# Patient Record
Sex: FEMALE | Race: WHITE | NOT HISPANIC OR LATINO | Employment: OTHER | URBAN - METROPOLITAN AREA
[De-identification: names, ages, dates, MRNs, and addresses within clinical notes are randomized per-mention and may not be internally consistent; named-entity substitution may affect disease eponyms.]

---

## 2018-10-18 ENCOUNTER — HOSPITAL ENCOUNTER (EMERGENCY)
Facility: HOSPITAL | Age: 23
Discharge: HOME | End: 2018-10-18
Attending: SURGERY
Payer: COMMERCIAL

## 2018-10-18 VITALS
RESPIRATION RATE: 18 BRPM | HEART RATE: 78 BPM | TEMPERATURE: 98 F | OXYGEN SATURATION: 98 % | DIASTOLIC BLOOD PRESSURE: 78 MMHG | WEIGHT: 250 LBS | SYSTOLIC BLOOD PRESSURE: 134 MMHG

## 2018-10-18 DIAGNOSIS — F41.9 ANXIETY: Primary | ICD-10-CM

## 2018-10-18 DIAGNOSIS — R05.9 COUGH: ICD-10-CM

## 2018-10-18 DIAGNOSIS — J40 BRONCHITIS: ICD-10-CM

## 2018-10-18 LAB
ALBUMIN SERPL BCP-MCNC: 3.8 G/DL
ALP SERPL-CCNC: 88 U/L
ALT SERPL W/O P-5'-P-CCNC: 14 U/L
AMPHET+METHAMPHET UR QL: NEGATIVE
ANION GAP SERPL CALC-SCNC: 10 MMOL/L
AST SERPL-CCNC: 18 U/L
B-HCG UR QL: NEGATIVE
BARBITURATES UR QL SCN>200 NG/ML: NEGATIVE
BASOPHILS # BLD AUTO: 0.05 K/UL
BASOPHILS NFR BLD: 0.5 %
BENZODIAZ UR QL SCN>200 NG/ML: NEGATIVE
BILIRUB SERPL-MCNC: 0.2 MG/DL
BILIRUB UR QL STRIP: NEGATIVE
BUN SERPL-MCNC: 11 MG/DL
BZE UR QL SCN: NEGATIVE
CALCIUM SERPL-MCNC: 9.2 MG/DL
CANNABINOIDS UR QL SCN: NEGATIVE
CHLORIDE SERPL-SCNC: 107 MMOL/L
CLARITY UR: CLEAR
CO2 SERPL-SCNC: 22 MMOL/L
COLOR UR: YELLOW
CREAT SERPL-MCNC: 0.8 MG/DL
CREAT UR-MCNC: 79.8 MG/DL
DEPRECATED S PYO AG THROAT QL EIA: NEGATIVE
DIFFERENTIAL METHOD: NORMAL
EOSINOPHIL # BLD AUTO: 0.3 K/UL
EOSINOPHIL NFR BLD: 2.5 %
ERYTHROCYTE [DISTWIDTH] IN BLOOD BY AUTOMATED COUNT: 14.1 %
EST. GFR  (AFRICAN AMERICAN): >60 ML/MIN/1.73 M^2
EST. GFR  (NON AFRICAN AMERICAN): >60 ML/MIN/1.73 M^2
GLUCOSE SERPL-MCNC: 100 MG/DL
GLUCOSE UR QL STRIP: NEGATIVE
HCT VFR BLD AUTO: 41.3 %
HETEROPH AB SERPL QL IA: NEGATIVE
HGB BLD-MCNC: 13.8 G/DL
HGB UR QL STRIP: ABNORMAL
INFLUENZA A, MOLECULAR: NEGATIVE
INFLUENZA B, MOLECULAR: NEGATIVE
KETONES UR QL STRIP: NEGATIVE
LEUKOCYTE ESTERASE UR QL STRIP: NEGATIVE
LYMPHOCYTES # BLD AUTO: 2.2 K/UL
LYMPHOCYTES NFR BLD: 21.1 %
MCH RBC QN AUTO: 28.2 PG
MCHC RBC AUTO-ENTMCNC: 33.4 G/DL
MCV RBC AUTO: 84 FL
METHADONE UR QL SCN>300 NG/ML: NEGATIVE
MONOCYTES # BLD AUTO: 0.9 K/UL
MONOCYTES NFR BLD: 8.5 %
NEUTROPHILS # BLD AUTO: 7.2 K/UL
NEUTROPHILS NFR BLD: 67.4 %
NITRITE UR QL STRIP: NEGATIVE
OPIATES UR QL SCN: NEGATIVE
PCP UR QL SCN>25 NG/ML: NEGATIVE
PH UR STRIP: 6 [PH] (ref 5–8)
PLATELET # BLD AUTO: 265 K/UL
PMV BLD AUTO: 11.6 FL
POTASSIUM SERPL-SCNC: 4.3 MMOL/L
PROT SERPL-MCNC: 7.4 G/DL
PROT UR QL STRIP: NEGATIVE
RBC # BLD AUTO: 4.9 M/UL
SODIUM SERPL-SCNC: 139 MMOL/L
SP GR UR STRIP: 1.02 (ref 1–1.03)
SPECIMEN SOURCE: NORMAL
TOXICOLOGY INFORMATION: NORMAL
URN SPEC COLLECT METH UR: ABNORMAL
UROBILINOGEN UR STRIP-ACNC: NEGATIVE EU/DL
WBC # BLD AUTO: 10.62 K/UL

## 2018-10-18 PROCEDURE — 87502 INFLUENZA DNA AMP PROBE: CPT

## 2018-10-18 PROCEDURE — 85025 COMPLETE CBC W/AUTO DIFF WBC: CPT

## 2018-10-18 PROCEDURE — 99900031 HC PATIENT EDUCATION (STAT)

## 2018-10-18 PROCEDURE — 80053 COMPREHEN METABOLIC PANEL: CPT

## 2018-10-18 PROCEDURE — 87880 STREP A ASSAY W/OPTIC: CPT

## 2018-10-18 PROCEDURE — 80307 DRUG TEST PRSMV CHEM ANLYZR: CPT

## 2018-10-18 PROCEDURE — 94640 AIRWAY INHALATION TREATMENT: CPT

## 2018-10-18 PROCEDURE — 87081 CULTURE SCREEN ONLY: CPT

## 2018-10-18 PROCEDURE — 36415 COLL VENOUS BLD VENIPUNCTURE: CPT

## 2018-10-18 PROCEDURE — 93005 ELECTROCARDIOGRAM TRACING: CPT

## 2018-10-18 PROCEDURE — 25000242 PHARM REV CODE 250 ALT 637 W/ HCPCS: Performed by: SURGERY

## 2018-10-18 PROCEDURE — 81003 URINALYSIS AUTO W/O SCOPE: CPT | Mod: 59

## 2018-10-18 PROCEDURE — 99284 EMERGENCY DEPT VISIT MOD MDM: CPT | Mod: 25

## 2018-10-18 PROCEDURE — 96372 THER/PROPH/DIAG INJ SC/IM: CPT

## 2018-10-18 PROCEDURE — 86308 HETEROPHILE ANTIBODY SCREEN: CPT

## 2018-10-18 PROCEDURE — 93010 ELECTROCARDIOGRAM REPORT: CPT | Mod: ,,, | Performed by: INTERNAL MEDICINE

## 2018-10-18 PROCEDURE — 63600175 PHARM REV CODE 636 W HCPCS: Performed by: SURGERY

## 2018-10-18 PROCEDURE — 81025 URINE PREGNANCY TEST: CPT

## 2018-10-18 RX ORDER — IPRATROPIUM BROMIDE AND ALBUTEROL SULFATE 2.5; .5 MG/3ML; MG/3ML
3 SOLUTION RESPIRATORY (INHALATION)
Status: COMPLETED | OUTPATIENT
Start: 2018-10-18 | End: 2018-10-18

## 2018-10-18 RX ORDER — PROMETHAZINE HYDROCHLORIDE AND DEXTROMETHORPHAN HYDROBROMIDE 6.25; 15 MG/5ML; MG/5ML
5 SYRUP ORAL EVERY 6 HOURS PRN
Qty: 118 ML | Refills: 0 | Status: SHIPPED | OUTPATIENT
Start: 2018-10-18 | End: 2018-10-28

## 2018-10-18 RX ORDER — LEVOFLOXACIN 500 MG/1
500 TABLET, FILM COATED ORAL DAILY
Qty: 7 TABLET | Refills: 0 | Status: SHIPPED | OUTPATIENT
Start: 2018-10-18 | End: 2018-10-25

## 2018-10-18 RX ORDER — METHYLPREDNISOLONE SOD SUCC 125 MG
125 VIAL (EA) INJECTION
Status: COMPLETED | OUTPATIENT
Start: 2018-10-18 | End: 2018-10-18

## 2018-10-18 RX ORDER — METHYLPREDNISOLONE 4 MG/1
TABLET ORAL
Qty: 1 PACKAGE | Refills: 0 | Status: SHIPPED | OUTPATIENT
Start: 2018-10-18 | End: 2019-07-03

## 2018-10-18 RX ADMIN — METHYLPREDNISOLONE SODIUM SUCCINATE 125 MG: 125 INJECTION, POWDER, FOR SOLUTION INTRAMUSCULAR; INTRAVENOUS at 05:10

## 2018-10-18 RX ADMIN — IPRATROPIUM BROMIDE AND ALBUTEROL SULFATE 3 ML: .5; 3 SOLUTION RESPIRATORY (INHALATION) at 06:10

## 2018-10-18 NOTE — ED TRIAGE NOTES
Stated she has had chest discomfort for about 4 months. Also states she has had a cough for about 1 week.  No n/v or diarrhea.  No weakness or sob.

## 2018-10-18 NOTE — ED PROVIDER NOTES
[facility]                                                 Chief Complaint  23 y.o. female with Cough (cough x 1 week)    History of Present Illness  Julieth Randhawa presents to the emergency room with cough and congestion  Patient has had a cough for a week, thinks that she has recurrent bronchitis  Patient on exam has clear lung sounds with minor nasal congestion noted  Pt has severe anxiety and states she has not been feeling well for 4 months  Patient has a normal sinus rhythm on EKG without ST changes noted tonight  Patient is a clear chest x-ray, afebrile with a 99% oxygen on ER triage today    The history is provided by the patient   device was not used during this ER visit  History reviewed. No pertinent past medical history.  History reviewed. No pertinent surgical history.   No Known Allergies   History reviewed. No pertinent family history.    Review of Systems and Physical Exam      Review of Systems  -- Constitution - no fever, denies fatigue, no weakness, no chills  -- Eyes - no tearing or redness, no visual disturbance  -- Ear, Nose - no tinnitus or earache, no nasal congestion or discharge  -- Mouth,Throat - no sore throat, no toothache, normal voice, normal swallowing  -- Respiratory - cough and congestion, no shortness of breath, no DENG  -- Cardiovascular - denies chest pain, no palpitations, denies claudication  -- Gastrointestinal - denies abdominal pain, nausea, vomiting, or diarrhea  -- Genitourinary - no dysuria, no hematuria, no flank pain, no bladder pain  -- Musculoskeletal - denies back pain, negative for myalgias and arthralgias   -- Neurological - no headache, denies weakness or seizure; no LOC  -- Skin - denies pallor, rash, or changes in skin. no hives or welts noted    Vital Signs  Her oral temperature is 97.8 °F (36.6 °C).   Her blood pressure is 124/70 and her pulse is 85.   Her respiration is 16 and oxygen saturation is 98%.     Physical  Exam  -- Nursing note and vitals reviewed  -- Constitutional: Appears well-developed and well-nourished  -- Head: Atraumatic. Normocephalic. No obvious abnormality  -- Eyes: Pupils are equal and reactive to light. Normal conjunctiva and lids  -- Nose: nasal mucosa erythema and edema; clear nasal discharge noted   -- Throat: Mucous membranes moist, pharynx normal, normal tonsils. No lesions   -- Ears: External ears and TM normal bilaterally. Normal hearing and no drainage  -- Neck: Normal range of motion. Neck supple. No masses, trachea midline  -- Cardiac: Normal rate, regular rhythm and normal heart sounds  -- Pulmonary: Normal respiratory effort, breath sounds clear to auscultation  -- Abdominal: Soft, no tenderness. Normal bowel sounds. Normal liver edge  -- Musculoskeletal: Normal range of motion, no effusions. Joints stable   -- Neurological: No focal deficits. Showed good interaction with staff  -- Skin: Warm and dry. No evidence of rash or cellulitis    Emergency Room Course      Lab Results     K 4.3      CO2 22 (L)   BUN 11   CREATININE 0.8      ALKPHOS 88   AST 18   ALT 14   BILITOT 0.2   ALBUMIN 3.8   PROT 7.4   WBC 10.62   HGB 13.8   HCT 41.3        Urinalysis  -- Urinalysis performed during this ER visit showed no signs of infection    -- The urine pregnancy test today was negative; patient informed of the results    EKG  -- The EKG findings today were without concerning findings from baseline    Radiology  -- Chest x-ray showed no infiltrate and showed no acute pathology    Medications Given  methylPREDNISolone sodium succinate injection 125 mg (125 mg Intramuscular Given 10/18/18 7787)   albuterol-ipratropium 2.5 mg-0.5 mg/3 mL nebulizer solution 3 mL (3 mLs Nebulization Given 10/18/18 4078)     Additional workup  -- The influenza screen was negative  -- The strep screen was negative  -- The mono screen was negative    Diagnosis  -- Anxiety  -- Bronchitis    Disposition and  Plan  -- Disposition: home  -- Condition: stable  -- Follow-up: Patient to follow up with MDin 1-2 days.  -- I advised the patient that we have found no life threatening condition today  -- At this time, I believe the patient is clinically stable for discharge.   -- The patient acknowledges that close follow up with a MD is required   -- Patient agrees to comply with all instruction and direction given in the ER    This note is dictated on Dragon Natural Speaking word recognition program.  There are word recognition mistakes that are occasionally missed on review.            Shawn Lerner MD  10/18/18 3743

## 2018-10-18 NOTE — ED NOTES
Dr. campos with pt and family. Pt continues to have some abd discomfort.  Dr. campos consulting family member in his office.

## 2018-10-21 LAB — BACTERIA THROAT CULT: NORMAL

## 2019-02-08 ENCOUNTER — TELEPHONE (OUTPATIENT)
Dept: OBSTETRICS AND GYNECOLOGY | Facility: CLINIC | Age: 24
End: 2019-02-08

## 2019-02-08 NOTE — TELEPHONE ENCOUNTER
----- Message from Melissa Rubio sent at 2/8/2019 10:55 AM CST -----  Contact: self  Julieth Randhawa  MRN: [id number]  Home Phone      [phone]  Work Phone      Not on file.  Mobile          Not on file.    Patient Care Team:  Primary Doctor No as PCP - General  OB? No  What phone number can you be reached at? [phone]  Message:  Pt states she would like to been seen by a female physician. Pt states endometriosis runs in her family and she's been having a lot of abdominal pain. Pt was offered soonest available with  but would like something sooner. Pt would be a new pt with [facility]. Please return call.

## 2019-04-01 ENCOUNTER — TELEPHONE (OUTPATIENT)
Dept: OBSTETRICS AND GYNECOLOGY | Facility: CLINIC | Age: 24
End: 2019-04-01

## 2019-04-01 NOTE — TELEPHONE ENCOUNTER
"----- Message from Denise Perez sent at 4/1/2019 10:57 AM CDT -----  Contact: Self  Julieth Randhawa  MRN: [id number]  Home Phone      [phone]  Work Phone      Not on file.  Mobile          Not on file.    Patient Care Team:  Primary Doctor No as PCP - General  OB? No  What phone number can you be reached at? [phone]  Message:   Pt has appt for tomorrow for "painful cycles" and would like to know what will take place at the appt. Please advise.   "

## 2019-04-01 NOTE — TELEPHONE ENCOUNTER
Spoke to pt. Inquiring what procedure will be done at upcoming appt. [facility] will speak to MD regarding issue and may have pelvic exam if deemed necessary. Verbalized understanding.

## 2019-07-03 ENCOUNTER — OFFICE VISIT (OUTPATIENT)
Dept: INTERNAL MEDICINE | Facility: CLINIC | Age: 24
End: 2019-07-03
Payer: COMMERCIAL

## 2019-07-03 VITALS
BODY MASS INDEX: 44.47 KG/M2 | HEIGHT: 63 IN | OXYGEN SATURATION: 98 % | RESPIRATION RATE: 18 BRPM | HEART RATE: 81 BPM | DIASTOLIC BLOOD PRESSURE: 84 MMHG | WEIGHT: 251 LBS | SYSTOLIC BLOOD PRESSURE: 128 MMHG

## 2019-07-03 DIAGNOSIS — E66.01 MORBID OBESITY WITH BMI OF 40.0-44.9, ADULT: ICD-10-CM

## 2019-07-03 DIAGNOSIS — R55 PRE-SYNCOPE: Primary | ICD-10-CM

## 2019-07-03 DIAGNOSIS — R42 DIZZINESS: ICD-10-CM

## 2019-07-03 PROCEDURE — 3008F PR BODY MASS INDEX (BMI) DOCUMENTED: ICD-10-PCS | Mod: CPTII,S$GLB,, | Performed by: INTERNAL MEDICINE

## 2019-07-03 PROCEDURE — 99999 PR PBB SHADOW E&M-EST. PATIENT-LVL III: CPT | Mod: PBBFAC,,, | Performed by: INTERNAL MEDICINE

## 2019-07-03 PROCEDURE — 99203 PR OFFICE/OUTPT VISIT, NEW, LEVL III, 30-44 MIN: ICD-10-PCS | Mod: S$GLB,,, | Performed by: INTERNAL MEDICINE

## 2019-07-03 PROCEDURE — 99203 OFFICE O/P NEW LOW 30 MIN: CPT | Mod: S$GLB,,, | Performed by: INTERNAL MEDICINE

## 2019-07-03 PROCEDURE — 99999 PR PBB SHADOW E&M-EST. PATIENT-LVL III: ICD-10-PCS | Mod: PBBFAC,,, | Performed by: INTERNAL MEDICINE

## 2019-07-03 PROCEDURE — 3008F BODY MASS INDEX DOCD: CPT | Mod: CPTII,S$GLB,, | Performed by: INTERNAL MEDICINE

## 2019-07-03 RX ORDER — MECLIZINE HYDROCHLORIDE 25 MG/1
25 TABLET ORAL 3 TIMES DAILY PRN
Qty: 30 TABLET | Refills: 0 | Status: SHIPPED | OUTPATIENT
Start: 2019-07-03 | End: 2020-05-25

## 2019-07-03 NOTE — PROGRESS NOTES
"Subjective:       Patient ID: Julieth Randhawa is a 24 y.o. female.    Chief Complaint: Dizziness (with lightheaded- x 6/21 - comes and goes but getting worse)      HPI:  Patient is new to clinic and presents to [facility] care and with lightheadedness. Sx started about 2 weeks ago. She fells if she "moves to quickly" she feels her symptoms come one. She thinks it is progressing and now just "coming anytime" Associated with nausea. She describes a spinning sensation and tunnel vision. She reports her sx occurring "20 times a day". Sx last for abo ut 10 seconds and then resolves. Nothing makes it worse. Nothing makes it better. No head trauma. No h/o diabetes, HTN. She does have PCOS and endometriosis. She is not on any prescription meds, herbals or supplements. She reports she is  to a female partner so she is not pregnant.    Tobacco use: denies  EtOH use: denies  Illicit drugs: denies    History reviewed. No pertinent past medical history.    Family History   Problem Relation Age of Onset    Hypertension Mother        Social History     Socioeconomic History    Marital status: Single     Spouse name: Not on file    Number of children: Not on file    Years of education: Not on file    Highest education level: Not on file   Occupational History    Not on file   Social Needs    Financial resource strain: Not on file    Food insecurity:     Worry: Not on file     Inability: Not on file    Transportation needs:     Medical: Not on file     Non-medical: Not on file   Tobacco Use    Smoking status: Never Smoker    Smokeless tobacco: Never Used   Substance and Sexual Activity    Alcohol use: Never     Frequency: Never    Drug use: Never    Sexual activity: Not on file   Lifestyle    Physical activity:     Days per week: Not on file     Minutes per session: Not on file    Stress: Not on file   Relationships    Social connections:     Talks on phone: Not on file     Gets together: Not on file     Attends " [employer] service: Not on file     Active member of club or organization: Not on file     Attends meetings of clubs or organizations: Not on file     Relationship status: Not on file   Other Topics Concern    Not on file   Social History Narrative    Not on file       Review of Systems   Constitutional: Negative for activity change, fatigue, fever and unexpected weight change.   HENT: Negative for congestion, ear pain, hearing loss, rhinorrhea and sore throat.    Eyes: Negative for redness and visual disturbance.   Respiratory: Negative for cough, shortness of breath and wheezing.    Cardiovascular: Negative for chest pain, palpitations and leg swelling.   Gastrointestinal: Negative for abdominal pain, constipation, diarrhea, nausea and vomiting.   Genitourinary: Negative for dysuria, frequency and urgency.   Musculoskeletal: Negative for back pain, joint swelling and neck pain.   Skin: Negative for color change, rash and wound.   Neurological: Positive for dizziness and light-headedness. Negative for tremors and headaches.         Objective:      Physical Exam   Constitutional: She is oriented to person, place, and time. She appears well-developed and well-nourished. No distress.   HENT:   Head: Normocephalic and atraumatic.   Right Ear: External ear normal.   Left Ear: External ear normal.   Eyes: Pupils are equal, round, and reactive to light. Conjunctivae and EOM are normal. Right eye exhibits no discharge. Left eye exhibits no discharge.   Neck: Neck supple. No tracheal deviation present.   Cardiovascular: Normal rate and regular rhythm.   No murmur heard.  Pulmonary/Chest: Effort normal and breath sounds normal. No respiratory distress. She has no wheezes. She has no rales.   Abdominal: Soft. Bowel sounds are normal. She exhibits no distension. There is no tenderness.   Musculoskeletal: She exhibits no edema.   Neurological: She is alert and oriented to person, place, and time. No cranial nerve deficit.    Skin: Skin is warm and dry.   Psychiatric: She has a normal mood and affect. Her behavior is normal.   Vitals reviewed.      Assessment:       1. Pre-syncope    2. Dizziness    3. Morbid obesity with BMI of 40.0-44.9, adult        Plan:       Julieth was seen today for dizziness.    Diagnoses and all orders for this visit:    Pre-syncope  -     CBC auto differential; Future  -     Comprehensive metabolic panel; Future  -     TSH; Future  -     Lipid panel; Future  -     T4, free; Future  -     Urinalysis; Future  -     Drug screen panel, emergency; Future  -     EKG 12-lead; Future    Dizziness  -     CBC auto differential; Future  -     Comprehensive metabolic panel; Future  -     TSH; Future  -     Lipid panel; Future  -     T4, free; Future  -     Urinalysis; Future  -     Drug screen panel, emergency; Future  -     EKG 12-lead; Future  -     meclizine (ANTIVERT) 25 mg tablet; Take 1 tablet (25 mg total) by mouth 3 (three) times daily as needed.    Morbid obesity with BMI of 40.0-44.9, adult  -     CBC auto differential; Future  -     Comprehensive metabolic panel; Future  -     TSH; Future  -     Lipid panel; Future  -     T4, free; Future      Sx sound more like pre-syncope with tunnel vision and lightheadedness  Ddx broad  Check routine labs and EKG first  Can trial meclizine for dizziness, may be a vertigo component to this as well  Stay hydrated  Eat 3 good meals or 6 small meals a day. Keep healthy snacks available.  Will call with results of above work up and go from there

## 2019-07-05 ENCOUNTER — LAB VISIT (OUTPATIENT)
Dept: LAB | Facility: HOSPITAL | Age: 24
End: 2019-07-05
Attending: INTERNAL MEDICINE
Payer: COMMERCIAL

## 2019-07-05 DIAGNOSIS — E66.01 MORBID OBESITY WITH BMI OF 40.0-44.9, ADULT: ICD-10-CM

## 2019-07-05 DIAGNOSIS — R42 DIZZINESS: ICD-10-CM

## 2019-07-05 DIAGNOSIS — R55 PRE-SYNCOPE: ICD-10-CM

## 2019-07-05 LAB
ALBUMIN SERPL BCP-MCNC: 3.7 G/DL (ref 3.5–5.2)
ALP SERPL-CCNC: 89 U/L (ref 55–135)
ALT SERPL W/O P-5'-P-CCNC: 24 U/L (ref 10–44)
AMPHET+METHAMPHET UR QL: NEGATIVE
ANION GAP SERPL CALC-SCNC: 10 MMOL/L (ref 8–16)
AST SERPL-CCNC: 21 U/L (ref 10–40)
BARBITURATES UR QL SCN>200 NG/ML: NEGATIVE
BASOPHILS # BLD AUTO: 0.06 K/UL (ref 0–0.2)
BASOPHILS NFR BLD: 0.8 % (ref 0–1.9)
BENZODIAZ UR QL SCN>200 NG/ML: NEGATIVE
BILIRUB SERPL-MCNC: 0.5 MG/DL (ref 0.1–1)
BILIRUB UR QL STRIP: NEGATIVE
BUN SERPL-MCNC: 10 MG/DL (ref 6–20)
BZE UR QL SCN: NEGATIVE
CALCIUM SERPL-MCNC: 9.4 MG/DL (ref 8.7–10.5)
CANNABINOIDS UR QL SCN: NEGATIVE
CHLORIDE SERPL-SCNC: 107 MMOL/L (ref 95–110)
CHOLEST SERPL-MCNC: 155 MG/DL (ref 120–199)
CHOLEST/HDLC SERPL: 4.4 {RATIO} (ref 2–5)
CLARITY UR: CLEAR
CO2 SERPL-SCNC: 23 MMOL/L (ref 23–29)
COLOR UR: YELLOW
CREAT SERPL-MCNC: 0.8 MG/DL (ref 0.5–1.4)
CREAT UR-MCNC: 94.4 MG/DL (ref 15–325)
DIFFERENTIAL METHOD: NORMAL
EOSINOPHIL # BLD AUTO: 0.2 K/UL (ref 0–0.5)
EOSINOPHIL NFR BLD: 2.5 % (ref 0–8)
ERYTHROCYTE [DISTWIDTH] IN BLOOD BY AUTOMATED COUNT: 13.2 % (ref 11.5–14.5)
EST. GFR  (AFRICAN AMERICAN): >60 ML/MIN/1.73 M^2
EST. GFR  (NON AFRICAN AMERICAN): >60 ML/MIN/1.73 M^2
GLUCOSE SERPL-MCNC: 92 MG/DL (ref 70–110)
GLUCOSE UR QL STRIP: NEGATIVE
HCT VFR BLD AUTO: 41.1 % (ref 37–48.5)
HDLC SERPL-MCNC: 35 MG/DL (ref 40–75)
HDLC SERPL: 22.6 % (ref 20–50)
HGB BLD-MCNC: 13.3 G/DL (ref 12–16)
HGB UR QL STRIP: ABNORMAL
IMM GRANULOCYTES # BLD AUTO: 0.02 K/UL (ref 0–0.04)
IMM GRANULOCYTES NFR BLD AUTO: 0.3 % (ref 0–0.5)
KETONES UR QL STRIP: NEGATIVE
LDLC SERPL CALC-MCNC: 90.6 MG/DL (ref 63–159)
LEUKOCYTE ESTERASE UR QL STRIP: NEGATIVE
LYMPHOCYTES # BLD AUTO: 2.4 K/UL (ref 1–4.8)
LYMPHOCYTES NFR BLD: 31.6 % (ref 18–48)
MCH RBC QN AUTO: 27.6 PG (ref 27–31)
MCHC RBC AUTO-ENTMCNC: 32.4 G/DL (ref 32–36)
MCV RBC AUTO: 85 FL (ref 82–98)
METHADONE UR QL SCN>300 NG/ML: NEGATIVE
MONOCYTES # BLD AUTO: 0.6 K/UL (ref 0.3–1)
MONOCYTES NFR BLD: 8.2 % (ref 4–15)
NEUTROPHILS # BLD AUTO: 4.2 K/UL (ref 1.8–7.7)
NEUTROPHILS NFR BLD: 56.6 % (ref 38–73)
NITRITE UR QL STRIP: NEGATIVE
NONHDLC SERPL-MCNC: 120 MG/DL
NRBC BLD-RTO: 0 /100 WBC
OPIATES UR QL SCN: NEGATIVE
PCP UR QL SCN>25 NG/ML: NEGATIVE
PH UR STRIP: 5 [PH] (ref 5–8)
PLATELET # BLD AUTO: 309 K/UL (ref 150–350)
PMV BLD AUTO: 11.1 FL (ref 9.2–12.9)
POTASSIUM SERPL-SCNC: 4 MMOL/L (ref 3.5–5.1)
PROT SERPL-MCNC: 6.9 G/DL (ref 6–8.4)
PROT UR QL STRIP: NEGATIVE
RBC # BLD AUTO: 4.82 M/UL (ref 4–5.4)
SODIUM SERPL-SCNC: 140 MMOL/L (ref 136–145)
SP GR UR STRIP: 1.02 (ref 1–1.03)
T4 FREE SERPL-MCNC: 0.8 NG/DL (ref 0.71–1.51)
TOXICOLOGY INFORMATION: NORMAL
TRIGL SERPL-MCNC: 147 MG/DL (ref 30–150)
TSH SERPL DL<=0.005 MIU/L-ACNC: 1.77 UIU/ML (ref 0.4–4)
URN SPEC COLLECT METH UR: ABNORMAL
UROBILINOGEN UR STRIP-ACNC: NEGATIVE EU/DL
WBC # BLD AUTO: 7.48 K/UL (ref 3.9–12.7)

## 2019-07-05 PROCEDURE — 81003 URINALYSIS AUTO W/O SCOPE: CPT | Mod: 59

## 2019-07-05 PROCEDURE — 80307 DRUG TEST PRSMV CHEM ANLYZR: CPT

## 2019-07-05 PROCEDURE — 84443 ASSAY THYROID STIM HORMONE: CPT

## 2019-07-05 PROCEDURE — 80061 LIPID PANEL: CPT

## 2019-07-05 PROCEDURE — 80053 COMPREHEN METABOLIC PANEL: CPT

## 2019-07-05 PROCEDURE — 85025 COMPLETE CBC W/AUTO DIFF WBC: CPT

## 2019-07-05 PROCEDURE — 84439 ASSAY OF FREE THYROXINE: CPT

## 2019-07-05 PROCEDURE — 36415 COLL VENOUS BLD VENIPUNCTURE: CPT

## 2019-07-10 ENCOUNTER — OFFICE VISIT (OUTPATIENT)
Dept: INTERNAL MEDICINE | Facility: CLINIC | Age: 24
End: 2019-07-10
Payer: COMMERCIAL

## 2019-07-10 VITALS
HEIGHT: 63 IN | BODY MASS INDEX: 44.47 KG/M2 | WEIGHT: 251 LBS | HEART RATE: 77 BPM | SYSTOLIC BLOOD PRESSURE: 124 MMHG | RESPIRATION RATE: 18 BRPM | DIASTOLIC BLOOD PRESSURE: 86 MMHG | OXYGEN SATURATION: 98 %

## 2019-07-10 DIAGNOSIS — R55 SYNCOPE, UNSPECIFIED SYNCOPE TYPE: Primary | ICD-10-CM

## 2019-07-10 PROCEDURE — 99213 OFFICE O/P EST LOW 20 MIN: CPT | Mod: S$GLB,,, | Performed by: INTERNAL MEDICINE

## 2019-07-10 PROCEDURE — 3008F BODY MASS INDEX DOCD: CPT | Mod: CPTII,S$GLB,, | Performed by: INTERNAL MEDICINE

## 2019-07-10 PROCEDURE — 99999 PR PBB SHADOW E&M-EST. PATIENT-LVL III: CPT | Mod: PBBFAC,,, | Performed by: INTERNAL MEDICINE

## 2019-07-10 PROCEDURE — 3008F PR BODY MASS INDEX (BMI) DOCUMENTED: ICD-10-PCS | Mod: CPTII,S$GLB,, | Performed by: INTERNAL MEDICINE

## 2019-07-10 PROCEDURE — 99213 PR OFFICE/OUTPT VISIT, EST, LEVL III, 20-29 MIN: ICD-10-PCS | Mod: S$GLB,,, | Performed by: INTERNAL MEDICINE

## 2019-07-10 PROCEDURE — 99999 PR PBB SHADOW E&M-EST. PATIENT-LVL III: ICD-10-PCS | Mod: PBBFAC,,, | Performed by: INTERNAL MEDICINE

## 2019-07-10 RX ORDER — FLUOXETINE HYDROCHLORIDE 40 MG/1
40 CAPSULE ORAL DAILY
Refills: 2 | COMMUNITY
Start: 2019-07-05 | End: 2020-05-25

## 2019-07-10 RX ORDER — TRAZODONE HYDROCHLORIDE 50 MG/1
TABLET ORAL
Refills: 2 | COMMUNITY
Start: 2019-07-05 | End: 2020-05-25

## 2019-07-10 NOTE — PROGRESS NOTES
"Subjective:       Patient ID: Julieth Randhawa is a 24 y.o. female.    Chief Complaint: Medication Management (meds not helping with dizziness-constant)      HPI:  Patient is known to me and presents for follow up dizziness. We tried meclizine and OTc dramamine without relief of sx. She had one syncopal episode since I saw her last. When she came to she vomited. Her sx are worse in the afternoon. No abnormal shaking. Labs from 7/5/19 personally reviewed and discussed with the patient today. No abnormalities.    From last visit: "Sx started about 2 weeks ago. She fells if she "moves to quickly" she feels her symptoms come one. She thinks it is progressing and now just "coming anytime" Associated with nausea. She describes a spinning sensation and tunnel vision. She reports her sx occurring "20 times a day". Sx last for abo ut 10 seconds and then resolves. Nothing makes it worse. Nothing makes it better. No head trauma. No h/o diabetes, HTN. She does have PCOS and endometriosis. She is not on any prescription meds, herbals or supplements. She reports she is  to a female partner so she is not pregnant.     Tobacco use: denies  EtOH use: denies  Illicit drugs: denies"      History reviewed. No pertinent past medical history.    Family History   Problem Relation Age of Onset    Hypertension Mother        Social History     Socioeconomic History    Marital status: Single     Spouse name: Not on file    Number of children: Not on file    Years of education: Not on file    Highest education level: Not on file   Occupational History    Not on file   Social Needs    Financial resource strain: Not on file    Food insecurity:     Worry: Not on file     Inability: Not on file    Transportation needs:     Medical: Not on file     Non-medical: Not on file   Tobacco Use    Smoking status: Never Smoker    Smokeless tobacco: Never Used   Substance and Sexual Activity    Alcohol use: Never     Frequency: Never    Drug " use: Never    Sexual activity: Not on file   Lifestyle    Physical activity:     Days per week: Not on file     Minutes per session: Not on file    Stress: Not on file   Relationships    Social connections:     Talks on phone: Not on file     Gets together: Not on file     Attends Mandaen service: Not on file     Active member of club or organization: Not on file     Attends meetings of clubs or organizations: Not on file     Relationship status: Not on file   Other Topics Concern    Not on file   Social History Narrative    Not on file       Review of Systems   Constitutional: Negative for activity change, fatigue, fever and unexpected weight change.   HENT: Negative for congestion, ear pain, hearing loss, rhinorrhea and sore throat.    Eyes: Negative for pain, redness and visual disturbance.   Respiratory: Negative for cough, shortness of breath and wheezing.    Cardiovascular: Negative for chest pain, palpitations and leg swelling.   Gastrointestinal: Positive for nausea. Negative for abdominal pain, blood in stool, constipation, diarrhea and vomiting.   Genitourinary: Negative for dysuria, frequency, pelvic pain and urgency.   Musculoskeletal: Negative for back pain, joint swelling and neck pain.   Skin: Negative for color change, rash and wound.   Neurological: Positive for dizziness, syncope and light-headedness. Negative for tremors, weakness and headaches.         Objective:      Physical Exam   Constitutional: She is oriented to person, place, and time. She appears well-developed and well-nourished. No distress.   Obese     HENT:   Head: Normocephalic and atraumatic.   Right Ear: External ear normal.   Left Ear: External ear normal.   Eyes: Pupils are equal, round, and reactive to light. Conjunctivae and EOM are normal. Right eye exhibits no discharge. Left eye exhibits no discharge.   Neck: Neck supple. No tracheal deviation present.   Cardiovascular: Normal rate and regular rhythm.   No murmur  heard.  Pulmonary/Chest: Effort normal and breath sounds normal. No respiratory distress. She has no wheezes. She has no rales.   Abdominal: Soft. Bowel sounds are normal. She exhibits no distension. There is no tenderness.   Neurological: She is alert and oriented to person, place, and time. No cranial nerve deficit.   Skin: Skin is warm and dry.   Psychiatric: She has a normal mood and affect. Her behavior is normal.   Vitals reviewed.      Assessment:       1. Syncope, unspecified syncope type        Plan:       Julieth was seen today for medication management.    Diagnoses and all orders for this visit:    Syncope, unspecified syncope type  -     Ambulatory referral to Cardiology    metabolic work up negative thus far  Physical exam is benign again today  Needs cardiac work up: I anticipate repeat EKG, holter, TTE, possible stress testing though low risk for CAD  Stay hydrated  Slow movements when sitting to standing, etc  No driving  6 small meals a day and keep snacks    RTC as scheduled and PRN. Follow up cardiac work up, will see Dr. beck

## 2019-10-11 ENCOUNTER — HOSPITAL ENCOUNTER (EMERGENCY)
Facility: HOSPITAL | Age: 24
Discharge: HOME | End: 2019-10-11
Attending: INTERNAL MEDICINE
Payer: COMMERCIAL

## 2019-10-11 VITALS
BODY MASS INDEX: 43.35 KG/M2 | RESPIRATION RATE: 18 BRPM | HEART RATE: 77 BPM | TEMPERATURE: 99 F | OXYGEN SATURATION: 97 % | SYSTOLIC BLOOD PRESSURE: 112 MMHG | WEIGHT: 244.69 LBS | DIASTOLIC BLOOD PRESSURE: 61 MMHG

## 2019-10-11 DIAGNOSIS — K52.9 GASTROENTERITIS: Primary | ICD-10-CM

## 2019-10-11 LAB
ALBUMIN SERPL BCP-MCNC: 3.6 G/DL (ref 3.5–5.2)
ALP SERPL-CCNC: 94 U/L (ref 55–135)
ALT SERPL W/O P-5'-P-CCNC: 23 U/L (ref 10–44)
AMPHET+METHAMPHET UR QL: NEGATIVE
ANION GAP SERPL CALC-SCNC: 12 MMOL/L (ref 8–16)
AST SERPL-CCNC: 23 U/L (ref 10–40)
B-HCG UR QL: NEGATIVE
BARBITURATES UR QL SCN>200 NG/ML: NEGATIVE
BASOPHILS # BLD AUTO: 0.03 K/UL (ref 0–0.2)
BASOPHILS NFR BLD: 0.3 % (ref 0–1.9)
BENZODIAZ UR QL SCN>200 NG/ML: NEGATIVE
BILIRUB SERPL-MCNC: 0.3 MG/DL (ref 0.1–1)
BILIRUB UR QL STRIP: NEGATIVE
BUN SERPL-MCNC: 11 MG/DL (ref 6–20)
BZE UR QL SCN: NEGATIVE
CALCIUM SERPL-MCNC: 8.9 MG/DL (ref 8.7–10.5)
CANNABINOIDS UR QL SCN: NEGATIVE
CHLORIDE SERPL-SCNC: 108 MMOL/L (ref 95–110)
CLARITY UR: CLEAR
CO2 SERPL-SCNC: 18 MMOL/L (ref 23–29)
COLOR UR: YELLOW
CREAT SERPL-MCNC: 0.9 MG/DL (ref 0.5–1.4)
CREAT UR-MCNC: 272 MG/DL (ref 15–325)
DIFFERENTIAL METHOD: ABNORMAL
EOSINOPHIL # BLD AUTO: 0.1 K/UL (ref 0–0.5)
EOSINOPHIL NFR BLD: 0.9 % (ref 0–8)
ERYTHROCYTE [DISTWIDTH] IN BLOOD BY AUTOMATED COUNT: 13.2 % (ref 11.5–14.5)
EST. GFR  (AFRICAN AMERICAN): >60 ML/MIN/1.73 M^2
EST. GFR  (NON AFRICAN AMERICAN): >60 ML/MIN/1.73 M^2
GLUCOSE SERPL-MCNC: 112 MG/DL (ref 70–110)
GLUCOSE UR QL STRIP: NEGATIVE
HCT VFR BLD AUTO: 42.4 % (ref 37–48.5)
HGB BLD-MCNC: 13.6 G/DL (ref 12–16)
HGB UR QL STRIP: ABNORMAL
IMM GRANULOCYTES # BLD AUTO: 0.02 K/UL (ref 0–0.04)
IMM GRANULOCYTES NFR BLD AUTO: 0.2 % (ref 0–0.5)
INFLUENZA A, MOLECULAR: NEGATIVE
INFLUENZA B, MOLECULAR: NEGATIVE
KETONES UR QL STRIP: NEGATIVE
LEUKOCYTE ESTERASE UR QL STRIP: NEGATIVE
LIPASE SERPL-CCNC: 13 U/L (ref 4–60)
LYMPHOCYTES # BLD AUTO: 1.2 K/UL (ref 1–4.8)
LYMPHOCYTES NFR BLD: 12.6 % (ref 18–48)
MCH RBC QN AUTO: 27.5 PG (ref 27–31)
MCHC RBC AUTO-ENTMCNC: 32.1 G/DL (ref 32–36)
MCV RBC AUTO: 86 FL (ref 82–98)
METHADONE UR QL SCN>300 NG/ML: NEGATIVE
MICROSCOPIC COMMENT: NORMAL
MONOCYTES # BLD AUTO: 0.7 K/UL (ref 0.3–1)
MONOCYTES NFR BLD: 7.7 % (ref 4–15)
NEUTROPHILS # BLD AUTO: 7.4 K/UL (ref 1.8–7.7)
NEUTROPHILS NFR BLD: 78.3 % (ref 38–73)
NITRITE UR QL STRIP: NEGATIVE
NRBC BLD-RTO: 0 /100 WBC
OPIATES UR QL SCN: NEGATIVE
PCP UR QL SCN>25 NG/ML: NEGATIVE
PH UR STRIP: 6 [PH] (ref 5–8)
PLATELET # BLD AUTO: 274 K/UL (ref 150–350)
PMV BLD AUTO: 11 FL (ref 9.2–12.9)
POTASSIUM SERPL-SCNC: 3.7 MMOL/L (ref 3.5–5.1)
PROT SERPL-MCNC: 7 G/DL (ref 6–8.4)
PROT UR QL STRIP: NEGATIVE
RBC # BLD AUTO: 4.94 M/UL (ref 4–5.4)
RBC #/AREA URNS HPF: 2 /HPF (ref 0–4)
SODIUM SERPL-SCNC: 138 MMOL/L (ref 136–145)
SP GR UR STRIP: 1.02 (ref 1–1.03)
SPECIMEN SOURCE: NORMAL
TOXICOLOGY INFORMATION: NORMAL
URN SPEC COLLECT METH UR: ABNORMAL
UROBILINOGEN UR STRIP-ACNC: NEGATIVE EU/DL
WBC # BLD AUTO: 9.4 K/UL (ref 3.9–12.7)
WBC #/AREA URNS HPF: 2 /HPF (ref 0–5)

## 2019-10-11 PROCEDURE — 63600175 PHARM REV CODE 636 W HCPCS: Performed by: INTERNAL MEDICINE

## 2019-10-11 PROCEDURE — 87502 INFLUENZA DNA AMP PROBE: CPT

## 2019-10-11 PROCEDURE — 63600175 PHARM REV CODE 636 W HCPCS: Performed by: SURGERY

## 2019-10-11 PROCEDURE — 99284 EMERGENCY DEPT VISIT MOD MDM: CPT | Mod: 25

## 2019-10-11 PROCEDURE — 96360 HYDRATION IV INFUSION INIT: CPT

## 2019-10-11 PROCEDURE — 81000 URINALYSIS NONAUTO W/SCOPE: CPT | Mod: 59

## 2019-10-11 PROCEDURE — 25000003 PHARM REV CODE 250: Performed by: SURGERY

## 2019-10-11 PROCEDURE — 81025 URINE PREGNANCY TEST: CPT

## 2019-10-11 PROCEDURE — 80307 DRUG TEST PRSMV CHEM ANLYZR: CPT

## 2019-10-11 PROCEDURE — 83690 ASSAY OF LIPASE: CPT

## 2019-10-11 PROCEDURE — 36415 COLL VENOUS BLD VENIPUNCTURE: CPT

## 2019-10-11 PROCEDURE — 96361 HYDRATE IV INFUSION ADD-ON: CPT

## 2019-10-11 PROCEDURE — 80053 COMPREHEN METABOLIC PANEL: CPT

## 2019-10-11 PROCEDURE — 85025 COMPLETE CBC W/AUTO DIFF WBC: CPT

## 2019-10-11 RX ORDER — PROMETHAZINE HYDROCHLORIDE 25 MG/1
25 TABLET ORAL EVERY 6 HOURS PRN
Qty: 15 TABLET | Refills: 0 | Status: SHIPPED | OUTPATIENT
Start: 2019-10-11 | End: 2020-05-25

## 2019-10-11 RX ORDER — METRONIDAZOLE 500 MG/1
500 TABLET ORAL
Status: COMPLETED | OUTPATIENT
Start: 2019-10-11 | End: 2019-10-11

## 2019-10-11 RX ORDER — DIPHENOXYLATE HYDROCHLORIDE AND ATROPINE SULFATE 2.5; .025 MG/1; MG/1
2 TABLET ORAL
Status: COMPLETED | OUTPATIENT
Start: 2019-10-11 | End: 2019-10-11

## 2019-10-11 RX ORDER — DICYCLOMINE HYDROCHLORIDE 20 MG/1
20 TABLET ORAL 4 TIMES DAILY PRN
Qty: 15 TABLET | Refills: 0 | Status: SHIPPED | OUTPATIENT
Start: 2019-10-11 | End: 2019-11-10

## 2019-10-11 RX ORDER — CIPROFLOXACIN 500 MG/1
500 TABLET ORAL
Status: COMPLETED | OUTPATIENT
Start: 2019-10-11 | End: 2019-10-11

## 2019-10-11 RX ORDER — SODIUM CHLORIDE 9 MG/ML
1000 INJECTION, SOLUTION INTRAVENOUS
Status: COMPLETED | OUTPATIENT
Start: 2019-10-11 | End: 2019-10-11

## 2019-10-11 RX ORDER — CIPROFLOXACIN 500 MG/1
500 TABLET ORAL 2 TIMES DAILY
Qty: 14 TABLET | Refills: 0 | Status: SHIPPED | OUTPATIENT
Start: 2019-10-11 | End: 2019-10-18

## 2019-10-11 RX ORDER — METRONIDAZOLE 500 MG/1
500 TABLET ORAL 4 TIMES DAILY
Qty: 28 TABLET | Refills: 0 | Status: SHIPPED | OUTPATIENT
Start: 2019-10-11 | End: 2019-10-18

## 2019-10-11 RX ORDER — LOPERAMIDE HYDROCHLORIDE 2 MG/1
2 CAPSULE ORAL 4 TIMES DAILY PRN
Qty: 12 CAPSULE | Refills: 0 | Status: SHIPPED | OUTPATIENT
Start: 2019-10-11 | End: 2019-10-21

## 2019-10-11 RX ADMIN — CIPROFLOXACIN 500 MG: 500 TABLET, FILM COATED ORAL at 09:10

## 2019-10-11 RX ADMIN — SODIUM CHLORIDE 1000 ML: 0.9 INJECTION, SOLUTION INTRAVENOUS at 07:10

## 2019-10-11 RX ADMIN — DIPHENOXYLATE HYDROCHLORIDE AND ATROPINE SULFATE 2 TABLET: 2.5; .025 TABLET ORAL at 09:10

## 2019-10-11 RX ADMIN — METRONIDAZOLE 500 MG: 500 TABLET, FILM COATED ORAL at 09:10

## 2019-10-11 RX ADMIN — SODIUM CHLORIDE 1000 ML: 0.9 INJECTION, SOLUTION INTRAVENOUS at 08:10

## 2019-10-11 NOTE — ED PROVIDER NOTES
[facility]                                                 Chief Complaint  24 y.o. female with Diarrhea; Vomiting; and Fever    History of Present Illness  Julieth Randhawa presents to the emergency room with diarrhea this week  Patient with nausea vomiting and diarrhea abdominal cramping today  Patient states she has had several watery diarrheal bowel movements  Patient states her bottom area is raw from the amount of diarrhea PTA  Patient has a soft abdomen with no signs of peritonitis or rebound noted  Patient has no recent travel outside the country, no risk for C diff noted    The history is provided by the patient   device was not used during this ER visit  History reviewed. No pertinent past medical history.   Surgeries: Tonsillectomy   No Known Allergies     I have reviewed all of this patient's past medical, surgical, family, and social   histories as well as active allergies and medications documented in the  electronic medical record    Review of Systems and Physical Exam      Review of Systems  -- Constitution - fever, denies fatigue, no weakness, no chills  -- Eyes - no tearing or redness, no visual disturbance  -- Ear, Nose - no tinnitus or earache, no nasal congestion or discharge  -- Mouth,Throat - no sore throat, no toothache, normal voice, normal swallowing  -- Respiratory - denies cough and congestion, no shortness of breath, no DENG  -- Cardiovascular - denies chest pain, no palpitations, denies claudication  -- Gastrointestinal - abdominal cramps with nausea, vomiting, & diarrhea   -- Genitourinary - no dysuria, denies flank pain, no hematuria, no STD risk  -- Musculoskeletal - denies back pain, negative for trauma or injury  -- Neurological - no headache, denies weakness or seizure; no LOC  -- Skin - denies pallor, rash, or changes in skin. no hives or welts noted  -- Psychiatric - Denies SI or HI, no psychosis or fractured thought noted     Vital Signs  Her  oral temperature is 99.2 °F (37.3 °C).   Her blood pressure is 126/78 and her pulse is 103.   Her respiration is 20 and oxygen saturation is 97%.     Physical Exam  -- Nursing note and vitals reviewed  -- Constitutional: Appears well-developed and well-nourished  -- Head: Atraumatic. Normocephalic. No obvious abnormality  -- Eyes: Pupils are equal and reactive to light. Normal conjunctiva and lids  -- Nose: Nose normal in appearance, nares grossly normal. No discharge  -- Throat: Mucous membranes moist, pharynx normal, normal tonsils. No lesions   -- Ears: External ears and TM normal bilaterally. Normal hearing and no drainage  -- Neck: Normal range of motion. Neck supple. No masses, trachea midline  -- Cardiac: Normal rate, regular rhythm and normal heart sounds  -- Pulmonary: Normal respiratory effort, breath sounds clear to auscultation  -- Abdominal: Soft, no tenderness. Normal bowel sounds. Normal liver edge  -- Musculoskeletal: Normal range of motion, no effusions. Joints stable   -- Neurological: No focal deficits. Showed good interaction with staff  -- Vascular: Posterior tibial, dorsalis pedis and radial pulses 2+ bilaterally    -- Lymphatics: No cervical or peripheral lymphadenopathy. No edema noted  -- Skin: Warm and dry. No evidence of rash or cellulitis  -- Psychiatric: Normal mood and affect. Bedside behavior is appropriate    Emergency Room Course      Lab Results     K 3.7      CO2 18 (L)   BUN 11   CREATININE 0.9    (H)   ALKPHOS 94   AST 23   ALT 23   BILITOT 0.3   ALBUMIN 3.6   PROT 7.0   WBC 9.40   HGB 13.6   HCT 42.4        Urinalysis  -- Urinalysis performed during this ER visit showed no signs of infection  -- The urine pregnancy test today was negative; patient informed of the results     Radiology  -- Flat and erect abdominal x-ray performed in the ER today was within normal limits     Medications Given  diphenoxylate-atropine 2.5-0.025 mg per tablet 2 tablet (has  no administration in time range)   ciprofloxacin HCl tablet 500 mg (has no administration in time range)   metroNIDAZOLE tablet 500 mg (has no administration in time range)   sodium chloride 0.9% bolus 1,000 mL (0 mLs Intravenous Stopped 10/11/19 0829)   0.9%  NaCl infusion (1,000 mLs Intravenous New Bag 10/11/19 0846)     Diagnosis  -- The encounter diagnosis was Gastroenteritis.    Disposition and Plan  -- Disposition: home  -- Condition: stable  -- Follow-up: Patient to follow up with Hazel Rome MD in 1-2 days.  -- I advised the patient that we have found no life threatening condition today  -- At this time, I believe the patient is clinically stable for discharge.   -- The patient acknowledges that close follow up with a MD is required   -- Patient agrees to comply with all instruction and direction given in the ER    This note is dictated on M*Modal word recognition program.  There are word recognition mistakes that are occasionally missed on review.         Shawn Lerner MD  10/11/19 8615

## 2019-10-11 NOTE — ED TRIAGE NOTES
Arrives per self transport from home. Presents with complaints of abdominal pain with vomiting and diarrhea x 2 days.

## 2020-05-25 ENCOUNTER — OFFICE VISIT (OUTPATIENT)
Dept: INTERNAL MEDICINE | Facility: CLINIC | Age: 25
End: 2020-05-25
Payer: COMMERCIAL

## 2020-05-25 VITALS
DIASTOLIC BLOOD PRESSURE: 78 MMHG | HEART RATE: 82 BPM | BODY MASS INDEX: 46.57 KG/M2 | SYSTOLIC BLOOD PRESSURE: 114 MMHG | WEIGHT: 262.81 LBS | HEIGHT: 63 IN | RESPIRATION RATE: 17 BRPM | OXYGEN SATURATION: 98 % | TEMPERATURE: 98 F

## 2020-05-25 DIAGNOSIS — E66.01 MORBID OBESITY WITH BMI OF 45.0-49.9, ADULT: ICD-10-CM

## 2020-05-25 DIAGNOSIS — F41.1 GAD (GENERALIZED ANXIETY DISORDER): Primary | ICD-10-CM

## 2020-05-25 DIAGNOSIS — F41.0 PANIC ATTACK: ICD-10-CM

## 2020-05-25 PROCEDURE — 99214 OFFICE O/P EST MOD 30 MIN: CPT | Mod: S$GLB,,, | Performed by: INTERNAL MEDICINE

## 2020-05-25 PROCEDURE — 3008F PR BODY MASS INDEX (BMI) DOCUMENTED: ICD-10-PCS | Mod: CPTII,S$GLB,, | Performed by: INTERNAL MEDICINE

## 2020-05-25 PROCEDURE — 99999 PR PBB SHADOW E&M-EST. PATIENT-LVL III: ICD-10-PCS | Mod: PBBFAC,,, | Performed by: INTERNAL MEDICINE

## 2020-05-25 PROCEDURE — 99999 PR PBB SHADOW E&M-EST. PATIENT-LVL III: CPT | Mod: PBBFAC,,, | Performed by: INTERNAL MEDICINE

## 2020-05-25 PROCEDURE — 99214 PR OFFICE/OUTPT VISIT, EST, LEVL IV, 30-39 MIN: ICD-10-PCS | Mod: S$GLB,,, | Performed by: INTERNAL MEDICINE

## 2020-05-25 PROCEDURE — 3008F BODY MASS INDEX DOCD: CPT | Mod: CPTII,S$GLB,, | Performed by: INTERNAL MEDICINE

## 2020-05-25 RX ORDER — ESCITALOPRAM OXALATE 5 MG/1
5 TABLET ORAL DAILY
Qty: 30 TABLET | Refills: 11 | Status: SHIPPED | OUTPATIENT
Start: 2020-05-25 | End: 2020-07-31

## 2020-05-25 RX ORDER — ALPRAZOLAM 0.25 MG/1
0.25 TABLET ORAL 2 TIMES DAILY PRN
Qty: 15 TABLET | Refills: 0 | Status: SHIPPED | OUTPATIENT
Start: 2020-05-25 | End: 2022-06-03

## 2020-05-25 NOTE — LETTER
May 25, 2020      [facility]  [address] [place] [id number]  Phone: [phone]  Fax: [phone]       Patient: Julieth Randhawa   YOB: 1995  Date of Visit: 05/25/2020    To Whom It May Concern:    Melissa Randhawa  was at [facility] on 05/25/2020. Please excuse Ms. Oconnor as she provided transportation for the patient. She may return to work on 5/26/2020 with no restrictions. If you have any questions or concerns, or if I can be of further assistance, please do not hesitate to contact me.    Sincerely,    Hazel Rome MD

## 2020-05-25 NOTE — PROGRESS NOTES
"Subjective:       Patient ID: Julieth Randhawa is a 25 y.o. female.    Chief Complaint: Follow-up and Anxiety      HPI:  Patient is known to me but has not seen me in nearly 1 year and presents to discuss anxiety. She reports she feels constant worry and stress. She is having panic attacks where she gets chest pains and feels SOB. Worse in public or social situations. Sx started several years ago. She was on prozac and trazodone in the past. She stopped taking trazodone due to side effects ("dizziness") and stopped prozac due to cost but also did not it helpful. Tried zoloft ("zombie out"), buspar ("fatigued"), unsure if tried welbutrin. She is not sleeping well. Appetite is ok but does admit to emotional eating.     History reviewed. No pertinent past medical history.    Family History   Problem Relation Age of Onset    Hypertension Mother        Social History     Socioeconomic History    Marital status: Single     Spouse name: Not on file    Number of children: Not on file    Years of education: Not on file    Highest education level: Not on file   Occupational History    Not on file   Social Needs    Financial resource strain: Not on file    Food insecurity:     Worry: Not on file     Inability: Not on file    Transportation needs:     Medical: Not on file     Non-medical: Not on file   Tobacco Use    Smoking status: Never Smoker    Smokeless tobacco: Never Used   Substance and Sexual Activity    Alcohol use: Never     Frequency: Never    Drug use: Never    Sexual activity: Not on file   Lifestyle    Physical activity:     Days per week: Not on file     Minutes per session: Not on file    Stress: Not on file   Relationships    Social connections:     Talks on phone: Not on file     Gets together: Not on file     Attends Congregation service: Not on file     Active member of club or organization: Not on file     Attends meetings of clubs or organizations: Not on file     Relationship status: Not on file "   Other Topics Concern    Not on file   Social History Narrative    Not on file       Review of Systems   Constitutional: Negative for activity change, fatigue, fever and unexpected weight change.   HENT: Negative for congestion, ear pain, hearing loss, rhinorrhea and sore throat.    Eyes: Negative for pain, redness and visual disturbance.   Respiratory: Negative for cough, shortness of breath and wheezing.    Cardiovascular: Negative for chest pain, palpitations and leg swelling.   Gastrointestinal: Negative for abdominal pain, constipation, diarrhea, nausea and vomiting.   Genitourinary: Negative for dysuria, frequency and urgency.   Musculoskeletal: Negative for back pain, joint swelling and neck pain.   Skin: Negative for color change, rash and wound.   Neurological: Negative for dizziness, tremors, weakness, light-headedness and headaches.   Psychiatric/Behavioral: The patient is nervous/anxious.          Objective:      Physical Exam   Constitutional: She is oriented to person, place, and time. She appears well-developed and well-nourished. No distress.   HENT:   Head: Normocephalic and atraumatic.   Right Ear: External ear normal.   Left Ear: External ear normal.   Eyes: Pupils are equal, round, and reactive to light. Conjunctivae and EOM are normal. Right eye exhibits no discharge. Left eye exhibits no discharge.   Neck: Neck supple. No tracheal deviation present.   Cardiovascular: Normal rate and regular rhythm.   No murmur heard.  Pulmonary/Chest: Effort normal and breath sounds normal. No respiratory distress. She has no wheezes. She has no rales.   Abdominal: Soft. Bowel sounds are normal. She exhibits no distension. There is no tenderness.   Musculoskeletal: She exhibits no edema.   Neurological: She is alert and oriented to person, place, and time. No cranial nerve deficit.   Skin: Skin is warm and dry.   Psychiatric: She has a normal mood and affect. Her behavior is normal.   Vitals reviewed.       Assessment:       1. RENE (generalized anxiety disorder)    2. Panic attack    3. Morbid obesity with BMI of 45.0-49.9, adult        Plan:       Julieth was seen today for follow-up and anxiety.    Diagnoses and all orders for this visit:    RENE (generalized anxiety disorder)  -     escitalopram oxalate (LEXAPRO) 5 MG Tab; Take 1 tablet (5 mg total) by mouth once daily.  -     ALPRAZolam (XANAX) 0.25 MG tablet; Take 1 tablet (0.25 mg total) by mouth 2 (two) times daily as needed for Anxiety.    Panic attack  -     escitalopram oxalate (LEXAPRO) 5 MG Tab; Take 1 tablet (5 mg total) by mouth once daily.  -     ALPRAZolam (XANAX) 0.25 MG tablet; Take 1 tablet (0.25 mg total) by mouth 2 (two) times daily as needed for Anxiety.    Chronic uncontrolled  Trial of lexapro  Side effects of SSRI discussed today in detail including risk of SI. Voiced understanding  Understands may take 4-8 weeks for full effect    Morbid obesity with BMI of 45.0-49.9, adult  Diet and exercise discussed  Watch for weight gain with Lexapro use    RTC 4 weeks and PRN

## 2020-06-15 ENCOUNTER — OFFICE VISIT (OUTPATIENT)
Dept: INTERNAL MEDICINE | Facility: CLINIC | Age: 25
End: 2020-06-15
Payer: COMMERCIAL

## 2020-06-15 VITALS
WEIGHT: 261.69 LBS | HEART RATE: 77 BPM | OXYGEN SATURATION: 98 % | DIASTOLIC BLOOD PRESSURE: 73 MMHG | RESPIRATION RATE: 16 BRPM | HEIGHT: 63 IN | BODY MASS INDEX: 46.37 KG/M2 | TEMPERATURE: 98 F | SYSTOLIC BLOOD PRESSURE: 126 MMHG

## 2020-06-15 DIAGNOSIS — G47.00 INSOMNIA, UNSPECIFIED TYPE: ICD-10-CM

## 2020-06-15 DIAGNOSIS — F41.1 GAD (GENERALIZED ANXIETY DISORDER): Primary | ICD-10-CM

## 2020-06-15 DIAGNOSIS — E66.01 MORBID OBESITY WITH BMI OF 45.0-49.9, ADULT: ICD-10-CM

## 2020-06-15 PROCEDURE — 99999 PR PBB SHADOW E&M-EST. PATIENT-LVL IV: CPT | Mod: PBBFAC,,, | Performed by: INTERNAL MEDICINE

## 2020-06-15 PROCEDURE — 99999 PR PBB SHADOW E&M-EST. PATIENT-LVL IV: ICD-10-PCS | Mod: PBBFAC,,, | Performed by: INTERNAL MEDICINE

## 2020-06-15 PROCEDURE — 99214 PR OFFICE/OUTPT VISIT, EST, LEVL IV, 30-39 MIN: ICD-10-PCS | Mod: S$GLB,,, | Performed by: INTERNAL MEDICINE

## 2020-06-15 PROCEDURE — 3008F PR BODY MASS INDEX (BMI) DOCUMENTED: ICD-10-PCS | Mod: CPTII,S$GLB,, | Performed by: INTERNAL MEDICINE

## 2020-06-15 PROCEDURE — 99214 OFFICE O/P EST MOD 30 MIN: CPT | Mod: S$GLB,,, | Performed by: INTERNAL MEDICINE

## 2020-06-15 PROCEDURE — 3008F BODY MASS INDEX DOCD: CPT | Mod: CPTII,S$GLB,, | Performed by: INTERNAL MEDICINE

## 2020-06-15 RX ORDER — TRAZODONE HYDROCHLORIDE 50 MG/1
50 TABLET ORAL NIGHTLY
Qty: 30 TABLET | Refills: 11 | Status: SHIPPED | OUTPATIENT
Start: 2020-06-15 | End: 2020-07-31

## 2020-06-15 NOTE — PROGRESS NOTES
"Subjective:       Patient ID: Julieth Randhawa is a 25 y.o. female.    Chief Complaint: Follow-up (4 wk)      HPI:  Patient is known to me and presents for follow up anxiety. At last visit "She reports she feels constant worry and stress. She is having panic attacks where she gets chest pains and feels SOB. Worse in public or social situations. Sx started several years ago. She was on prozac and trazodone in the past. She stopped taking trazodone due to side effects ("dizziness") and stopped prozac due to cost but also did not it helpful. Tried zoloft ("zombie out"), buspar ("fatigued"), unsure if tried welbutrin. She is not sleeping well. Appetite is ok but does admit to emotional eating." We started lexparo 5mg last visit.       Since then she reports improvement in her sx. Mood is better. Doesn't feel as "negative". Finds it help her depression sx more than her anxiety sx. She still feels nervous in social situations. She reports she is still not sleeping well; maybe even worse since starting the medications. She is taking at night, if she takes in AM feels woozy during the days. However, she feels this is the best medication she has tried so far for her mood.       History reviewed. No pertinent past medical history.    Family History   Problem Relation Age of Onset    Hypertension Mother        Social History     Socioeconomic History    Marital status: Single     Spouse name: Not on file    Number of children: Not on file    Years of education: Not on file    Highest education level: Not on file   Occupational History    Not on file   Social Needs    Financial resource strain: Not on file    Food insecurity     Worry: Not on file     Inability: Not on file    Transportation needs     Medical: Not on file     Non-medical: Not on file   Tobacco Use    Smoking status: Never Smoker    Smokeless tobacco: Never Used   Substance and Sexual Activity    Alcohol use: Never     Frequency: Never    Drug use: " Never    Sexual activity: Not on file   Lifestyle    Physical activity     Days per week: Not on file     Minutes per session: Not on file    Stress: Not on file   Relationships    Social connections     Talks on phone: Not on file     Gets together: Not on file     Attends Oriental orthodox service: Not on file     Active member of club or organization: Not on file     Attends meetings of clubs or organizations: Not on file     Relationship status: Not on file   Other Topics Concern    Not on file   Social History Narrative    Not on file       Review of Systems   Constitutional: Negative for activity change, fatigue, fever and unexpected weight change.   HENT: Negative for congestion, ear pain, hearing loss, rhinorrhea and sore throat.    Eyes: Negative for pain, redness and visual disturbance.   Respiratory: Negative for cough, shortness of breath and wheezing.    Cardiovascular: Negative for chest pain, palpitations and leg swelling.   Gastrointestinal: Negative for abdominal pain, blood in stool, constipation, diarrhea, nausea and vomiting.   Genitourinary: Negative for dysuria, frequency, pelvic pain and urgency.   Musculoskeletal: Negative for back pain, joint swelling and neck pain.   Skin: Negative for color change, rash and wound.   Neurological: Negative for dizziness, tremors, weakness, light-headedness and headaches.   Psychiatric/Behavioral: Positive for sleep disturbance. The patient is nervous/anxious.          Objective:      Physical Exam  Vitals signs reviewed.   Constitutional:       General: She is not in acute distress.     Appearance: She is well-developed.   HENT:      Head: Normocephalic and atraumatic.      Right Ear: External ear normal.      Left Ear: External ear normal.   Eyes:      General:         Right eye: No discharge.         Left eye: No discharge.      Conjunctiva/sclera: Conjunctivae normal.      Pupils: Pupils are equal, round, and reactive to light.   Neck:       Musculoskeletal: Neck supple.      Thyroid: No thyromegaly.   Cardiovascular:      Rate and Rhythm: Normal rate and regular rhythm.      Heart sounds: No murmur.   Pulmonary:      Effort: Pulmonary effort is normal. No respiratory distress.      Breath sounds: Normal breath sounds. No wheezing or rales.   Abdominal:      General: Bowel sounds are normal. There is no distension.      Palpations: Abdomen is soft.      Tenderness: There is no abdominal tenderness.   Lymphadenopathy:      Cervical: No cervical adenopathy.   Skin:     General: Skin is warm and dry.   Neurological:      Mental Status: She is alert and oriented to person, place, and time.      Cranial Nerves: No cranial nerve deficit.   Psychiatric:         Behavior: Behavior normal.         Assessment:       1. RENE (generalized anxiety disorder)    2. Morbid obesity with BMI of 45.0-49.9, adult    3. Insomnia, unspecified type        Plan:       Julieth was seen today for follow-up.    Diagnoses and all orders for this visit:    RENE (generalized anxiety disorder)  Chronic stable  Much better on lexapro, continue same dose    Morbid obesity with BMI of 45.0-49.9, adult  Diet and exercise discussed    Insomnia, unspecified type  -     traZODone (DESYREL) 50 MG tablet; Take 1 tablet (50 mg total) by mouth every evening.  Uncontrolled  Add trazodone  Side effects discussed  Follow up 4 weeks. If sleep improves but she still feels anxiety needs better control adjust lexapro at next visit    RTC 4 weeks

## 2020-07-31 ENCOUNTER — OFFICE VISIT (OUTPATIENT)
Dept: INTERNAL MEDICINE | Facility: CLINIC | Age: 25
End: 2020-07-31
Payer: COMMERCIAL

## 2020-07-31 VITALS
OXYGEN SATURATION: 98 % | SYSTOLIC BLOOD PRESSURE: 116 MMHG | DIASTOLIC BLOOD PRESSURE: 70 MMHG | HEART RATE: 79 BPM | BODY MASS INDEX: 46.6 KG/M2 | TEMPERATURE: 99 F | WEIGHT: 263 LBS | RESPIRATION RATE: 17 BRPM | HEIGHT: 63 IN

## 2020-07-31 DIAGNOSIS — E66.01 MORBID OBESITY WITH BMI OF 45.0-49.9, ADULT: ICD-10-CM

## 2020-07-31 DIAGNOSIS — G47.00 INSOMNIA, UNSPECIFIED TYPE: ICD-10-CM

## 2020-07-31 DIAGNOSIS — F41.1 GAD (GENERALIZED ANXIETY DISORDER): Primary | ICD-10-CM

## 2020-07-31 PROCEDURE — 99999 PR PBB SHADOW E&M-EST. PATIENT-LVL IV: CPT | Mod: PBBFAC,,, | Performed by: INTERNAL MEDICINE

## 2020-07-31 PROCEDURE — 3008F PR BODY MASS INDEX (BMI) DOCUMENTED: ICD-10-PCS | Mod: CPTII,S$GLB,, | Performed by: INTERNAL MEDICINE

## 2020-07-31 PROCEDURE — 99214 OFFICE O/P EST MOD 30 MIN: CPT | Mod: S$GLB,,, | Performed by: INTERNAL MEDICINE

## 2020-07-31 PROCEDURE — 99214 PR OFFICE/OUTPT VISIT, EST, LEVL IV, 30-39 MIN: ICD-10-PCS | Mod: S$GLB,,, | Performed by: INTERNAL MEDICINE

## 2020-07-31 PROCEDURE — 99999 PR PBB SHADOW E&M-EST. PATIENT-LVL IV: ICD-10-PCS | Mod: PBBFAC,,, | Performed by: INTERNAL MEDICINE

## 2020-07-31 PROCEDURE — 3008F BODY MASS INDEX DOCD: CPT | Mod: CPTII,S$GLB,, | Performed by: INTERNAL MEDICINE

## 2020-07-31 RX ORDER — ESCITALOPRAM OXALATE 10 MG/1
10 TABLET ORAL DAILY
Qty: 30 TABLET | Refills: 11 | Status: SHIPPED | OUTPATIENT
Start: 2020-07-31 | End: 2021-10-04

## 2020-07-31 NOTE — PROGRESS NOTES
Subjective:       Patient ID: Julieth Randhawa is a 25 y.o. female.    Chief Complaint: Follow-up      HPI:  Patient is known to me and presents for follow up depression, anxiety and insomnia. At last visit we continued lexapro and added trazodone 50mg QHS. Today she reports she stopped taking the trazodone as was causing nightmares. She still feels she needs to increase her lexapro dose. She is using her Xanax more frequently. She is taking in the morning. No significant side effects with lexapro. She is having more panic attacks. Still feel overall her anxiety is better on the medication but having depressed mood and loss of interest still. Denies SI.     History reviewed. No pertinent past medical history.    Family History   Problem Relation Age of Onset    Hypertension Mother        Social History     Socioeconomic History    Marital status: Single     Spouse name: Not on file    Number of children: Not on file    Years of education: Not on file    Highest education level: Not on file   Occupational History    Not on file   Social Needs    Financial resource strain: Not on file    Food insecurity     Worry: Not on file     Inability: Not on file    Transportation needs     Medical: Not on file     Non-medical: Not on file   Tobacco Use    Smoking status: Never Smoker    Smokeless tobacco: Never Used   Substance and Sexual Activity    Alcohol use: Never     Frequency: Never    Drug use: Never    Sexual activity: Not on file   Lifestyle    Physical activity     Days per week: Not on file     Minutes per session: Not on file    Stress: Not on file   Relationships    Social connections     Talks on phone: Not on file     Gets together: Not on file     Attends Episcopal service: Not on file     Active member of club or organization: Not on file     Attends meetings of clubs or organizations: Not on file     Relationship status: Not on file   Other Topics Concern    Not on file   Social History  Narrative    Not on file       Review of Systems   Constitutional: Negative for activity change, fatigue, fever and unexpected weight change.   HENT: Negative for congestion, ear pain, hearing loss, rhinorrhea and sore throat.    Eyes: Negative for pain, redness and visual disturbance.   Respiratory: Negative for cough, shortness of breath and wheezing.    Cardiovascular: Negative for chest pain, palpitations and leg swelling.   Gastrointestinal: Negative for abdominal pain, constipation, diarrhea, nausea and vomiting.   Genitourinary: Negative for dysuria, frequency and urgency.   Musculoskeletal: Negative for back pain, joint swelling and neck pain.   Skin: Negative for color change, rash and wound.   Neurological: Negative for dizziness, tremors, weakness, light-headedness and headaches.   Psychiatric/Behavioral: Positive for dysphoric mood and sleep disturbance. The patient is nervous/anxious.          Objective:      Physical Exam  Vitals signs reviewed.   Constitutional:       General: She is not in acute distress.     Appearance: She is well-developed.   HENT:      Head: Normocephalic and atraumatic.      Right Ear: Tympanic membrane and external ear normal.      Left Ear: Tympanic membrane and external ear normal.      Nose: Nose normal.   Eyes:      Extraocular Movements: Extraocular movements intact.      Conjunctiva/sclera: Conjunctivae normal.      Pupils: Pupils are equal, round, and reactive to light.   Neck:      Musculoskeletal: Neck supple.      Thyroid: No thyromegaly.   Cardiovascular:      Rate and Rhythm: Normal rate and regular rhythm.      Heart sounds: No murmur. No friction rub. No gallop.    Pulmonary:      Effort: Pulmonary effort is normal. No respiratory distress.      Breath sounds: Normal breath sounds. No wheezing or rales.   Abdominal:      General: Bowel sounds are normal. There is no distension.      Palpations: Abdomen is soft.      Tenderness: There is no abdominal tenderness.    Lymphadenopathy:      Cervical: No cervical adenopathy.   Skin:     General: Skin is warm and dry.   Neurological:      Mental Status: She is alert and oriented to person, place, and time.      Cranial Nerves: No cranial nerve deficit.   Psychiatric:         Behavior: Behavior normal.         Assessment:       1. RENE (generalized anxiety disorder)    2. Morbid obesity with BMI of 45.0-49.9, adult    3. Insomnia, unspecified type        Plan:       Julieth was seen today for follow-up.    Diagnoses and all orders for this visit:    RENE (generalized anxiety disorder)  -     escitalopram oxalate (LEXAPRO) 10 MG tablet; Take 1 tablet (10 mg total) by mouth once daily.  Chronic uncontrolled  Increase lexapro 5 to 10mg dialy  Cont PRN Xanax for now  Discussed counseling--had bad experience in childhood so feels she can't open  Denies SI  followup 4 weeks    Morbid obesity with BMI of 45.0-49.9, adult  Diet and exercise  Monitor weigh ton SSRI    Insomnia, unspecified type  Chronic stable  Increase lexapro  Follow symptoms  Failed trazodone--nightmares    RTC 4 weeks and PRN

## 2021-05-06 ENCOUNTER — PATIENT MESSAGE (OUTPATIENT)
Dept: RESEARCH | Facility: HOSPITAL | Age: 26
End: 2021-05-06

## 2021-08-18 ENCOUNTER — TELEPHONE (OUTPATIENT)
Dept: INTERNAL MEDICINE | Facility: CLINIC | Age: 26
End: 2021-08-18

## 2021-10-27 DIAGNOSIS — F41.0 PANIC ATTACK: ICD-10-CM

## 2021-10-27 DIAGNOSIS — F41.1 GAD (GENERALIZED ANXIETY DISORDER): ICD-10-CM

## 2021-10-27 RX ORDER — ALPRAZOLAM 0.25 MG/1
0.25 TABLET ORAL 2 TIMES DAILY PRN
Qty: 15 TABLET | Refills: 0 | Status: CANCELLED | OUTPATIENT
Start: 2021-10-27 | End: 2021-11-26

## 2021-10-27 RX ORDER — ESCITALOPRAM OXALATE 10 MG/1
10 TABLET ORAL DAILY
Qty: 30 TABLET | Refills: 0 | Status: SHIPPED | OUTPATIENT
Start: 2021-10-27 | End: 2021-11-17

## 2021-11-17 ENCOUNTER — OFFICE VISIT (OUTPATIENT)
Dept: INTERNAL MEDICINE | Facility: CLINIC | Age: 26
End: 2021-11-17

## 2021-11-17 VITALS
HEART RATE: 82 BPM | HEIGHT: 63 IN | RESPIRATION RATE: 16 BRPM | DIASTOLIC BLOOD PRESSURE: 74 MMHG | OXYGEN SATURATION: 98 % | WEIGHT: 259.69 LBS | BODY MASS INDEX: 46.01 KG/M2 | SYSTOLIC BLOOD PRESSURE: 126 MMHG

## 2021-11-17 DIAGNOSIS — G47.00 INSOMNIA, UNSPECIFIED TYPE: ICD-10-CM

## 2021-11-17 DIAGNOSIS — F41.1 GAD (GENERALIZED ANXIETY DISORDER): Primary | ICD-10-CM

## 2021-11-17 DIAGNOSIS — E66.01 MORBID OBESITY WITH BMI OF 45.0-49.9, ADULT: ICD-10-CM

## 2021-11-17 PROCEDURE — 99999 PR PBB SHADOW E&M-EST. PATIENT-LVL III: ICD-10-PCS | Mod: PBBFAC,,, | Performed by: INTERNAL MEDICINE

## 2021-11-17 PROCEDURE — 99214 PR OFFICE/OUTPT VISIT, EST, LEVL IV, 30-39 MIN: ICD-10-PCS | Mod: S$PBB,,, | Performed by: INTERNAL MEDICINE

## 2021-11-17 PROCEDURE — 99213 OFFICE O/P EST LOW 20 MIN: CPT | Mod: PBBFAC | Performed by: INTERNAL MEDICINE

## 2021-11-17 PROCEDURE — 99999 PR PBB SHADOW E&M-EST. PATIENT-LVL III: CPT | Mod: PBBFAC,,, | Performed by: INTERNAL MEDICINE

## 2021-11-17 PROCEDURE — 99214 OFFICE O/P EST MOD 30 MIN: CPT | Mod: S$PBB,,, | Performed by: INTERNAL MEDICINE

## 2021-11-17 RX ORDER — ESCITALOPRAM OXALATE 20 MG/1
20 TABLET ORAL DAILY
Qty: 30 TABLET | Refills: 11 | Status: SHIPPED | OUTPATIENT
Start: 2021-11-17 | End: 2022-04-22

## 2021-11-17 RX ORDER — HYDROXYZINE HYDROCHLORIDE 25 MG/1
25 TABLET, FILM COATED ORAL 3 TIMES DAILY PRN
Qty: 30 TABLET | Refills: 1 | Status: SHIPPED | OUTPATIENT
Start: 2021-11-17 | End: 2022-02-28

## 2022-02-21 ENCOUNTER — HOSPITAL ENCOUNTER (EMERGENCY)
Facility: HOSPITAL | Age: 27
Discharge: HOME | End: 2022-02-21
Attending: SURGERY
Payer: MEDICAID

## 2022-02-21 VITALS
SYSTOLIC BLOOD PRESSURE: 148 MMHG | OXYGEN SATURATION: 98 % | WEIGHT: 250 LBS | RESPIRATION RATE: 16 BRPM | BODY MASS INDEX: 44.29 KG/M2 | TEMPERATURE: 99 F | HEART RATE: 80 BPM | DIASTOLIC BLOOD PRESSURE: 80 MMHG

## 2022-02-21 DIAGNOSIS — M54.50 ACUTE BILATERAL LOW BACK PAIN WITHOUT SCIATICA: Primary | ICD-10-CM

## 2022-02-21 LAB
ALBUMIN SERPL BCP-MCNC: 3.8 G/DL (ref 3.5–5.2)
ALP SERPL-CCNC: 78 U/L (ref 55–135)
ALT SERPL W/O P-5'-P-CCNC: 24 U/L (ref 10–44)
AMPHET+METHAMPHET UR QL: NEGATIVE
ANION GAP SERPL CALC-SCNC: 11 MMOL/L (ref 8–16)
AST SERPL-CCNC: 19 U/L (ref 10–40)
B-HCG UR QL: NEGATIVE
BARBITURATES UR QL SCN>200 NG/ML: NEGATIVE
BASOPHILS # BLD AUTO: 0.08 K/UL (ref 0–0.2)
BASOPHILS NFR BLD: 0.8 % (ref 0–1.9)
BENZODIAZ UR QL SCN>200 NG/ML: NEGATIVE
BILIRUB SERPL-MCNC: 0.3 MG/DL (ref 0.1–1)
BILIRUB UR QL STRIP: NEGATIVE
BUN SERPL-MCNC: 12 MG/DL (ref 6–20)
BZE UR QL SCN: NEGATIVE
CALCIUM SERPL-MCNC: 9.5 MG/DL (ref 8.7–10.5)
CANNABINOIDS UR QL SCN: NEGATIVE
CHLORIDE SERPL-SCNC: 107 MMOL/L (ref 95–110)
CLARITY UR: CLEAR
CO2 SERPL-SCNC: 21 MMOL/L (ref 23–29)
COLOR UR: YELLOW
CREAT SERPL-MCNC: 0.7 MG/DL (ref 0.5–1.4)
CREAT UR-MCNC: 151.1 MG/DL (ref 15–325)
DIFFERENTIAL METHOD: NORMAL
EOSINOPHIL # BLD AUTO: 0.3 K/UL (ref 0–0.5)
EOSINOPHIL NFR BLD: 3.6 % (ref 0–8)
ERYTHROCYTE [DISTWIDTH] IN BLOOD BY AUTOMATED COUNT: 13.5 % (ref 11.5–14.5)
EST. GFR  (AFRICAN AMERICAN): >60 ML/MIN/1.73 M^2
EST. GFR  (NON AFRICAN AMERICAN): >60 ML/MIN/1.73 M^2
GLUCOSE SERPL-MCNC: 90 MG/DL (ref 70–110)
GLUCOSE UR QL STRIP: NEGATIVE
HCT VFR BLD AUTO: 41 % (ref 37–48.5)
HGB BLD-MCNC: 13.3 G/DL (ref 12–16)
HGB UR QL STRIP: ABNORMAL
IMM GRANULOCYTES # BLD AUTO: 0.02 K/UL (ref 0–0.04)
IMM GRANULOCYTES NFR BLD AUTO: 0.2 % (ref 0–0.5)
KETONES UR QL STRIP: NEGATIVE
LEUKOCYTE ESTERASE UR QL STRIP: NEGATIVE
LIPASE SERPL-CCNC: 22 U/L (ref 4–60)
LYMPHOCYTES # BLD AUTO: 3.4 K/UL (ref 1–4.8)
LYMPHOCYTES NFR BLD: 35.1 % (ref 18–48)
MCH RBC QN AUTO: 28.4 PG (ref 27–31)
MCHC RBC AUTO-ENTMCNC: 32.4 G/DL (ref 32–36)
MCV RBC AUTO: 88 FL (ref 82–98)
METHADONE UR QL SCN>300 NG/ML: NEGATIVE
MONOCYTES # BLD AUTO: 0.8 K/UL (ref 0.3–1)
MONOCYTES NFR BLD: 8.7 % (ref 4–15)
NEUTROPHILS # BLD AUTO: 4.9 K/UL (ref 1.8–7.7)
NEUTROPHILS NFR BLD: 51.6 % (ref 38–73)
NITRITE UR QL STRIP: NEGATIVE
NRBC BLD-RTO: 0 /100 WBC
OPIATES UR QL SCN: NEGATIVE
PCP UR QL SCN>25 NG/ML: NEGATIVE
PH UR STRIP: 7 [PH] (ref 5–8)
PLATELET # BLD AUTO: 326 K/UL (ref 150–450)
PMV BLD AUTO: 10.9 FL (ref 9.2–12.9)
POTASSIUM SERPL-SCNC: 4.2 MMOL/L (ref 3.5–5.1)
PROT SERPL-MCNC: 7.2 G/DL (ref 6–8.4)
PROT UR QL STRIP: NEGATIVE
RBC # BLD AUTO: 4.68 M/UL (ref 4–5.4)
SODIUM SERPL-SCNC: 139 MMOL/L (ref 136–145)
SP GR UR STRIP: 1.02 (ref 1–1.03)
TOXICOLOGY INFORMATION: NORMAL
URN SPEC COLLECT METH UR: ABNORMAL
UROBILINOGEN UR STRIP-ACNC: NEGATIVE EU/DL
WBC # BLD AUTO: 9.57 K/UL (ref 3.9–12.7)

## 2022-02-21 PROCEDURE — 99284 EMERGENCY DEPT VISIT MOD MDM: CPT | Mod: 25

## 2022-02-21 PROCEDURE — 80307 DRUG TEST PRSMV CHEM ANLYZR: CPT | Performed by: SURGERY

## 2022-02-21 PROCEDURE — 63600175 PHARM REV CODE 636 W HCPCS: Performed by: SURGERY

## 2022-02-21 PROCEDURE — 36415 COLL VENOUS BLD VENIPUNCTURE: CPT | Performed by: SURGERY

## 2022-02-21 PROCEDURE — 85025 COMPLETE CBC W/AUTO DIFF WBC: CPT | Performed by: SURGERY

## 2022-02-21 PROCEDURE — 96372 THER/PROPH/DIAG INJ SC/IM: CPT | Mod: 59

## 2022-02-21 PROCEDURE — 81003 URINALYSIS AUTO W/O SCOPE: CPT | Mod: 59 | Performed by: SURGERY

## 2022-02-21 PROCEDURE — 83690 ASSAY OF LIPASE: CPT | Performed by: SURGERY

## 2022-02-21 PROCEDURE — 80053 COMPREHEN METABOLIC PANEL: CPT | Performed by: SURGERY

## 2022-02-21 PROCEDURE — 81025 URINE PREGNANCY TEST: CPT | Performed by: SURGERY

## 2022-02-21 RX ORDER — MORPHINE SULFATE 2 MG/ML
2 INJECTION, SOLUTION INTRAMUSCULAR; INTRAVENOUS
Status: COMPLETED | OUTPATIENT
Start: 2022-02-21 | End: 2022-02-21

## 2022-02-21 RX ORDER — IBUPROFEN 800 MG/1
800 TABLET ORAL EVERY 6 HOURS PRN
Qty: 20 TABLET | Refills: 0 | Status: SHIPPED | OUTPATIENT
Start: 2022-02-21 | End: 2022-02-28

## 2022-02-21 RX ORDER — ONDANSETRON 2 MG/ML
4 INJECTION INTRAMUSCULAR; INTRAVENOUS
Status: COMPLETED | OUTPATIENT
Start: 2022-02-21 | End: 2022-02-21

## 2022-02-21 RX ADMIN — MORPHINE SULFATE 2 MG: 2 INJECTION, SOLUTION INTRAMUSCULAR; INTRAVENOUS at 09:02

## 2022-02-21 RX ADMIN — ONDANSETRON HYDROCHLORIDE 4 MG: 2 SOLUTION INTRAMUSCULAR; INTRAVENOUS at 09:02

## 2022-02-22 ENCOUNTER — TELEPHONE (OUTPATIENT)
Dept: OBSTETRICS AND GYNECOLOGY | Facility: CLINIC | Age: 27
End: 2022-02-22
Payer: MEDICAID

## 2022-02-22 NOTE — ED PROVIDER NOTES
[facility]                                                 I reviewed the ER triage nurse's note before evaluating the patient    Chief Complaint  26 y.o. female with Back Pain   -- Pt c/o low back pain to right side, radiating down right leg starting 1 week ago.    -- Pt states she had endometriosis flare up which worsened low back pain.    -- Pt states pain radiating down leg makes it difficult to walk.     History of Present Illness  Julieth Randhawa presents to the emergency room with low back pain today  Patient with chronic low back pain issues due to endometriosis for years  Patient was seeing in OBGYN at [place] was lost to follow-up per HX  Patient on ER evaluation has no signs of peritonitis, no signs of guarding  Denies any dysuria hematuria, denies any vaginal discharge this evening  Denies any risk for STD or PID; has chronic back pain with endometriosis    The history is provided by the patient  [facility] medical [facility]  Previous records are summarized from prior ER visits and hospitalizations  History reviewed. No pertinent past medical history.   Surgeries: Tonsillectomy   No Known Allergies   No significant family history  No significant social history, nonsmoker    I have reviewed all of this patient's past medical, surgical, family, and social   histories as well as active allergies and medications documented in the  electronic medical record    Review of Systems and Physical Exam      Review of Systems (all other ROS are otherwise negative)  -- Constitution - no fever, denies fatigue, no weakness, no chills, night sweats  -- Eyes - no tearing or redness, no visual disturbance  -- Ear, Nose - no tinnitus or earache, no nasal congestion or discharge  -- Mouth,Throat - no sore throat, no toothache, normal voice, normal swallowing  -- Respiratory - denies cough and congestion, no shortness of breath, no DENG  -- Cardiovascular - denies chest pain, no  palpitations, denies claudication  -- Gastrointestinal - denies abdominal pain, nausea, vomiting, or diarrhea  -- Genitourinary - no dysuria, no hematuria, no flank pain, no bladder pain  -- Musculoskeletal - back pain, negative for trauma or injury  -- Neurological - no headache, no numbness, tingling, seizure, balance issues  -- Hematologic- no bruising, no bleeding, nose bleed, bleeding disorders    Vital Signs (reviewed by the physician)  Her oral temperature is 99.1 °F (37.3 °C).   Her blood pressure is 151/76 and her pulse is 86.   Her respiration is 16 and oxygen saturation is 98%.     Physical Exam  -- Nursing note and vitals reviewed  -- Constitutional: Appears well-developed and well-nourished  -- Head: Atraumatic. Normocephalic. No obvious abnormality  -- Eyes: Pupils are equal and reactive to light. Normal conjunctiva and lids  -- Cardiac: Normal rate, regular rhythm and normal heart sounds  -- Respiratory: Normal respiratory effort, breath sounds clear to auscultation  -- Gastrointestinal: Soft, no tenderness. Normal bowel sounds. Normal liver edge  -- Genitourinary: no flank pain on exam, no suprapubic pain by palpation   -- Musculoskeletal: Normal range of motion, no effusions. Joints stable   -- Neurological: No focal deficits. Showed good interaction with staff  -- Vascular: Posterior tibial, dorsalis pedis and radial pulses 2+ bilaterally      Emergency Room Course      Lab Results (reviewed by the physician)     K 4.2      CO2 21 (L)   BUN 12   CREATININE 0.7   GLU 90   ALKPHOS 78   AST 19   ALT 24   BILITOT 0.3   ALBUMIN 3.8   PROT 7.2   WBC 9.57   HGB 13.3   HCT 41.0        Urinalysis (reviewed by the physician)  -- Urinalysis performed during this ER visit showed no signs of infection   -- The urine pregnancy test today was negative; patient informed of the results      Medications Given  morphine injection 2 mg (has no administration in time range)   ondansetron injection 4  mg (has no administration in time range)     Medical Decision Making     Initial Assessment  -- patient with low back pain associated with chronic endometriosis  -- patient denies any dysuria hematuria, denies any vaginal discharge  -- patient was lost to follow-up by her OBGYN at [place] 2 years ago  -- states she just got back Medicaid and is currently seeking endometriosis help  -- patient states that she has endometriosis pain on a daily basis, like tonight  -- no signs of acute abdomen, normal lumbar spine, normal neuro exam in the    Differential Diagnosis  -- back pain, strain, back injury, kidney stone, UTI, pyelonephritis, endometriosis    Clinical Tests  -- Lab tests were ordered, interpreted, and reviewed in the ER today y    ED Course/ED Management  -- patient with low back pain with chronic endometriosis issues  -- negative lab work, negative urinalysis in the emergency room today  -- negative pregnancy test in the emergency room today, chronic pain issues  -- patient given IM morphine in the ER with good relief of pain on ER discharge  -- Motrin for pain, patient will follow-up with primary care physician and OBGYN  -- return to the ER with any concerning symptoms after discharge today    Assessment, Disposition, & Plan      Diagnosis  [M54.50] Acute bilateral low back pain without sciatica (Primary)    Disposition and Plan  -- Disposition: home  -- Condition: stable  -- Follow-up: Patient to follow up with MD in 1-2 days.  -- I advised the patient that we have found no life threatening condition today  -- At this time, I believe the patient is clinically stable for discharge.   -- Pt understands that the visit today is to address immediate concerns   -- Further workup and evaluation as an outpatient may be required  -- The patient acknowledges that close follow up with a MD is required   -- Patient agrees to comply with all instruction and direction given in the ER    This note is dictated on  M*Modal word recognition program.  There are word recognition mistakes that are occasionally missed on review.         Shawn Lerner MD  02/21/22 [id number]

## 2022-02-22 NOTE — TELEPHONE ENCOUNTER
Patient states she has endometriosis and it is getting worse. Has never had it diagnosed with surgery. Appt scheduled for evaluation.

## 2022-02-22 NOTE — TELEPHONE ENCOUNTER
----- Message from Raven Zavaleta sent at 2/22/2022 12:26 PM CST -----  Type:  Needs Medical Advice    Who Called: self  Reason:schedule appointment for  endometriosis pain  Would the patient rather a call back or a response via [employer]? call  Best Call Back Number: [phone]  Additional Information: none

## 2022-02-22 NOTE — ED NOTES
MD reviewed results and discharge instructions with pt and pt verbalized understanding. Refer to MD notes for assessment. AVS printed and given to pt.

## 2022-02-23 ENCOUNTER — PATIENT OUTREACH (OUTPATIENT)
Dept: ADMINISTRATIVE | Facility: OTHER | Age: 27
End: 2022-02-23
Payer: MEDICAID

## 2022-02-23 NOTE — PROGRESS NOTES
Health Maintenance Due   Topic Date Due    Hepatitis C Screening  Never done    COVID-19 Vaccine (1) Never done    HPV Vaccines (1 - 2-dose series) Never done    HIV Screening  Never done    TETANUS VACCINE  Never done    Influenza Vaccine (1) Never done    Pap Smear  03/11/2022     Updates were requested from care everywhere.  Chart was reviewed for overdue [facility]active [facility] Encounters ([facility]) topics (CRS, Breast Cancer Screening, Eye exam)  Health Maintenance has been updated.  [facility] immunization registry triggered.  Immunizations were reconciled.

## 2022-02-28 ENCOUNTER — OFFICE VISIT (OUTPATIENT)
Dept: OBSTETRICS AND GYNECOLOGY | Facility: CLINIC | Age: 27
End: 2022-02-28
Payer: MEDICAID

## 2022-02-28 VITALS
DIASTOLIC BLOOD PRESSURE: 82 MMHG | HEIGHT: 63 IN | HEART RATE: 97 BPM | SYSTOLIC BLOOD PRESSURE: 136 MMHG | BODY MASS INDEX: 46.84 KG/M2 | WEIGHT: 264.38 LBS | RESPIRATION RATE: 15 BRPM

## 2022-02-28 DIAGNOSIS — N94.6 DYSMENORRHEA TREATED WITH ORAL CONTRACEPTIVE: ICD-10-CM

## 2022-02-28 DIAGNOSIS — N93.8 DUB (DYSFUNCTIONAL UTERINE BLEEDING): ICD-10-CM

## 2022-02-28 DIAGNOSIS — G89.29 CHRONIC FEMALE PELVIC PAIN: Primary | ICD-10-CM

## 2022-02-28 DIAGNOSIS — Z79.3 DYSMENORRHEA TREATED WITH ORAL CONTRACEPTIVE: ICD-10-CM

## 2022-02-28 DIAGNOSIS — R10.2 CHRONIC FEMALE PELVIC PAIN: Primary | ICD-10-CM

## 2022-02-28 PROCEDURE — 3079F PR MOST RECENT DIASTOLIC BLOOD PRESSURE 80-89 MM HG: ICD-10-PCS | Mod: CPTII,,, | Performed by: OBSTETRICS & GYNECOLOGY

## 2022-02-28 PROCEDURE — 3008F PR BODY MASS INDEX (BMI) DOCUMENTED: ICD-10-PCS | Mod: CPTII,,, | Performed by: OBSTETRICS & GYNECOLOGY

## 2022-02-28 PROCEDURE — 3008F BODY MASS INDEX DOCD: CPT | Mod: CPTII,,, | Performed by: OBSTETRICS & GYNECOLOGY

## 2022-02-28 PROCEDURE — 3079F DIAST BP 80-89 MM HG: CPT | Mod: CPTII,,, | Performed by: OBSTETRICS & GYNECOLOGY

## 2022-02-28 PROCEDURE — 1159F MED LIST DOCD IN RCRD: CPT | Mod: CPTII,,, | Performed by: OBSTETRICS & GYNECOLOGY

## 2022-02-28 PROCEDURE — 99204 PR OFFICE/OUTPT VISIT, NEW, LEVL IV, 45-59 MIN: ICD-10-PCS | Mod: S$PBB,,, | Performed by: OBSTETRICS & GYNECOLOGY

## 2022-02-28 PROCEDURE — 3075F PR MOST RECENT SYSTOLIC BLOOD PRESS GE 130-139MM HG: ICD-10-PCS | Mod: CPTII,,, | Performed by: OBSTETRICS & GYNECOLOGY

## 2022-02-28 PROCEDURE — 1159F PR MEDICATION LIST DOCUMENTED IN MEDICAL RECORD: ICD-10-PCS | Mod: CPTII,,, | Performed by: OBSTETRICS & GYNECOLOGY

## 2022-02-28 PROCEDURE — 99213 OFFICE O/P EST LOW 20 MIN: CPT | Mod: PBBFAC | Performed by: OBSTETRICS & GYNECOLOGY

## 2022-02-28 PROCEDURE — 1160F RVW MEDS BY RX/DR IN RCRD: CPT | Mod: CPTII,,, | Performed by: OBSTETRICS & GYNECOLOGY

## 2022-02-28 PROCEDURE — 99999 PR PBB SHADOW E&M-EST. PATIENT-LVL III: ICD-10-PCS | Mod: PBBFAC,,, | Performed by: OBSTETRICS & GYNECOLOGY

## 2022-02-28 PROCEDURE — 99999 PR PBB SHADOW E&M-EST. PATIENT-LVL III: CPT | Mod: PBBFAC,,, | Performed by: OBSTETRICS & GYNECOLOGY

## 2022-02-28 PROCEDURE — 1160F PR REVIEW ALL MEDS BY PRESCRIBER/CLIN PHARMACIST DOCUMENTED: ICD-10-PCS | Mod: CPTII,,, | Performed by: OBSTETRICS & GYNECOLOGY

## 2022-02-28 PROCEDURE — 3075F SYST BP GE 130 - 139MM HG: CPT | Mod: CPTII,,, | Performed by: OBSTETRICS & GYNECOLOGY

## 2022-02-28 PROCEDURE — 99204 OFFICE O/P NEW MOD 45 MIN: CPT | Mod: S$PBB,,, | Performed by: OBSTETRICS & GYNECOLOGY

## 2022-02-28 RX ORDER — NORGESTIMATE AND ETHINYL ESTRADIOL 0.25-0.035
1 KIT ORAL DAILY
Qty: 28 TABLET | Refills: 12 | Status: SHIPPED | OUTPATIENT
Start: 2022-02-28 | End: 2022-06-03

## 2022-02-28 RX ORDER — METHOCARBAMOL 750 MG/1
750 TABLET, FILM COATED ORAL EVERY 8 HOURS PRN
COMMUNITY
Start: 2022-02-25 | End: 2022-06-03

## 2022-02-28 NOTE — PROGRESS NOTES
Subjective:       Patient ID: Julieth Randhawa is a 26 y.o. female.    Chief Complaint:  Pain (Pt c/o pain from endometriosis, reports pain is in lower back,spine and hip. )      History of Present Illness  HPI  Pt here with complaints of abdominal pain.     Menarche: 13  Periods are irregular.  She will have one period per month that is very painful and lasts 7-8 days.  She will also occasionally have breakthrough bleed each month that last 2-5 days and varies.     Reports diagnosis of PCOS with her last OBGYN.  Reports ultrasound with cysts. She was also told that she has endometriosis based on symptoms. No diagnostic/confirmatory tests.      She has never been on birth control secondary to fear of weight gain.     She also has back pain and hip pain that radiates down the hips.  She was thinking this could be her endometriosis.     GYN & OB History  Patient's last menstrual period was 02/02/2022 (exact date).   Date of Last Pap: No result found    OB History   No obstetric history on file.       Review of Systems  Review of Systems   Constitutional: Negative for chills, diaphoresis, fatigue, fever and unexpected weight change.   HENT: Negative for congestion, hearing loss, rhinorrhea and sore throat.    Eyes: Negative for pain, discharge and visual disturbance.   Respiratory: Negative for apnea, cough, shortness of breath and wheezing.    Cardiovascular: Negative for chest pain, palpitations and leg swelling.   Gastrointestinal: Positive for abdominal pain. Negative for constipation, diarrhea, nausea and vomiting.   Endocrine: Negative for cold intolerance and heat intolerance.   Genitourinary: Positive for menstrual problem and pelvic pain. Negative for difficulty urinating, dyspareunia, dysuria, flank pain, frequency, genital sores, hematuria, vaginal bleeding, vaginal discharge and vaginal pain.   Musculoskeletal: Positive for myalgias. Negative for arthralgias, back pain and joint swelling.   Skin: Negative for  rash.   Neurological: Negative for dizziness, weakness, light-headedness, numbness and headaches.   Psychiatric/Behavioral: Negative for agitation and confusion. The patient is nervous/anxious.            Objective:    Physical Exam:   Constitutional: She is oriented to person, place, and time. She appears well-developed and well-nourished. No distress.    HENT:   Head: Normocephalic and atraumatic.    Eyes: Conjunctivae and EOM are normal.      Pulmonary/Chest: Effort normal.        Abdominal: Soft. There is no rebound and no guarding.     Genitourinary:    Vagina normal.   The external female genitalia was normal.   There is no tenderness or lesion on the right labia. There is no tenderness or lesion on the left labia. Cervix is normal. Right adnexum displays tenderness (mild). Right adnexum displays no fullness. Left adnexum displays tenderness (mild). Left adnexum displays no fullness. No  no vaginal discharge or tenderness in the vagina. Cervix exhibits no motion tenderness, no discharge and no friability. Uterus consistancy normal. and Uerus contour normal  Uterus is tender. Uterus is not deviated, not enlarged and not fixed. Normal urethral meatus.Urethra findings: no tendernessBladder findings: no bladder tenderness          Musculoskeletal: Normal range of motion.       Neurological: She is alert and oriented to person, place, and time.    Skin: Skin is warm and dry.    Psychiatric: She has a normal mood and affect. Her behavior is normal. Judgment and thought content normal.          Assessment:        1. Chronic female pelvic pain    2. DUB (dysfunctional uterine bleeding)    3. Dysmenorrhea treated with oral contraceptive               Plan:      Julieth was seen today for pain.    Diagnoses and all orders for this visit:    Chronic female pelvic pain  -     US Pelvis Comp with Transvag NON-OB (xpd; Future  -     norgestimate-ethinyl estradioL (SPRINTEC, 28,) 0.25-35 mg-mcg per tablet; Take 1 tablet by  mouth once daily.    DUB (dysfunctional uterine bleeding)  -     norgestimate-ethinyl estradioL (SPRINTEC, 28,) 0.25-35 mg-mcg per tablet; Take 1 tablet by mouth once daily.    Dysmenorrhea treated with oral contraceptive  -     norgestimate-ethinyl estradioL (SPRINTEC, 28,) 0.25-35 mg-mcg per tablet; Take 1 tablet by mouth once daily.

## 2022-04-22 ENCOUNTER — OFFICE VISIT (OUTPATIENT)
Dept: INTERNAL MEDICINE | Facility: CLINIC | Age: 27
End: 2022-04-22
Payer: MEDICAID

## 2022-04-22 VITALS
SYSTOLIC BLOOD PRESSURE: 124 MMHG | HEART RATE: 79 BPM | DIASTOLIC BLOOD PRESSURE: 80 MMHG | HEIGHT: 63 IN | OXYGEN SATURATION: 98 % | WEIGHT: 267 LBS | RESPIRATION RATE: 16 BRPM | BODY MASS INDEX: 47.31 KG/M2

## 2022-04-22 DIAGNOSIS — Z11.4 ENCOUNTER FOR SCREENING FOR HIV: ICD-10-CM

## 2022-04-22 DIAGNOSIS — J30.9 ALLERGIC RHINITIS, UNSPECIFIED SEASONALITY, UNSPECIFIED TRIGGER: ICD-10-CM

## 2022-04-22 DIAGNOSIS — E66.01 MORBID OBESITY WITH BMI OF 45.0-49.9, ADULT: ICD-10-CM

## 2022-04-22 DIAGNOSIS — L65.9 HAIR LOSS: ICD-10-CM

## 2022-04-22 DIAGNOSIS — F41.1 GAD (GENERALIZED ANXIETY DISORDER): Primary | ICD-10-CM

## 2022-04-22 DIAGNOSIS — Z11.59 NEED FOR HEPATITIS C SCREENING TEST: ICD-10-CM

## 2022-04-22 PROCEDURE — 3079F DIAST BP 80-89 MM HG: CPT | Mod: CPTII,,, | Performed by: INTERNAL MEDICINE

## 2022-04-22 PROCEDURE — 3008F BODY MASS INDEX DOCD: CPT | Mod: CPTII,,, | Performed by: INTERNAL MEDICINE

## 2022-04-22 PROCEDURE — 3008F PR BODY MASS INDEX (BMI) DOCUMENTED: ICD-10-PCS | Mod: CPTII,,, | Performed by: INTERNAL MEDICINE

## 2022-04-22 PROCEDURE — 1159F PR MEDICATION LIST DOCUMENTED IN MEDICAL RECORD: ICD-10-PCS | Mod: CPTII,,, | Performed by: INTERNAL MEDICINE

## 2022-04-22 PROCEDURE — 1160F PR REVIEW ALL MEDS BY PRESCRIBER/CLIN PHARMACIST DOCUMENTED: ICD-10-PCS | Mod: CPTII,,, | Performed by: INTERNAL MEDICINE

## 2022-04-22 PROCEDURE — 3074F SYST BP LT 130 MM HG: CPT | Mod: CPTII,,, | Performed by: INTERNAL MEDICINE

## 2022-04-22 PROCEDURE — 3079F PR MOST RECENT DIASTOLIC BLOOD PRESSURE 80-89 MM HG: ICD-10-PCS | Mod: CPTII,,, | Performed by: INTERNAL MEDICINE

## 2022-04-22 PROCEDURE — 99214 OFFICE O/P EST MOD 30 MIN: CPT | Mod: S$PBB,,, | Performed by: INTERNAL MEDICINE

## 2022-04-22 PROCEDURE — 99214 OFFICE O/P EST MOD 30 MIN: CPT | Mod: PBBFAC | Performed by: INTERNAL MEDICINE

## 2022-04-22 PROCEDURE — 99999 PR PBB SHADOW E&M-EST. PATIENT-LVL IV: ICD-10-PCS | Mod: PBBFAC,,, | Performed by: INTERNAL MEDICINE

## 2022-04-22 PROCEDURE — 3074F PR MOST RECENT SYSTOLIC BLOOD PRESSURE < 130 MM HG: ICD-10-PCS | Mod: CPTII,,, | Performed by: INTERNAL MEDICINE

## 2022-04-22 PROCEDURE — 1159F MED LIST DOCD IN RCRD: CPT | Mod: CPTII,,, | Performed by: INTERNAL MEDICINE

## 2022-04-22 PROCEDURE — 99999 PR PBB SHADOW E&M-EST. PATIENT-LVL IV: CPT | Mod: PBBFAC,,, | Performed by: INTERNAL MEDICINE

## 2022-04-22 PROCEDURE — 99214 PR OFFICE/OUTPT VISIT, EST, LEVL IV, 30-39 MIN: ICD-10-PCS | Mod: S$PBB,,, | Performed by: INTERNAL MEDICINE

## 2022-04-22 PROCEDURE — 1160F RVW MEDS BY RX/DR IN RCRD: CPT | Mod: CPTII,,, | Performed by: INTERNAL MEDICINE

## 2022-04-22 RX ORDER — BUPROPION HYDROCHLORIDE 150 MG/1
150 TABLET ORAL DAILY
Qty: 30 TABLET | Refills: 11 | Status: SHIPPED | OUTPATIENT
Start: 2022-04-22 | End: 2023-04-22

## 2022-04-22 NOTE — PROGRESS NOTES
Subjective:       Patient ID: Julieth Randhawa is a 26 y.o. female.    Chief Complaint: Follow-up      HPI:  Patient is known to me and presents for follow up depression, anxiety and insomnia.      At last visit we increased lexapro to 20mg. Failed trazodone in the past as causing nightmares. She does not feel the lexapro was helpful.  No significant side effects with lexapro. she feels her mood swings from very happy to very stressed/sad.  Her wife lost her job so she is working now and this is causing added stress.  Hydroxyzine for sleep/anxiety made her feel funny so she stopped taking.   Has not had Xanax filled since 5/2020. doesn't feel she needs anything for sleep right now. Wants to focus on anxiety sx mostly.    She also notes increasing fatigue and hair loss. Worsening over last few weeks. FH of thyroid disease so would like to be evaluated for this as well.     She is also reporting increasing allergy symptoms. Precipitated by environmental factors like pollen but also notices itchy throat with certain foods. No full blown anaphylaxis symptoms. She is requesting allergy testing.     History reviewed. No pertinent past medical history.    Family History   Problem Relation Age of Onset    Hypertension Mother        Social History     Socioeconomic History    Marital status: Single   Tobacco Use    Smoking status: Never Smoker    Smokeless tobacco: Never Used   Substance and Sexual Activity    Alcohol use: Never    Drug use: Never    Sexual activity: Yes     Partners: Female       Review of Systems   Constitutional: Positive for fatigue. Negative for activity change, fever and unexpected weight change.   HENT: Negative for congestion, ear pain, hearing loss, rhinorrhea and sore throat.    Eyes: Negative for pain, redness and visual disturbance.   Respiratory: Negative for cough, shortness of breath and wheezing.    Cardiovascular: Negative for chest pain, palpitations and leg swelling.   Gastrointestinal:  Negative for abdominal pain, constipation, diarrhea, nausea and vomiting.   Genitourinary: Negative for dysuria, frequency and urgency.   Musculoskeletal: Negative for back pain, joint swelling and neck pain.   Skin: Negative for color change, rash and wound.   Neurological: Negative for dizziness, tremors, weakness, light-headedness and headaches.   Psychiatric/Behavioral: The patient is nervous/anxious.          Objective:      Physical Exam  Vitals reviewed.   Constitutional:       General: She is not in acute distress.     Appearance: She is well-developed. She is obese.   HENT:      Head: Normocephalic and atraumatic.      Right Ear: External ear normal.      Left Ear: External ear normal.      Nose: Nose normal.   Eyes:      General:         Right eye: No discharge.         Left eye: No discharge.      Extraocular Movements: Extraocular movements intact.      Conjunctiva/sclera: Conjunctivae normal.      Pupils: Pupils are equal, round, and reactive to light.   Neck:      Thyroid: No thyromegaly.   Cardiovascular:      Rate and Rhythm: Normal rate and regular rhythm.   Pulmonary:      Effort: Pulmonary effort is normal. No respiratory distress.      Breath sounds: Normal breath sounds.   Abdominal:      General: Bowel sounds are normal. There is no distension.      Palpations: Abdomen is soft.      Tenderness: There is no abdominal tenderness.   Skin:     General: Skin is warm and dry.   Neurological:      Mental Status: She is alert and oriented to person, place, and time.      Cranial Nerves: No cranial nerve deficit.   Psychiatric:         Behavior: Behavior normal.         Thought Content: Thought content normal.         Assessment:       1. RENE (generalized anxiety disorder)    2. Morbid obesity with BMI of 45.0-49.9, adult    3. Hair loss    4. Encounter for screening for HIV    5. Need for hepatitis C screening test    6. Allergic rhinitis, unspecified seasonality, unspecified trigger        Plan:        Julieth was seen today for follow-up.    Diagnoses and all orders for this visit:    RENE (generalized anxiety disorder)  -     CBC Auto Differential; Future  -     Comprehensive Metabolic Panel; Future  -     TSH; Future  -     Lipid Panel; Future  -     buPROPion (WELLBUTRIN XL) 150 MG TB24 tablet; Take 1 tablet (150 mg total) by mouth once daily.  Chronic uncontrolled  Stop lexapro  Trial of wellbutrin which may also help with weight loss  Side effects discussed today in detail  Understands may take 4-8 weeks for full effect    Morbid obesity with BMI of 45.0-49.9, adult  -     CBC Auto Differential; Future  -     Comprehensive Metabolic Panel; Future  -     TSH; Future  -     Lipid Panel; Future  Chronic stable  Trial of wellbutrin  Diet, exercise, weight loss    Hair loss  -     CBC Auto Differential; Future  -     Comprehensive Metabolic Panel; Future  -     TSH; Future  -     Lipid Panel; Future  New problem  Check labs  FH of hypothyroidism reported    Encounter for screening for HIV  -     HIV 1/2 Ag/Ab (4th Gen); Future    Need for hepatitis C screening test  -     Hepatitis C Antibody; Future    Allergic rhinitis, unspecified seasonality, unspecified trigger  -     Ambulatory referral/consult to Allergy; Future  Worsening sx  She reports itching in throat with certain foods, wants allergy testing  referred    RTC 4-6 weeks and PRN

## 2022-05-03 ENCOUNTER — LAB VISIT (OUTPATIENT)
Dept: LAB | Facility: HOSPITAL | Age: 27
End: 2022-05-03
Attending: INTERNAL MEDICINE
Payer: MEDICAID

## 2022-05-03 DIAGNOSIS — F41.1 GAD (GENERALIZED ANXIETY DISORDER): ICD-10-CM

## 2022-05-03 DIAGNOSIS — Z11.4 ENCOUNTER FOR SCREENING FOR HIV: ICD-10-CM

## 2022-05-03 DIAGNOSIS — L65.9 HAIR LOSS: ICD-10-CM

## 2022-05-03 DIAGNOSIS — Z11.59 NEED FOR HEPATITIS C SCREENING TEST: ICD-10-CM

## 2022-05-03 DIAGNOSIS — E66.01 MORBID OBESITY WITH BMI OF 45.0-49.9, ADULT: ICD-10-CM

## 2022-05-03 LAB
ALBUMIN SERPL BCP-MCNC: 3.7 G/DL (ref 3.5–5.2)
ALP SERPL-CCNC: 94 U/L (ref 55–135)
ALT SERPL W/O P-5'-P-CCNC: 17 U/L (ref 10–44)
ANION GAP SERPL CALC-SCNC: 11 MMOL/L (ref 8–16)
AST SERPL-CCNC: 16 U/L (ref 10–40)
BASOPHILS # BLD AUTO: 0.08 K/UL (ref 0–0.2)
BASOPHILS NFR BLD: 1 % (ref 0–1.9)
BILIRUB SERPL-MCNC: 0.5 MG/DL (ref 0.1–1)
BUN SERPL-MCNC: 10 MG/DL (ref 6–20)
CALCIUM SERPL-MCNC: 9.1 MG/DL (ref 8.7–10.5)
CHLORIDE SERPL-SCNC: 106 MMOL/L (ref 95–110)
CHOLEST SERPL-MCNC: 181 MG/DL (ref 120–199)
CHOLEST/HDLC SERPL: 4.6 {RATIO} (ref 2–5)
CO2 SERPL-SCNC: 21 MMOL/L (ref 23–29)
CREAT SERPL-MCNC: 0.8 MG/DL (ref 0.5–1.4)
DIFFERENTIAL METHOD: NORMAL
EOSINOPHIL # BLD AUTO: 0.3 K/UL (ref 0–0.5)
EOSINOPHIL NFR BLD: 3.5 % (ref 0–8)
ERYTHROCYTE [DISTWIDTH] IN BLOOD BY AUTOMATED COUNT: 12.9 % (ref 11.5–14.5)
EST. GFR  (AFRICAN AMERICAN): >60 ML/MIN/1.73 M^2
EST. GFR  (NON AFRICAN AMERICAN): >60 ML/MIN/1.73 M^2
GLUCOSE SERPL-MCNC: 83 MG/DL (ref 70–110)
HCT VFR BLD AUTO: 41.3 % (ref 37–48.5)
HDLC SERPL-MCNC: 39 MG/DL (ref 40–75)
HDLC SERPL: 21.5 % (ref 20–50)
HGB BLD-MCNC: 13.6 G/DL (ref 12–16)
IMM GRANULOCYTES # BLD AUTO: 0.02 K/UL (ref 0–0.04)
IMM GRANULOCYTES NFR BLD AUTO: 0.2 % (ref 0–0.5)
LDLC SERPL CALC-MCNC: 115.2 MG/DL (ref 63–159)
LYMPHOCYTES # BLD AUTO: 1.9 K/UL (ref 1–4.8)
LYMPHOCYTES NFR BLD: 23.4 % (ref 18–48)
MCH RBC QN AUTO: 28 PG (ref 27–31)
MCHC RBC AUTO-ENTMCNC: 32.9 G/DL (ref 32–36)
MCV RBC AUTO: 85 FL (ref 82–98)
MONOCYTES # BLD AUTO: 0.5 K/UL (ref 0.3–1)
MONOCYTES NFR BLD: 6.6 % (ref 4–15)
NEUTROPHILS # BLD AUTO: 5.4 K/UL (ref 1.8–7.7)
NEUTROPHILS NFR BLD: 65.3 % (ref 38–73)
NONHDLC SERPL-MCNC: 142 MG/DL
NRBC BLD-RTO: 0 /100 WBC
PLATELET # BLD AUTO: 312 K/UL (ref 150–450)
PMV BLD AUTO: 10.8 FL (ref 9.2–12.9)
POTASSIUM SERPL-SCNC: 3.9 MMOL/L (ref 3.5–5.1)
PROT SERPL-MCNC: 7.2 G/DL (ref 6–8.4)
RBC # BLD AUTO: 4.85 M/UL (ref 4–5.4)
SODIUM SERPL-SCNC: 138 MMOL/L (ref 136–145)
TRIGL SERPL-MCNC: 134 MG/DL (ref 30–150)
TSH SERPL DL<=0.005 MIU/L-ACNC: 2.61 UIU/ML (ref 0.4–4)
WBC # BLD AUTO: 8.2 K/UL (ref 3.9–12.7)

## 2022-05-03 PROCEDURE — 80061 LIPID PANEL: CPT | Performed by: INTERNAL MEDICINE

## 2022-05-03 PROCEDURE — 80053 COMPREHEN METABOLIC PANEL: CPT | Performed by: INTERNAL MEDICINE

## 2022-05-03 PROCEDURE — 87389 HIV-1 AG W/HIV-1&-2 AB AG IA: CPT | Performed by: INTERNAL MEDICINE

## 2022-05-03 PROCEDURE — 36415 COLL VENOUS BLD VENIPUNCTURE: CPT | Performed by: INTERNAL MEDICINE

## 2022-05-03 PROCEDURE — 85025 COMPLETE CBC W/AUTO DIFF WBC: CPT | Performed by: INTERNAL MEDICINE

## 2022-05-03 PROCEDURE — 86803 HEPATITIS C AB TEST: CPT | Performed by: INTERNAL MEDICINE

## 2022-05-03 PROCEDURE — 84443 ASSAY THYROID STIM HORMONE: CPT | Performed by: INTERNAL MEDICINE

## 2022-05-06 LAB
HCV AB SERPL QL IA: NEGATIVE
HIV 1+2 AB+HIV1 P24 AG SERPL QL IA: NEGATIVE

## 2022-06-03 ENCOUNTER — TELEPHONE (OUTPATIENT)
Dept: CARDIOLOGY | Facility: CLINIC | Age: 27
End: 2022-06-03
Payer: MEDICAID

## 2022-06-03 ENCOUNTER — OFFICE VISIT (OUTPATIENT)
Dept: INTERNAL MEDICINE | Facility: CLINIC | Age: 27
End: 2022-06-03
Payer: MEDICAID

## 2022-06-03 VITALS
HEART RATE: 87 BPM | OXYGEN SATURATION: 98 % | HEIGHT: 63 IN | RESPIRATION RATE: 16 BRPM | SYSTOLIC BLOOD PRESSURE: 110 MMHG | DIASTOLIC BLOOD PRESSURE: 66 MMHG | BODY MASS INDEX: 47.8 KG/M2 | WEIGHT: 269.81 LBS

## 2022-06-03 DIAGNOSIS — E66.01 MORBID OBESITY WITH BMI OF 45.0-49.9, ADULT: ICD-10-CM

## 2022-06-03 DIAGNOSIS — G89.29 CHRONIC MIDLINE LOW BACK PAIN WITH RIGHT-SIDED SCIATICA: ICD-10-CM

## 2022-06-03 DIAGNOSIS — F41.1 GAD (GENERALIZED ANXIETY DISORDER): Primary | ICD-10-CM

## 2022-06-03 DIAGNOSIS — R55 PRE-SYNCOPE: ICD-10-CM

## 2022-06-03 DIAGNOSIS — M54.41 CHRONIC MIDLINE LOW BACK PAIN WITH RIGHT-SIDED SCIATICA: ICD-10-CM

## 2022-06-03 PROBLEM — G47.00 INSOMNIA: Status: RESOLVED | Noted: 2020-06-15 | Resolved: 2022-06-03

## 2022-06-03 PROCEDURE — 3074F SYST BP LT 130 MM HG: CPT | Mod: CPTII,,, | Performed by: INTERNAL MEDICINE

## 2022-06-03 PROCEDURE — 1160F RVW MEDS BY RX/DR IN RCRD: CPT | Mod: CPTII,,, | Performed by: INTERNAL MEDICINE

## 2022-06-03 PROCEDURE — 99999 PR PBB SHADOW E&M-EST. PATIENT-LVL IV: CPT | Mod: PBBFAC,,, | Performed by: INTERNAL MEDICINE

## 2022-06-03 PROCEDURE — 99214 OFFICE O/P EST MOD 30 MIN: CPT | Mod: S$PBB,,, | Performed by: INTERNAL MEDICINE

## 2022-06-03 PROCEDURE — 1159F MED LIST DOCD IN RCRD: CPT | Mod: CPTII,,, | Performed by: INTERNAL MEDICINE

## 2022-06-03 PROCEDURE — 3078F DIAST BP <80 MM HG: CPT | Mod: CPTII,,, | Performed by: INTERNAL MEDICINE

## 2022-06-03 PROCEDURE — 1160F PR REVIEW ALL MEDS BY PRESCRIBER/CLIN PHARMACIST DOCUMENTED: ICD-10-PCS | Mod: CPTII,,, | Performed by: INTERNAL MEDICINE

## 2022-06-03 PROCEDURE — 99214 PR OFFICE/OUTPT VISIT, EST, LEVL IV, 30-39 MIN: ICD-10-PCS | Mod: S$PBB,,, | Performed by: INTERNAL MEDICINE

## 2022-06-03 PROCEDURE — 3008F PR BODY MASS INDEX (BMI) DOCUMENTED: ICD-10-PCS | Mod: CPTII,,, | Performed by: INTERNAL MEDICINE

## 2022-06-03 PROCEDURE — 3008F BODY MASS INDEX DOCD: CPT | Mod: CPTII,,, | Performed by: INTERNAL MEDICINE

## 2022-06-03 PROCEDURE — 99214 OFFICE O/P EST MOD 30 MIN: CPT | Mod: PBBFAC | Performed by: INTERNAL MEDICINE

## 2022-06-03 PROCEDURE — 99999 PR PBB SHADOW E&M-EST. PATIENT-LVL IV: ICD-10-PCS | Mod: PBBFAC,,, | Performed by: INTERNAL MEDICINE

## 2022-06-03 PROCEDURE — 3074F PR MOST RECENT SYSTOLIC BLOOD PRESSURE < 130 MM HG: ICD-10-PCS | Mod: CPTII,,, | Performed by: INTERNAL MEDICINE

## 2022-06-03 PROCEDURE — 3078F PR MOST RECENT DIASTOLIC BLOOD PRESSURE < 80 MM HG: ICD-10-PCS | Mod: CPTII,,, | Performed by: INTERNAL MEDICINE

## 2022-06-03 PROCEDURE — 1159F PR MEDICATION LIST DOCUMENTED IN MEDICAL RECORD: ICD-10-PCS | Mod: CPTII,,, | Performed by: INTERNAL MEDICINE

## 2022-06-03 NOTE — TELEPHONE ENCOUNTER
----- Message from Alexsandra Sigala sent at 6/3/2022  1:20 PM CDT -----  Contact: Celine/IM  No patient name on file.  MRN: No patient ID available  : There is no date of birth on file.  PCP: No primary care provider on file.  No relevant phone numbers on file.      MESSAGE: Wanting to schedule appt next available

## 2022-06-03 NOTE — PROGRESS NOTES
"Subjective:       Patient ID: Julieth Randhawa is a 27 y.o. female.    Chief Complaint: Follow-up ([facility] physical)      HPI:  Patient is known to me and presents for follow up depression, anxiety and insomnia. labs from 5/3/22 personally reviewed and discussed with the patient today.      Anxiety:. Failed trazodone in the past as causing nightmares. She did not feel the lexapro was helpful. Hydroxyzine for sleep/anxiety made her feel funny so she stopped taking.   Has not had Xanax filled since 5/2020. doesn't feel she needs anything for sleep right now. Wants to focus on anxiety sx mostly.     At last visit we switched to wellbutrin. She reports she is sleeping well. No nightmares. Her anxiety sx are much better controlled. She does find it makes her much more fatigued and sleeps late into the day.     Today she is reporting spells of dizziness and feeling like she will pass out. She had two episodes where she did not loose consciousness but felt like she was going to black out "I was dazed". Lasts a few seconds/minutes. No precipitating factors identified. Denies chest pains, SOB, palpitations. Similar sx in 2019 but had resolved.     She is also having low back pain. Has had off an on for quite some time. Pain is b/l and radiating down the right leg. Intermittent. Worse with movement, better with rest. She was seeing chiropractor without much relief of sx.     History reviewed. No pertinent past medical history.    Family History   Problem Relation Age of Onset    Hypertension Mother        Social History     Socioeconomic History    Marital status: Single   Tobacco Use    Smoking status: Never Smoker    Smokeless tobacco: Never Used   Substance and Sexual Activity    Alcohol use: Never    Drug use: Never    Sexual activity: Yes     Partners: Female       Review of Systems   Constitutional: Negative for activity change, fatigue, fever and unexpected weight change.   HENT: Negative for congestion, ear " pain, hearing loss, rhinorrhea and sore throat.    Eyes: Negative for pain, redness and visual disturbance.   Respiratory: Negative for cough, shortness of breath and wheezing.    Cardiovascular: Negative for chest pain, palpitations and leg swelling.   Gastrointestinal: Negative for abdominal pain, constipation, diarrhea, nausea and vomiting.   Genitourinary: Negative for dysuria, frequency and urgency.   Musculoskeletal: Positive for back pain. Negative for joint swelling and neck pain.   Skin: Negative for color change, rash and wound.   Neurological: Positive for syncope (pre syncope but no true LOC). Negative for dizziness, tremors, weakness, light-headedness and headaches.         Objective:      Physical Exam  Vitals reviewed.   Constitutional:       General: She is not in acute distress.     Appearance: She is well-developed.   HENT:      Head: Normocephalic and atraumatic.      Right Ear: External ear normal.      Left Ear: External ear normal.      Nose: Nose normal.   Eyes:      General:         Right eye: No discharge.         Left eye: No discharge.      Extraocular Movements: Extraocular movements intact.      Conjunctiva/sclera: Conjunctivae normal.      Pupils: Pupils are equal, round, and reactive to light.   Neck:      Thyroid: No thyromegaly.   Cardiovascular:      Rate and Rhythm: Normal rate and regular rhythm.      Heart sounds: No murmur heard.  Pulmonary:      Effort: Pulmonary effort is normal. No respiratory distress.      Breath sounds: Normal breath sounds. No wheezing or rales.   Abdominal:      General: Bowel sounds are normal. There is no distension.      Palpations: Abdomen is soft.      Tenderness: There is no abdominal tenderness.   Skin:     General: Skin is warm and dry.   Neurological:      Mental Status: She is alert and oriented to person, place, and time.      Cranial Nerves: No cranial nerve deficit.   Psychiatric:         Behavior: Behavior normal.         Thought Content:  Thought content normal.         Assessment:       1. RENE (generalized anxiety disorder)    2. Morbid obesity with BMI of 45.0-49.9, adult    3. Pre-syncope    4. Chronic midline low back pain with right-sided sciatica        Plan:       Julieth was seen today for follow-up.    Diagnoses and all orders for this visit:    RENE (generalized anxiety disorder)  Chronic stable  Cont wellbutrin same dose    Morbid obesity with BMI of 45.0-49.9, adult  Diet, exercise, weight loss    Pre-syncope  -     Ambulatory referral/consult to Cardiology; Future  New problem  Had similar sx in 2019 that resolved  Labs reviewed, normal  Healthy diet: stay hydrated, avoid skipping meals, etc  Discussed and she would like to see cardiology to make sure this is not due to any sort of heart disease (?anxiety)    Chronic midline low back pain with right-sided sciatica  -     Ambulatory referral/consult to Physical/Occupational Therapy; Future  Chronic, worsening  PRN Tyelnol, NSAIDs  Weight loss  Start PT

## 2022-06-06 ENCOUNTER — TELEPHONE (OUTPATIENT)
Dept: CARDIOLOGY | Facility: CLINIC | Age: 27
End: 2022-06-06
Payer: MEDICAID

## 2022-06-06 NOTE — TELEPHONE ENCOUNTER
"Returned call    Reports history of "severe panic attacks" in which she reports she was followed by [facility] and request appt with Dr Tello to establish care     appt offered and accepted  6/17 at 2:00  "

## 2022-06-06 NOTE — TELEPHONE ENCOUNTER
----- Message from Katya Roberts sent at 6/3/2022  4:33 PM CDT -----  Type: Requesting to speak with nurse         Who Called: PT  Regarding: Returning call please advise   Would the patient rather a call back or a response via [facility]? Call back  Best Call Back Number: [phone]  Additional Information: n/a

## 2022-06-17 ENCOUNTER — OFFICE VISIT (OUTPATIENT)
Dept: CARDIOLOGY | Facility: CLINIC | Age: 27
End: 2022-06-17
Payer: MEDICAID

## 2022-06-17 VITALS
HEIGHT: 63 IN | OXYGEN SATURATION: 98 % | DIASTOLIC BLOOD PRESSURE: 60 MMHG | HEART RATE: 85 BPM | BODY MASS INDEX: 41.41 KG/M2 | SYSTOLIC BLOOD PRESSURE: 114 MMHG | WEIGHT: 233.69 LBS

## 2022-06-17 DIAGNOSIS — R55 NEAR SYNCOPE: ICD-10-CM

## 2022-06-17 DIAGNOSIS — R07.89 OTHER CHEST PAIN: ICD-10-CM

## 2022-06-17 DIAGNOSIS — R55 SYNCOPE AND COLLAPSE: Primary | ICD-10-CM

## 2022-06-17 DIAGNOSIS — E66.01 MORBID OBESITY WITH BMI OF 45.0-49.9, ADULT: ICD-10-CM

## 2022-06-17 PROCEDURE — 3008F BODY MASS INDEX DOCD: CPT | Mod: CPTII,,, | Performed by: INTERNAL MEDICINE

## 2022-06-17 PROCEDURE — 93010 EKG 12-LEAD: ICD-10-PCS | Mod: S$PBB,,, | Performed by: INTERNAL MEDICINE

## 2022-06-17 PROCEDURE — 1159F PR MEDICATION LIST DOCUMENTED IN MEDICAL RECORD: ICD-10-PCS | Mod: CPTII,,, | Performed by: INTERNAL MEDICINE

## 2022-06-17 PROCEDURE — 1160F RVW MEDS BY RX/DR IN RCRD: CPT | Mod: CPTII,,, | Performed by: INTERNAL MEDICINE

## 2022-06-17 PROCEDURE — 3074F SYST BP LT 130 MM HG: CPT | Mod: CPTII,,, | Performed by: INTERNAL MEDICINE

## 2022-06-17 PROCEDURE — 99204 OFFICE O/P NEW MOD 45 MIN: CPT | Mod: S$PBB,,, | Performed by: INTERNAL MEDICINE

## 2022-06-17 PROCEDURE — 99213 OFFICE O/P EST LOW 20 MIN: CPT | Mod: PBBFAC | Performed by: INTERNAL MEDICINE

## 2022-06-17 PROCEDURE — 99999 PR PBB SHADOW E&M-EST. PATIENT-LVL III: ICD-10-PCS | Mod: PBBFAC,,, | Performed by: INTERNAL MEDICINE

## 2022-06-17 PROCEDURE — 3008F PR BODY MASS INDEX (BMI) DOCUMENTED: ICD-10-PCS | Mod: CPTII,,, | Performed by: INTERNAL MEDICINE

## 2022-06-17 PROCEDURE — 1159F MED LIST DOCD IN RCRD: CPT | Mod: CPTII,,, | Performed by: INTERNAL MEDICINE

## 2022-06-17 PROCEDURE — 93010 ELECTROCARDIOGRAM REPORT: CPT | Mod: S$PBB,,, | Performed by: INTERNAL MEDICINE

## 2022-06-17 PROCEDURE — 3074F PR MOST RECENT SYSTOLIC BLOOD PRESSURE < 130 MM HG: ICD-10-PCS | Mod: CPTII,,, | Performed by: INTERNAL MEDICINE

## 2022-06-17 PROCEDURE — 3078F DIAST BP <80 MM HG: CPT | Mod: CPTII,,, | Performed by: INTERNAL MEDICINE

## 2022-06-17 PROCEDURE — 99204 PR OFFICE/OUTPT VISIT, NEW, LEVL IV, 45-59 MIN: ICD-10-PCS | Mod: S$PBB,,, | Performed by: INTERNAL MEDICINE

## 2022-06-17 PROCEDURE — 93005 ELECTROCARDIOGRAM TRACING: CPT | Mod: PBBFAC | Performed by: INTERNAL MEDICINE

## 2022-06-17 PROCEDURE — 3078F PR MOST RECENT DIASTOLIC BLOOD PRESSURE < 80 MM HG: ICD-10-PCS | Mod: CPTII,,, | Performed by: INTERNAL MEDICINE

## 2022-06-17 PROCEDURE — 1160F PR REVIEW ALL MEDS BY PRESCRIBER/CLIN PHARMACIST DOCUMENTED: ICD-10-PCS | Mod: CPTII,,, | Performed by: INTERNAL MEDICINE

## 2022-06-17 PROCEDURE — 99999 PR PBB SHADOW E&M-EST. PATIENT-LVL III: CPT | Mod: PBBFAC,,, | Performed by: INTERNAL MEDICINE

## 2022-06-17 NOTE — PROGRESS NOTES
[facility]     Subjective:    Patient ID:  Julieth Randhawa is a 27 y.o. female who presents for evaluation of Chest Pain and Pre Syncope    Review of patient's allergies indicates:  No Known Allergies   She is here for repeated episodes of near-syncope associated with tunnel vision.  She has not felt rapid heartbeats per se.  A couple of years ago she was referred to Cardiology.  She had a stress test.  She states she did not like the way she was treated and did not go back.  She has had symptoms with dating back years.  She admits to a primary anxiety disorder.    She has her spells on a weekly basis.  She is on Wellbutrin.  She is not a cigarette smoker.  She has had chest pain.  There is radiation to the right arm.      Her electrocardiogram reviewed and independently interpreted by myself confirms sinus rhythm heart rate 90 borderline voltage for LVH no ischemic ST changes no infarct pattern.    She reports chest discomforts retrosternal.  There is right arm discomfort during anterior chest discomfort complaints.  Her symptoms are not classically related to exertion.  She does not exercise on a regular basis.  Her most recent LDL is 115, HDL 39, triglycerides 134 mg%.  She is not a smoker.      Review of Systems   Constitutional: Negative for chills, decreased appetite, diaphoresis, fever, malaise/fatigue, night sweats, weight gain and weight loss.   HENT: Negative for congestion, ear discharge, ear pain, hearing loss, hoarse voice, nosebleeds, odynophagia, sore throat, stridor and tinnitus.    Eyes: Negative for blurred vision, discharge, double vision, pain, photophobia, redness, vision loss in left eye, vision loss in right eye, visual disturbance and visual halos.   Cardiovascular: Positive for chest pain and near-syncope. Negative for claudication, cyanosis, dyspnea on exertion, irregular heartbeat, leg swelling, orthopnea,  palpitations, paroxysmal nocturnal dyspnea and syncope.   Respiratory: Negative for cough, hemoptysis, shortness of breath, sleep disturbances due to breathing, snoring, sputum production and wheezing.    Endocrine: Negative for cold intolerance, heat intolerance, polydipsia, polyphagia and polyuria.   Hematologic/Lymphatic: Negative for adenopathy and bleeding problem. Does not bruise/bleed easily.   Skin: Negative for color change, dry skin, flushing, itching, nail changes, poor wound healing, rash, skin cancer, suspicious lesions and unusual hair distribution.   Musculoskeletal: Negative for arthritis, back pain, falls, gout, joint pain, joint swelling, muscle cramps, muscle weakness, myalgias, neck pain and stiffness.   Gastrointestinal: Negative for bloating, abdominal pain, anorexia, change in bowel habit, bowel incontinence, constipation, diarrhea, dysphagia, excessive appetite, flatus, heartburn, hematemesis, hematochezia, hemorrhoids, jaundice, melena, nausea and vomiting.   Genitourinary: Negative for bladder incontinence, decreased libido, dysuria, flank pain, frequency, genital sores, hematuria, hesitancy, incomplete emptying, nocturia and urgency.   Neurological: Negative for aphonia, brief paralysis, difficulty with concentration, disturbances in coordination, excessive daytime sleepiness, dizziness, focal weakness, headaches, light-headedness, loss of balance, numbness, paresthesias, seizures, sensory change, tremors, vertigo and weakness.   Psychiatric/Behavioral: Negative for altered mental status, depression, hallucinations, memory loss, substance abuse, suicidal ideas and thoughts of violence. The patient is nervous/anxious. The patient does not have insomnia.    Allergic/Immunologic: Negative for hives and persistent infections.        Objective:       Vitals:    06/17/22 1412   BP: 114/60   BP Location: Right arm   Patient Position: Sitting   BP Method: Large (Manual)   Pulse: 85   SpO2: 98%  "  Weight: 106 kg (233 lb 11 oz)   Height: 5' 3" (1.6 m)    Physical Exam  Constitutional:       General: She is not in acute distress.     Appearance: She is well-developed. She is not diaphoretic.   HENT:      Head: Normocephalic and atraumatic.      Nose: Nose normal.   Eyes:      General: No scleral icterus.        Right eye: No discharge.      Conjunctiva/sclera: Conjunctivae normal.      Pupils: Pupils are equal, round, and reactive to light.   Neck:      Thyroid: No thyromegaly.      Vascular: No JVD.      Trachea: No tracheal deviation.   Cardiovascular:      Rate and Rhythm: Normal rate and regular rhythm.      Pulses:           Carotid pulses are 2+ on the right side and 2+ on the left side.       Radial pulses are 2+ on the right side and 2+ on the left side.        Dorsalis pedis pulses are 2+ on the right side and 2+ on the left side.        Posterior tibial pulses are 2+ on the right side and 2+ on the left side.      Heart sounds: Normal heart sounds. No murmur heard.    No friction rub. No gallop.   Pulmonary:      Effort: Pulmonary effort is normal. No respiratory distress.      Breath sounds: Normal breath sounds. No stridor. No wheezing or rales.   Chest:      Chest wall: No tenderness.   Abdominal:      General: Bowel sounds are normal. There is no distension.      Palpations: Abdomen is soft. There is no mass.      Tenderness: There is no abdominal tenderness. There is no guarding or rebound.   Musculoskeletal:         General: No tenderness. Normal range of motion.      Cervical back: Normal range of motion and neck supple.   Lymphadenopathy:      Cervical: No cervical adenopathy.   Skin:     General: Skin is warm and dry.      Coloration: Skin is not pale.      Findings: No erythema or rash.   Neurological:      Mental Status: She is alert and oriented to person, place, and time.      Cranial Nerves: No cranial nerve deficit.      Coordination: Coordination normal.   Psychiatric:         " Behavior: Behavior normal.         Thought Content: Thought content normal.         Judgment: Judgment normal.           Assessment:       1. Syncope and collapse    2. Other chest pain    3. Near syncope    4. Morbid obesity with BMI of 45.0-49.9, adult      Results for orders placed or performed in visit on 05/03/22   CBC Auto Differential   Result Value Ref Range    WBC 8.20 3.90 - 12.70 K/uL    RBC 4.85 4.00 - 5.40 M/uL    Hemoglobin 13.6 12.0 - 16.0 g/dL    Hematocrit 41.3 37.0 - 48.5 %    MCV 85 82 - 98 fL    MCH 28.0 27.0 - 31.0 pg    MCHC 32.9 32.0 - 36.0 g/dL    RDW 12.9 11.5 - 14.5 %    Platelets 312 150 - 450 K/uL    MPV 10.8 9.2 - 12.9 fL    Immature Granulocytes 0.2 0.0 - 0.5 %    Gran # (ANC) 5.4 1.8 - 7.7 K/uL    Immature Grans (Abs) 0.02 0.00 - 0.04 K/uL    Lymph # 1.9 1.0 - 4.8 K/uL    Mono # 0.5 0.3 - 1.0 K/uL    Eos # 0.3 0.0 - 0.5 K/uL    Baso # 0.08 0.00 - 0.20 K/uL    nRBC 0 0 /100 WBC    Gran % 65.3 38.0 - 73.0 %    Lymph % 23.4 18.0 - 48.0 %    Mono % 6.6 4.0 - 15.0 %    Eosinophil % 3.5 0.0 - 8.0 %    Basophil % 1.0 0.0 - 1.9 %    Differential Method Automated    Comprehensive Metabolic Panel   Result Value Ref Range    Sodium 138 136 - 145 mmol/L    Potassium 3.9 3.5 - 5.1 mmol/L    Chloride 106 95 - 110 mmol/L    CO2 21 (L) 23 - 29 mmol/L    Glucose 83 70 - 110 mg/dL    BUN 10 6 - 20 mg/dL    Creatinine 0.8 0.5 - 1.4 mg/dL    Calcium 9.1 8.7 - 10.5 mg/dL    Total Protein 7.2 6.0 - 8.4 g/dL    Albumin 3.7 3.5 - 5.2 g/dL    Total Bilirubin 0.5 0.1 - 1.0 mg/dL    Alkaline Phosphatase 94 55 - 135 U/L    AST 16 10 - 40 U/L    ALT 17 10 - 44 U/L    Anion Gap 11 8 - 16 mmol/L    eGFR if African American >60 >60 mL/min/1.73 m^2    eGFR if non African American >60 >60 mL/min/1.73 m^2   TSH   Result Value Ref Range    TSH 2.606 0.400 - 4.000 uIU/mL   Lipid Panel   Result Value Ref Range    Cholesterol 181 120 - 199 mg/dL    Triglycerides 134 30 - 150 mg/dL    HDL 39 (L) 40 - 75 mg/dL    LDL  Cholesterol 115.2 63.0 - 159.0 mg/dL    HDL/Cholesterol Ratio 21.5 20.0 - 50.0 %    Total Cholesterol/HDL Ratio 4.6 2.0 - 5.0    Non-HDL Cholesterol 142 mg/dL   HIV 1/2 Ag/Ab (4th Gen)   Result Value Ref Range    HIV 1/2 Ag/Ab Negative Negative   Hepatitis C Antibody   Result Value Ref Range    Hepatitis C Ab Negative Negative         Current Outpatient Medications:     buPROPion (WELLBUTRIN XL) 150 MG TB24 tablet, Take 1 tablet (150 mg total) by mouth once daily., Disp: 30 tablet, Rfl: 11     Lab Results   Component Value Date    WBC 8.20 05/03/2022    RBC 4.85 05/03/2022    HGB 13.6 05/03/2022    HCT 41.3 05/03/2022    MCV 85 05/03/2022    MCH 28.0 05/03/2022    MCHC 32.9 05/03/2022    RDW 12.9 05/03/2022     05/03/2022    MPV 10.8 05/03/2022    GRAN 5.4 05/03/2022    GRAN 65.3 05/03/2022    LYMPH 1.9 05/03/2022    LYMPH 23.4 05/03/2022    MONO 0.5 05/03/2022    MONO 6.6 05/03/2022    EOS 0.3 05/03/2022    BASO 0.08 05/03/2022    EOSINOPHIL 3.5 05/03/2022    BASOPHIL 1.0 05/03/2022        CMP  Lab Results   Component Value Date     05/03/2022    K 3.9 05/03/2022     05/03/2022    CO2 21 (L) 05/03/2022    GLU 83 05/03/2022    BUN 10 05/03/2022    CREATININE 0.8 05/03/2022    CALCIUM 9.1 05/03/2022    PROT 7.2 05/03/2022    ALBUMIN 3.7 05/03/2022    BILITOT 0.5 05/03/2022    ALKPHOS 94 05/03/2022    AST 16 05/03/2022    ALT 17 05/03/2022    ANIONGAP 11 05/03/2022    ESTGFRAFRICA >60 05/03/2022    EGFRNONAA >60 05/03/2022        No results found for: LABBLOO, LABURIN, RESPIRATORYC, GSRESP         Results for orders placed or performed during the hospital encounter of 10/18/18   EKG 12-lead    Collection Time: 10/18/18  5:33 PM    Narrative    Test Reason : R05,  Blood Pressure : 130/70 mmHG  Vent. Rate : 092 BPM     Atrial Rate : 092 BPM     P-R Int : 146 ms          QRS Dur : 070 ms      QT Int : 352 ms       P-R-T Axes : 060 036 062 degrees     QTc Int : 435 ms    Normal sinus rhythm  Anterior  infarct ,age undetermined  Abnormal ECG  No previous ECGs available  Confirmed by Anny STEVENS, Citlalli ([id number]) on 10/19/2018 11:29:29 AM    Referred By: JAIME   SELF           Confirmed By:Citlalli Mccormick MD                  Plan:       Problem List Items Addressed This Visit        Cardiac/Vascular    Other chest pain     Her electrocardiogram was reviewed today.  No ischemic ST changes.  She is not tachycardic.  Reassurance provided.  It seems to trigger from anxiety.  Likely musculoskeletal etiology.              Endocrine    Morbid obesity with BMI of 45.0-49.9, adult     Weight loss encouraged.              Other    Near syncope     Her episodes are not on a daily basis.  A 30 day event monitor is ordered.  An echocardiogram is ordered.  Her symptoms of tunnel vision are recurrent.  It occurs every several days.             Other Visit Diagnoses     Syncope and collapse    -  Primary    Relevant Orders    EKG 12-lead    Cardiac event monitor    Echo               I recommended a 30 day event monitor and an echocardiogram.  The likelihood of tachycardia triggering her vertigo/tunnel vision events is quite low.  I do not advise stress testing at this time.  She has had a negative stress test in the past and has a low risk profile for coronary artery disease.    I asked her to return in 2 months at which time she should have completed her 30 day event monitor.    Thank you for allowing me to participate in your patient's care.            Vineet Tello MD  06/20/2022   2:55 PM

## 2022-06-20 PROBLEM — R07.89 OTHER CHEST PAIN: Status: ACTIVE | Noted: 2022-06-20

## 2022-06-20 PROBLEM — R55 NEAR SYNCOPE: Status: ACTIVE | Noted: 2022-06-20

## 2022-06-20 NOTE — ASSESSMENT & PLAN NOTE
Her electrocardiogram was reviewed today.  No ischemic ST changes.  She is not tachycardic.  Reassurance provided.  It seems to trigger from anxiety.  Likely musculoskeletal etiology.

## 2022-06-20 NOTE — ASSESSMENT & PLAN NOTE
Her episodes are not on a daily basis.  A 30 day event monitor is ordered.  An echocardiogram is ordered.  Her symptoms of tunnel vision are recurrent.  It occurs every several days.

## 2022-06-21 ENCOUNTER — CLINICAL SUPPORT (OUTPATIENT)
Dept: CARDIOLOGY | Facility: HOSPITAL | Age: 27
End: 2022-06-21
Attending: INTERNAL MEDICINE
Payer: MEDICAID

## 2022-06-21 ENCOUNTER — PATIENT OUTREACH (OUTPATIENT)
Dept: ADMINISTRATIVE | Facility: HOSPITAL | Age: 27
End: 2022-06-21
Payer: MEDICAID

## 2022-06-21 DIAGNOSIS — R55 SYNCOPE AND COLLAPSE: ICD-10-CM

## 2022-06-21 PROCEDURE — 93272 ECG/REVIEW INTERPRET ONLY: CPT | Mod: ,,, | Performed by: INTERNAL MEDICINE

## 2022-06-21 PROCEDURE — 93271 ECG/MONITORING AND ANALYSIS: CPT

## 2022-06-21 PROCEDURE — 93272 CARDIAC EVENT MONITOR (CUPID ONLY): ICD-10-PCS | Mod: ,,, | Performed by: INTERNAL MEDICINE

## 2022-06-21 NOTE — PROGRESS NOTES
Chart reviewed, immunization record updated.  No new results noted on [facility] or [facility] web site.  Care Everywhere updated.   Patient care coordination note updated.  LOV with PCP on 6/03/2022.  Spoke to patient, states she will call to schedule own appointment with GYN.

## 2022-06-22 ENCOUNTER — PATIENT MESSAGE (OUTPATIENT)
Dept: CARDIOLOGY | Facility: CLINIC | Age: 27
End: 2022-06-22
Payer: MEDICAID

## 2022-06-22 ENCOUNTER — TELEPHONE (OUTPATIENT)
Dept: CARDIOLOGY | Facility: CLINIC | Age: 27
End: 2022-06-22
Payer: MEDICAID

## 2022-06-22 ENCOUNTER — HOSPITAL ENCOUNTER (OUTPATIENT)
Dept: PULMONOLOGY | Facility: HOSPITAL | Age: 27
Discharge: HOME | End: 2022-06-22
Attending: INTERNAL MEDICINE
Payer: MEDICAID

## 2022-06-22 DIAGNOSIS — R55 SYNCOPE AND COLLAPSE: ICD-10-CM

## 2022-06-22 LAB
ASCENDING AORTA: 2.44 CM
AV INDEX (PROSTH): 0.93
AV MEAN GRADIENT: 4 MMHG
AV PEAK GRADIENT: 6 MMHG
AV VALVE AREA: 2.85 CM2
AV VELOCITY RATIO: 0.83
CV ECHO LV RWT: 0.43 CM
DOP CALC AO PEAK VEL: 1.2 M/S
DOP CALC AO VTI: 25.05 CM
DOP CALC LVOT AREA: 3 CM2
DOP CALC LVOT DIAMETER: 1.97 CM
DOP CALC LVOT PEAK VEL: 1 M/S
DOP CALC LVOT STROKE VOLUME: 71.29 CM3
DOP CALC RVOT PEAK VEL: 0.74 M/S
DOP CALC RVOT VTI: 17.41 CM
DOP CALCLVOT PEAK VEL VTI: 23.4 CM
E WAVE DECELERATION TIME: 256.61 MSEC
E/A RATIO: 1.37
E/E' RATIO: 5.19 M/S
ECHO LV POSTERIOR WALL: 0.95 CM (ref 0.6–1.1)
EJECTION FRACTION: 60 %
FRACTIONAL SHORTENING: 36 % (ref 28–44)
INTERVENTRICULAR SEPTUM: 1.09 CM (ref 0.6–1.1)
IVRT: 129.4 MSEC
LA MAJOR: 4.48 CM
LA MINOR: 4.29 CM
LA WIDTH: 2.85 CM
LEFT ATRIUM SIZE: 3.68 CM
LEFT ATRIUM VOLUME: 39.07 CM3
LEFT INTERNAL DIMENSION IN SYSTOLE: 2.83 CM (ref 2.1–4)
LEFT VENTRICLE DIASTOLIC VOLUME: 89.85 ML
LEFT VENTRICLE SYSTOLIC VOLUME: 30.42 ML
LEFT VENTRICULAR INTERNAL DIMENSION IN DIASTOLE: 4.45 CM (ref 3.5–6)
LEFT VENTRICULAR MASS: 154.72 G
LV LATERAL E/E' RATIO: 5.38 M/S
LV SEPTAL E/E' RATIO: 5 M/S
MV PEAK A VEL: 0.51 M/S
MV PEAK E VEL: 0.7 M/S
MV STENOSIS PRESSURE HALF TIME: 74.42 MS
MV VALVE AREA P 1/2 METHOD: 2.96 CM2
PISA TR MAX VEL: 1.9 M/S
PV MEAN GRADIENT: 1.31 MMHG
PV PEAK GRADIENT: 2.2 MMHG
PV PEAK VELOCITY: 0.39 CM/S
RA MAJOR: 3.69 CM
RA PRESSURE: 8 MMHG
RIGHT VENTRICULAR END-DIASTOLIC DIMENSION: 3.55 CM
STJ: 2.45 CM
TDI LATERAL: 0.13 M/S
TDI SEPTAL: 0.14 M/S
TDI: 0.14 M/S
TR MAX PG: 14 MMHG
TV REST PULMONARY ARTERY PRESSURE: 22 MMHG

## 2022-06-22 PROCEDURE — 93306 TTE W/DOPPLER COMPLETE: CPT

## 2022-06-22 PROCEDURE — 93306 ECHO (CUPID ONLY): ICD-10-PCS | Mod: 26,,, | Performed by: INTERNAL MEDICINE

## 2022-06-22 PROCEDURE — 93306 TTE W/DOPPLER COMPLETE: CPT | Mod: 26,,, | Performed by: INTERNAL MEDICINE

## 2022-06-24 ENCOUNTER — TELEPHONE (OUTPATIENT)
Dept: CARDIOLOGY | Facility: CLINIC | Age: 27
End: 2022-06-24
Payer: MEDICAID

## 2022-06-24 NOTE — TELEPHONE ENCOUNTER
----- Message from Alexsandra Sigala sent at 2022  3:46 PM CDT -----  Contact: self  Julieth Randhawa  MRN: [id number]  : 1995  PCP: Hazel Rome  Home Phone      [phone]  Work Phone      Not on file.  Mobile          [phone]      MESSAGE: wanting to speak to a nurse just  received the heart  monitor today and have questions and concerns.      Phone [phone]

## 2022-07-01 ENCOUNTER — TELEPHONE (OUTPATIENT)
Dept: ELECTROPHYSIOLOGY | Facility: CLINIC | Age: 27
End: 2022-07-01
Payer: MEDICAID

## 2022-07-01 NOTE — TELEPHONE ENCOUNTER
Patient wearing 30 day event monitor for diagnosis syncope     Received auto-triggered alert notification for SR/ST with syncope/fainted on 7/1/22 at 1507        Called patient to assess for symptoms.  No answer and voicemail was full.     Strips placed under this encounter for review. Message sent to ordering provider. Will continue to monitor until 7/23/22

## 2022-07-16 ENCOUNTER — HOSPITAL ENCOUNTER (EMERGENCY)
Facility: HOSPITAL | Age: 27
Discharge: LEFT BEFORE DISCHARGE | End: 2022-07-16
Attending: SURGERY
Payer: MEDICAID

## 2022-07-16 VITALS
RESPIRATION RATE: 20 BRPM | HEART RATE: 107 BPM | DIASTOLIC BLOOD PRESSURE: 83 MMHG | OXYGEN SATURATION: 99 % | SYSTOLIC BLOOD PRESSURE: 160 MMHG

## 2022-07-16 DIAGNOSIS — Z53.29 LEFT AGAINST MEDICAL ADVICE: Primary | ICD-10-CM

## 2022-07-16 DIAGNOSIS — M54.50 LOW BACK PAIN, UNSPECIFIED BACK PAIN LATERALITY, UNSPECIFIED CHRONICITY, UNSPECIFIED WHETHER SCIATICA PRESENT: ICD-10-CM

## 2022-07-16 PROCEDURE — 99284 EMERGENCY DEPT VISIT MOD MDM: CPT | Mod: 25

## 2022-07-16 RX ORDER — HYDROMORPHONE HYDROCHLORIDE 1 MG/ML
2 INJECTION, SOLUTION INTRAMUSCULAR; INTRAVENOUS; SUBCUTANEOUS
Status: DISCONTINUED | OUTPATIENT
Start: 2022-07-16 | End: 2022-07-17

## 2022-07-16 RX ORDER — ONDANSETRON 2 MG/ML
4 INJECTION INTRAMUSCULAR; INTRAVENOUS
Status: DISCONTINUED | OUTPATIENT
Start: 2022-07-16 | End: 2022-07-17

## 2022-07-17 NOTE — ED PROVIDER NOTES
Encounter Date: 7/16/2022       History     Chief Complaint   Patient presents with    Back Pain     Pt reports falling in house and injuring lower back.  Family reports pt had to be carried into vehicle to be transported to ED.  Pt unable to stand.  Unable to examine pt in triage due to pain.       27-year-old female presents with low back pain this evening after slip and fall  Patient fell the bathroom hitting her lower thoracic/upper lumbar area with the fall  Patient with no obvious signs of deficit, patient no obvious signs of weakness now  Patient denies any head trauma loss of consciousness with a slip and fall today  Patient has chronic low back pain issues, currently undergoing physical therapy        Review of patient's allergies indicates:  No Known Allergies  Past Medical History:   Diagnosis Date    Endometriosis     Heart attack 2019    Syncope and collapse      Past Surgical History:   Procedure Laterality Date    TONSILLECTOMY       Family History   Problem Relation Age of Onset    Hypertension Mother      Social History     Tobacco Use    Smoking status: Never Smoker    Smokeless tobacco: Never Used   Substance Use Topics    Alcohol use: Never    Drug use: Never     Review of Systems   Constitutional: Negative.    HENT: Negative.    Eyes: Negative.    Respiratory: Negative.    Cardiovascular: Negative.    Gastrointestinal: Negative.    Genitourinary: Negative for dysuria, urgency and vaginal discharge.   Musculoskeletal: Positive for back pain.   Skin: Negative.    Neurological: Negative.    Psychiatric/Behavioral: Negative.    All other systems reviewed and are negative.      Physical Exam     Initial Vitals [07/16/22 1931]   BP Pulse Resp Temp SpO2   (!) 160/83 107 20 -- 99 %      MAP       --         Physical Exam    Constitutional: She appears well-developed.   HENT:   Head: Normocephalic and atraumatic.   Right Ear: External ear normal.   Left Ear: External ear normal.   Nose: Nose  normal.   Mouth/Throat: Oropharynx is clear and moist.   Eyes: Conjunctivae and EOM are normal. Pupils are equal, round, and reactive to light.   Neck: Neck supple. No JVD present.   Normal range of motion.  Cardiovascular: Normal rate and regular rhythm.   Pulmonary/Chest: No respiratory distress. She has no wheezes. She has no rhonchi. She has no rales. She exhibits no tenderness.   Abdominal: Abdomen is soft. Bowel sounds are normal. She exhibits no distension. There is no abdominal tenderness. There is no rebound.   Musculoskeletal:         General: Normal range of motion.      Cervical back: Normal range of motion and neck supple.     Neurological: She is alert and oriented to person, place, and time. She has normal strength and normal reflexes.   Skin: Skin is warm and dry.         ED Course   Procedures  Labs Reviewed - No data to display       Imaging Results          CT Lumbar Spine Without Contrast (Final result)  Result time 07/16/22 20:42:38    Final result by Froy Ugalde MD (07/16/22 20:42:38)                 Impression:      No acute abnormality of the lumbar spine.      Electronically signed by: Froy Ugalde  Date:    07/16/2022  Time:    20:42             Narrative:    EXAMINATION:  CT LUMBAR SPINE WITHOUT CONTRAST    CLINICAL HISTORY:  Back trauma, no prior imaging (Age >= 16y);    TECHNIQUE:  Low-dose axial, sagittal and coronal reformations are obtained through the lumbar spine.  Contrast was not administered.    COMPARISON:  09/29/2009    FINDINGS:  The lumbar vertebral bodies demonstrate adequate alignment.The vertebral body heights are well-maintained.No fractures are identified.No secondary evidence of fracture (such as brittany-osseous hematoma) is identified.    Disc spaces appear adequately maintained.  .    L1-2: No significant abnormality.    L2-3: No significant abnormality.    L3-4: Mild diffuse disc bulge.  No significant central canal or foraminal narrowing.    L4-5: Mild diffuse  posterior disc osteophyte complex.  No significant central canal or foraminal narrowing.    L5-S1: No significant abnormality.    The remaining visualized paravertebral structures demonstrate no pathology.    No acute fracture or traumatic subluxation.                               CT Thoracic Spine Without Contrast (Final result)  Result time 07/16/22 20:38:23    Final result by Froy Ugalde MD (07/16/22 20:38:23)                 Impression:      No acute abnormality of the thoracic spine.      Electronically signed by: Froy Ugalde  Date:    07/16/2022  Time:    20:38             Narrative:    EXAMINATION:  CT THORACIC SPINE WITHOUT CONTRAST    CLINICAL HISTORY:  Back trauma, no prior imaging (Age >= 16y);    TECHNIQUE:  Low-dose axial images through the thoracic spine.    COMPARISON:  None    FINDINGS:  Vertebral body heights appear adequately maintained.  No acute fracture or traumatic subluxation.    Mild degenerative disc and endplate changes in the lower thoracic spine.    Central canal and neural foramen appear adequately maintained.    No paraspinal mass or fluid collection is identified.                                 Medications   HYDROmorphone injection 2 mg (has no administration in time range)   ondansetron injection 4 mg (has no administration in time range)     Medical Decision Making:   Initial Assessment:   Lower thoracic upper lumbar pain after a fall this evening  Denies any head trauma or loss of consciousness tonight    Differential Diagnosis:   Pain, sprain, fracture, dislocation, malingering, chronic pain    Clinical Tests:   Radiological Study: Ordered and Reviewed    ED Management:  Negative CT scan of the thoracic and lumbar spine, patient left the ER tonight  Patient states the ER was too busy left against medical advice this evening  Normal neuro exam, normal Radiology on ER evaluation before leaving                      Clinical Impression:   Final diagnoses:  [M54.50] Low back  pain, unspecified back pain laterality, unspecified chronicity, unspecified whether sciatica present  [Z53.29] Left against medical advice (Primary)          ED Disposition Condition    NIRANJAN Lerner MD  07/16/22 2053

## 2022-08-16 ENCOUNTER — PATIENT MESSAGE (OUTPATIENT)
Dept: CARDIOLOGY | Facility: CLINIC | Age: 27
End: 2022-08-16
Payer: MEDICAID

## 2022-08-19 ENCOUNTER — PATIENT MESSAGE (OUTPATIENT)
Dept: INTERNAL MEDICINE | Facility: CLINIC | Age: 27
End: 2022-08-19
Payer: MEDICAID

## 2022-08-19 ENCOUNTER — PATIENT MESSAGE (OUTPATIENT)
Dept: CARDIOLOGY | Facility: CLINIC | Age: 27
End: 2022-08-19
Payer: MEDICAID

## 2023-03-13 ENCOUNTER — PATIENT OUTREACH (OUTPATIENT)
Dept: ADMINISTRATIVE | Facility: HOSPITAL | Age: 28
End: 2023-03-13
Payer: MEDICAID

## 2023-03-13 NOTE — PROGRESS NOTES
Chart reviewed, immunization record updated.  No new results noted on [facility] or [facility] web site.  Care Everywhere updated.   Patient care coordination note updated.   LOV with PCP 06/03/2022.  Contacted patient to discuss Cervical cancer screening.  Patient states I am up to date.    Patient seen by Dr. Kris Barboza on 02/28/2022. No Pap Smear completed.   Patient will call clinic back to schedule.

## 2023-04-03 ENCOUNTER — PATIENT MESSAGE (OUTPATIENT)
Dept: ADMINISTRATIVE | Facility: HOSPITAL | Age: 28
End: 2023-04-03
Payer: MEDICAID

## 2023-04-20 ENCOUNTER — LAB VISIT (OUTPATIENT)
Dept: LAB | Facility: HOSPITAL | Age: 28
End: 2023-04-20
Attending: NURSE PRACTITIONER
Payer: MEDICAID

## 2023-04-20 DIAGNOSIS — Z13.220 SCREENING FOR HYPERLIPIDEMIA: ICD-10-CM

## 2023-04-20 DIAGNOSIS — E55.9 VITAMIN D DEFICIENCY: ICD-10-CM

## 2023-04-20 DIAGNOSIS — R53.83 FATIGUE: Primary | ICD-10-CM

## 2023-04-20 DIAGNOSIS — Z13.6 ENCOUNTER FOR SPECIAL SCREENING EXAMINATION FOR CARDIOVASCULAR DISORDER: ICD-10-CM

## 2023-04-20 LAB
25(OH)D3+25(OH)D2 SERPL-MCNC: 32 NG/ML (ref 30–96)
ALBUMIN SERPL BCP-MCNC: 3.9 G/DL (ref 3.5–5.2)
ALP SERPL-CCNC: 86 U/L (ref 55–135)
ALT SERPL W/O P-5'-P-CCNC: 18 U/L (ref 10–44)
ANION GAP SERPL CALC-SCNC: 11 MMOL/L (ref 8–16)
AST SERPL-CCNC: 17 U/L (ref 10–40)
BASOPHILS # BLD AUTO: 0.08 K/UL (ref 0–0.2)
BASOPHILS NFR BLD: 0.9 % (ref 0–1.9)
BILIRUB SERPL-MCNC: 0.5 MG/DL (ref 0.1–1)
BUN SERPL-MCNC: 11 MG/DL (ref 6–20)
CALCIUM SERPL-MCNC: 9.4 MG/DL (ref 8.7–10.5)
CHLORIDE SERPL-SCNC: 105 MMOL/L (ref 95–110)
CHOLEST SERPL-MCNC: 160 MG/DL (ref 120–199)
CHOLEST/HDLC SERPL: 4 {RATIO} (ref 2–5)
CO2 SERPL-SCNC: 22 MMOL/L (ref 23–29)
CREAT SERPL-MCNC: 0.8 MG/DL (ref 0.5–1.4)
DIFFERENTIAL METHOD: NORMAL
EOSINOPHIL # BLD AUTO: 0.3 K/UL (ref 0–0.5)
EOSINOPHIL NFR BLD: 2.9 % (ref 0–8)
ERYTHROCYTE [DISTWIDTH] IN BLOOD BY AUTOMATED COUNT: 13.4 % (ref 11.5–14.5)
EST. GFR  (NO RACE VARIABLE): >60 ML/MIN/1.73 M^2
ESTIMATED AVG GLUCOSE: 91 MG/DL (ref 68–131)
FOLATE SERPL-MCNC: 5.5 NG/ML (ref 4–24)
GLUCOSE SERPL-MCNC: 80 MG/DL (ref 70–110)
HBA1C MFR BLD: 4.8 % (ref 4–5.6)
HCT VFR BLD AUTO: 44.1 % (ref 37–48.5)
HDLC SERPL-MCNC: 40 MG/DL (ref 40–75)
HDLC SERPL: 25 % (ref 20–50)
HGB BLD-MCNC: 14.1 G/DL (ref 12–16)
IMM GRANULOCYTES # BLD AUTO: 0.03 K/UL (ref 0–0.04)
IMM GRANULOCYTES NFR BLD AUTO: 0.3 % (ref 0–0.5)
LDLC SERPL CALC-MCNC: 95.8 MG/DL (ref 63–159)
LYMPHOCYTES # BLD AUTO: 2.9 K/UL (ref 1–4.8)
LYMPHOCYTES NFR BLD: 30.9 % (ref 18–48)
MCH RBC QN AUTO: 28.1 PG (ref 27–31)
MCHC RBC AUTO-ENTMCNC: 32 G/DL (ref 32–36)
MCV RBC AUTO: 88 FL (ref 82–98)
MONOCYTES # BLD AUTO: 0.5 K/UL (ref 0.3–1)
MONOCYTES NFR BLD: 5.8 % (ref 4–15)
NEUTROPHILS # BLD AUTO: 5.6 K/UL (ref 1.8–7.7)
NEUTROPHILS NFR BLD: 59.2 % (ref 38–73)
NONHDLC SERPL-MCNC: 120 MG/DL
NRBC BLD-RTO: 0 /100 WBC
PLATELET # BLD AUTO: 331 K/UL (ref 150–450)
PMV BLD AUTO: 11.1 FL (ref 9.2–12.9)
POTASSIUM SERPL-SCNC: 3.8 MMOL/L (ref 3.5–5.1)
PROT SERPL-MCNC: 7.6 G/DL (ref 6–8.4)
RBC # BLD AUTO: 5.01 M/UL (ref 4–5.4)
SODIUM SERPL-SCNC: 138 MMOL/L (ref 136–145)
TRIGL SERPL-MCNC: 121 MG/DL (ref 30–150)
TSH SERPL DL<=0.005 MIU/L-ACNC: 1.94 UIU/ML (ref 0.4–4)
VIT B12 SERPL-MCNC: 452 PG/ML (ref 210–950)
WBC # BLD AUTO: 9.38 K/UL (ref 3.9–12.7)

## 2023-04-20 PROCEDURE — 80053 COMPREHEN METABOLIC PANEL: CPT | Performed by: NURSE PRACTITIONER

## 2023-04-20 PROCEDURE — 82306 VITAMIN D 25 HYDROXY: CPT | Performed by: NURSE PRACTITIONER

## 2023-04-20 PROCEDURE — 82607 VITAMIN B-12: CPT | Performed by: NURSE PRACTITIONER

## 2023-04-20 PROCEDURE — 80061 LIPID PANEL: CPT | Performed by: NURSE PRACTITIONER

## 2023-04-20 PROCEDURE — 85025 COMPLETE CBC W/AUTO DIFF WBC: CPT | Performed by: NURSE PRACTITIONER

## 2023-04-20 PROCEDURE — 84443 ASSAY THYROID STIM HORMONE: CPT | Performed by: NURSE PRACTITIONER

## 2023-04-20 PROCEDURE — 36415 COLL VENOUS BLD VENIPUNCTURE: CPT | Performed by: NURSE PRACTITIONER

## 2023-04-20 PROCEDURE — 82746 ASSAY OF FOLIC ACID SERUM: CPT | Performed by: NURSE PRACTITIONER

## 2023-04-20 PROCEDURE — 83036 HEMOGLOBIN GLYCOSYLATED A1C: CPT | Performed by: NURSE PRACTITIONER

## 2023-05-17 ENCOUNTER — PATIENT OUTREACH (OUTPATIENT)
Dept: ADMINISTRATIVE | Facility: HOSPITAL | Age: 28
End: 2023-05-17
Payer: MEDICAID

## 2023-05-17 NOTE — PROGRESS NOTES
Chart reviewed, immunization record updated.  No new results noted on [facility] or [facility] web site.  Care Everywhere updated.   Patient care coordination note updated.   LOV with PCP 06/03/2022.  Contacted patient to discuss Cervical cancer screening.  Seen by Dr. Kris Barboza on 02/28/2022, no Pap Smear completed on this visit.  Patient states as previously spoke about, she will schedule when she is ready.

## 2023-07-07 ENCOUNTER — PATIENT OUTREACH (OUTPATIENT)
Dept: ADMINISTRATIVE | Facility: HOSPITAL | Age: 28
End: 2023-07-07
Payer: MEDICAID

## 2023-10-23 ENCOUNTER — PATIENT OUTREACH (OUTPATIENT)
Dept: ADMINISTRATIVE | Facility: HOSPITAL | Age: 28
End: 2023-10-23
Payer: MEDICAID

## 2023-10-23 NOTE — PROGRESS NOTES
Chart reviewed, immunization record updated.  No new results noted on [facility] or [facility] web site.  Care Everywhere updated.   Patient care coordination note updated.   LOV with PCP 06/03/2022.  Attempted to contact patient to discuss cervical cancer screening and to obtain a remote blood pressure reading.    No answer, left voicemail for patient to return call to clinic.

## 2023-11-27 ENCOUNTER — PATIENT OUTREACH (OUTPATIENT)
Dept: ADMINISTRATIVE | Facility: HOSPITAL | Age: 28
End: 2023-11-27
Payer: MEDICAID

## 2023-11-27 NOTE — PROGRESS NOTES
Chart reviewed, immunization record updated.  No new results noted on [facility] or [facility] web site.  Care Everywhere updated.   Patient care coordination note updated.   LOV with PCP 06/03/2022.  Contacted patient to discuss Cervical cancer screening, obtaining a Remote Blood Pressure reading and scheduling PCP follow up.  No answer, left voicemail for patient to return call to clinic.

## 2024-01-23 ENCOUNTER — HOSPITAL ENCOUNTER (OUTPATIENT)
Dept: RADIOLOGY | Facility: HOSPITAL | Age: 29
Discharge: HOME | End: 2024-01-23
Attending: NURSE PRACTITIONER
Payer: MEDICAID

## 2024-01-23 DIAGNOSIS — M25.571 RIGHT ANKLE PAIN: ICD-10-CM

## 2024-01-23 DIAGNOSIS — M25.571 RIGHT ANKLE PAIN: Primary | ICD-10-CM

## 2024-01-23 PROCEDURE — 73610 X-RAY EXAM OF ANKLE: CPT | Mod: 26,RT,, | Performed by: RADIOLOGY

## 2024-01-23 PROCEDURE — 73610 X-RAY EXAM OF ANKLE: CPT | Mod: TC,RT

## 2024-03-06 ENCOUNTER — OFFICE VISIT (OUTPATIENT)
Dept: ORTHOPEDICS | Facility: CLINIC | Age: 29
End: 2024-03-06
Payer: MEDICAID

## 2024-03-06 VITALS
RESPIRATION RATE: 18 BRPM | WEIGHT: 293 LBS | HEART RATE: 84 BPM | DIASTOLIC BLOOD PRESSURE: 82 MMHG | BODY MASS INDEX: 51.91 KG/M2 | HEIGHT: 63 IN | SYSTOLIC BLOOD PRESSURE: 132 MMHG

## 2024-03-06 DIAGNOSIS — G89.29 CHRONIC PAIN OF RIGHT ANKLE: Primary | ICD-10-CM

## 2024-03-06 DIAGNOSIS — M25.571 CHRONIC PAIN OF RIGHT ANKLE: Primary | ICD-10-CM

## 2024-03-06 PROCEDURE — 99999 PR PBB SHADOW E&M-EST. PATIENT-LVL III: CPT | Mod: PBBFAC,,, | Performed by: PHYSICIAN ASSISTANT

## 2024-03-06 PROCEDURE — 3008F BODY MASS INDEX DOCD: CPT | Mod: CPTII,,, | Performed by: PHYSICIAN ASSISTANT

## 2024-03-06 PROCEDURE — 3079F DIAST BP 80-89 MM HG: CPT | Mod: CPTII,,, | Performed by: PHYSICIAN ASSISTANT

## 2024-03-06 PROCEDURE — 99213 OFFICE O/P EST LOW 20 MIN: CPT | Mod: S$PBB,,, | Performed by: PHYSICIAN ASSISTANT

## 2024-03-06 PROCEDURE — 99213 OFFICE O/P EST LOW 20 MIN: CPT | Mod: PBBFAC | Performed by: PHYSICIAN ASSISTANT

## 2024-03-06 PROCEDURE — 1159F MED LIST DOCD IN RCRD: CPT | Mod: CPTII,,, | Performed by: PHYSICIAN ASSISTANT

## 2024-03-06 PROCEDURE — 3075F SYST BP GE 130 - 139MM HG: CPT | Mod: CPTII,,, | Performed by: PHYSICIAN ASSISTANT

## 2024-03-06 PROCEDURE — 3044F HG A1C LEVEL LT 7.0%: CPT | Mod: CPTII,,, | Performed by: PHYSICIAN ASSISTANT

## 2024-03-06 PROCEDURE — 1160F RVW MEDS BY RX/DR IN RCRD: CPT | Mod: CPTII,,, | Performed by: PHYSICIAN ASSISTANT

## 2024-03-06 RX ORDER — MELOXICAM 15 MG/1
15 TABLET ORAL DAILY
Qty: 30 TABLET | Refills: 0 | Status: SHIPPED | OUTPATIENT
Start: 2024-03-06

## 2024-03-06 NOTE — PROGRESS NOTES
Subjective:      Patient ID: Julieth Randhawa is a 28 y.o. female.    Chief Complaint: Injury and Pain of the Right Ankle    Review of patient's allergies indicates:  No Known Allergies   29 yo F presents to clinic with c/o right ankle pain x few years.  She reports history of right ankle fracture a few years ago, says that she was told that she needed surgery but didn't want to have surgery.  Pain has continued since then.  Today she c/o diffuse achy/sharp ankle pain that now radiates up leg into knee and hip.  Worse with walking/weight bearing and improves some with rest.  Swelling but no redness or warmth.  No numbness/tingling.  She says that she purchased a boot and has been wearing with ambulation which does help some.  Today she presents to clinic in [facility].  She says that she has tried home exercises in the past with little improvement of symptoms.        Review of Systems   Constitutional: Negative for chills, diaphoresis and fever.   HENT:  Negative for congestion, ear discharge and ear pain.    Eyes:  Negative for blurred vision, discharge, double vision and pain.   Cardiovascular:  Negative for chest pain, claudication and cyanosis.   Respiratory:  Negative for cough, hemoptysis and shortness of breath.    Endocrine: Negative for cold intolerance and heat intolerance.   Skin:  Negative for color change, dry skin, itching and rash.   Musculoskeletal:  Positive for joint pain. Negative for arthritis, back pain, falls, gout, joint swelling, muscle weakness and neck pain.   Gastrointestinal:  Negative for abdominal pain and change in bowel habit.   Neurological:  Negative for brief paralysis, disturbances in coordination, dizziness, numbness and paresthesias.   Psychiatric/Behavioral:  Negative for altered mental status and depression.          Objective:          General    Constitutional: She is oriented to person, place, and time. She appears well-developed and well-nourished. No distress.   HENT:   Head:  Atraumatic.   Eyes: EOM are normal. Right eye exhibits no discharge. Left eye exhibits no discharge.   Cardiovascular:  Normal rate.            Pulmonary/Chest: Effort normal. No respiratory distress.   Abdominal: Soft.   Neurological: She is alert and oriented to person, place, and time.   Psychiatric: She has a normal mood and affect. Her behavior is normal.         Right Ankle/Foot Exam     Inspection   Deformity: absent  Erythema: absent  Bruising: Ankle - absent Foot - absent    Range of Motion   Ankle Joint   Dorsiflexion:  normal   Plantar flexion:  normal   Subtalar Joint   Inversion:  abnormal   Eversion:  abnormal   Butts Test:  negative  First MTP Joint: normal    Other   Sensation: normal    Comments:  Swelling and tenderness to lateral ankle  No erythema or warmth    Left Ankle/Foot Exam     Inspection  Deformity: absent  Erythema: absent  Bruising: Ankle - absent Foot - absent    Range of Motion   The patient has normal left ankle ROM.   Butts Test:  normal  First MTP Joint: normal    Other   Sensation: normal      Vascular Exam       Edema  Right Lower Leg: absent  Left Lower Leg: absent              Assessment:         Xray Right Ankle 1/23/24:  The ankle mortise appears slightly widened. Underlying ligamentous instability cannot be excluded. The talar dome is intact. No fracture or dislocation is identified. No evidence of lytic or blastic lesions.Joint spaces are unremarkable.Soft tissues are unremarkable.       Encounter Diagnosis   Name Primary?    Chronic pain of right ankle Yes    Chronic pain of right ankle  -     MRI Ankle Without Contrast Right; Future; Expected date: 03/06/2024  -     meloxicam (MOBIC) 15 MG tablet; Take 1 tablet (15 mg total) by mouth once daily.  Dispense: 30 tablet; Refill: 0               Plan:         I made the decision to obtain old records of the patient including previous notes and imaging. New imaging was ordered today of the extremity or extremities  evaluated.     We discussed diagnosis and treatment options. Pt would like to proceed with MRI and prescription antiinflammatory. She declines formal PT or any other treatment at this time.    1. Mobic 15 mg 1 time daily PRN for pain management. Stop any other NSAIDs.    2. Ice compress to the affected area 2-3x a day for 15-20 minutes as needed for pain management.  3.  CAM boot during ambulation  4. RTC after MRI for results and follow up, sooner if needed.      Patient voices understanding of and agreement with treatment plan. All of the patient's questions were answered and the patient will contact us if she has any questions or concerns in the interim.

## 2024-03-12 NOTE — PROGRESS NOTES
Subjective:      Patient ID: Julieth Randhawa is a 28 y.o. female.    Chief Complaint: Pain of the Right Ankle    Review of patient's allergies indicates:  No Known Allergies   Pt returns to clinic for follow up of right ankle pain and MRI results.  No change in symptoms since last visit.    3/6/24:  27 yo F presents to clinic with c/o right ankle pain x few years.  She reports history of right ankle fracture a few years ago, says that she was told that she needed surgery but didn't want to have surgery.  Pain has continued since then.  Today she c/o diffuse achy/sharp ankle pain that now radiates up leg into knee and hip.  Worse with walking/weight bearing and improves some with rest.  Swelling but no redness or warmth.  No numbness/tingling.  She says that she purchased a boot and has been wearing with ambulation which does help some.  Today she presents to clinic in [facility].  She says that she has tried home exercises in the past with little improvement of symptoms.        Review of Systems   Constitutional: Negative for chills, diaphoresis and fever.   HENT:  Negative for congestion, ear discharge and ear pain.    Eyes:  Negative for blurred vision, discharge, double vision and pain.   Cardiovascular:  Negative for chest pain, claudication and cyanosis.   Respiratory:  Negative for cough, hemoptysis and shortness of breath.    Endocrine: Negative for cold intolerance and heat intolerance.   Skin:  Negative for color change, dry skin, itching and rash.   Musculoskeletal:  Positive for joint pain. Negative for arthritis, back pain, falls, gout, joint swelling, muscle weakness and neck pain.   Gastrointestinal:  Negative for abdominal pain and change in bowel habit.   Neurological:  Negative for brief paralysis, disturbances in coordination, dizziness, numbness and paresthesias.   Psychiatric/Behavioral:  Negative for altered mental status and depression.          Objective:          General    Constitutional: She is  oriented to person, place, and time. She appears well-developed and well-nourished. No distress.   HENT:   Head: Atraumatic.   Eyes: EOM are normal. Right eye exhibits no discharge. Left eye exhibits no discharge.   Cardiovascular:  Normal rate.            Pulmonary/Chest: Effort normal. No respiratory distress.   Abdominal: Soft.   Neurological: She is alert and oriented to person, place, and time.   Psychiatric: She has a normal mood and affect. Her behavior is normal.         Right Ankle/Foot Exam     Inspection   Deformity: absent  Erythema: absent  Bruising: Ankle - absent Foot - absent    Range of Motion   Ankle Joint   Dorsiflexion:  normal   Plantar flexion:  normal   Subtalar Joint   Inversion:  abnormal   Eversion:  abnormal   Butts Test:  negative  First MTP Joint: normal    Other   Sensation: normal    Comments:  Swelling and tenderness to lateral ankle  No erythema or warmth    Left Ankle/Foot Exam     Inspection  Deformity: absent  Erythema: absent  Bruising: Ankle - absent Foot - absent    Range of Motion   The patient has normal left ankle ROM.   Butts Test:  normal  First MTP Joint: normal    Other   Sensation: normal      Vascular Exam       Edema  Right Lower Leg: absent  Left Lower Leg: absent              Assessment:         MRI Right Ankle 3//13/24:  1. Osteochondral lesion along the talar dome.  2. Minimal degenerative change of the tibiotalar joint with effusion.  3. Findings raise the possibility for sinus tarsi syndrome, in the appropriate clinical setting.  4. Ganglion cyst medial hindfoot.    Xray Right Ankle 1/23/24:  The ankle mortise appears slightly widened. Underlying ligamentous instability cannot be excluded. The talar dome is intact. No fracture or dislocation is identified. No evidence of lytic or blastic lesions.Joint spaces are unremarkable.Soft tissues are unremarkable.       Encounter Diagnosis   Name Primary?    Chronic pain of right ankle Yes      Chronic pain of  right ankle  -     Ambulatory referral/consult to Podiatry; Future; Expected date: 03/21/2024                 Plan:         I made the decision to obtain old records of the patient including previous notes and imaging. New imaging was ordered today of the extremity or extremities evaluated.     We discussed diagnosis and treatment options. Pt would like referral to podiatry.    Referral to podiatry.  2. Mobic 15 mg 1 time daily PRN for pain management. Stop any other NSAIDs.  3. Ice compress to the affected area 2-3x a day for 15-20 minutes as needed for pain management.  4.  CAM boot during ambulation  5. RTC after MRI for results and follow up, sooner if needed.      Patient voices understanding of and agreement with treatment plan. All of the patient's questions were answered and the patient will contact us if she has any questions or concerns in the interim.

## 2024-03-13 ENCOUNTER — HOSPITAL ENCOUNTER (OUTPATIENT)
Dept: RADIOLOGY | Facility: HOSPITAL | Age: 29
Discharge: HOME | End: 2024-03-13
Attending: PHYSICIAN ASSISTANT
Payer: MEDICAID

## 2024-03-13 DIAGNOSIS — M25.571 CHRONIC PAIN OF RIGHT ANKLE: ICD-10-CM

## 2024-03-13 DIAGNOSIS — G89.29 CHRONIC PAIN OF RIGHT ANKLE: ICD-10-CM

## 2024-03-13 PROCEDURE — 73721 MRI JNT OF LWR EXTRE W/O DYE: CPT | Mod: TC,RT

## 2024-03-13 PROCEDURE — 73721 MRI JNT OF LWR EXTRE W/O DYE: CPT | Mod: 26,RT,, | Performed by: RADIOLOGY

## 2024-03-14 ENCOUNTER — OFFICE VISIT (OUTPATIENT)
Dept: ORTHOPEDICS | Facility: CLINIC | Age: 29
End: 2024-03-14
Payer: MEDICAID

## 2024-03-14 VITALS
HEIGHT: 63 IN | WEIGHT: 293 LBS | SYSTOLIC BLOOD PRESSURE: 128 MMHG | BODY MASS INDEX: 51.91 KG/M2 | HEART RATE: 80 BPM | RESPIRATION RATE: 18 BRPM | DIASTOLIC BLOOD PRESSURE: 80 MMHG | OXYGEN SATURATION: 99 %

## 2024-03-14 DIAGNOSIS — G89.29 CHRONIC PAIN OF RIGHT ANKLE: Primary | ICD-10-CM

## 2024-03-14 DIAGNOSIS — M25.571 CHRONIC PAIN OF RIGHT ANKLE: Primary | ICD-10-CM

## 2024-03-14 PROCEDURE — 1160F RVW MEDS BY RX/DR IN RCRD: CPT | Mod: CPTII,,, | Performed by: PHYSICIAN ASSISTANT

## 2024-03-14 PROCEDURE — 3044F HG A1C LEVEL LT 7.0%: CPT | Mod: CPTII,,, | Performed by: PHYSICIAN ASSISTANT

## 2024-03-14 PROCEDURE — 3079F DIAST BP 80-89 MM HG: CPT | Mod: CPTII,,, | Performed by: PHYSICIAN ASSISTANT

## 2024-03-14 PROCEDURE — 99213 OFFICE O/P EST LOW 20 MIN: CPT | Mod: S$PBB,,, | Performed by: PHYSICIAN ASSISTANT

## 2024-03-14 PROCEDURE — 1159F MED LIST DOCD IN RCRD: CPT | Mod: CPTII,,, | Performed by: PHYSICIAN ASSISTANT

## 2024-03-14 PROCEDURE — 99999 PR PBB SHADOW E&M-EST. PATIENT-LVL III: CPT | Mod: PBBFAC,,, | Performed by: PHYSICIAN ASSISTANT

## 2024-03-14 PROCEDURE — 99213 OFFICE O/P EST LOW 20 MIN: CPT | Mod: PBBFAC | Performed by: PHYSICIAN ASSISTANT

## 2024-03-14 PROCEDURE — 3074F SYST BP LT 130 MM HG: CPT | Mod: CPTII,,, | Performed by: PHYSICIAN ASSISTANT

## 2024-03-14 PROCEDURE — 3008F BODY MASS INDEX DOCD: CPT | Mod: CPTII,,, | Performed by: PHYSICIAN ASSISTANT

## 2024-04-08 ENCOUNTER — PATIENT OUTREACH (OUTPATIENT)
Dept: ADMINISTRATIVE | Facility: HOSPITAL | Age: 29
End: 2024-04-08
Payer: MEDICAID

## 2024-04-08 NOTE — PROGRESS NOTES
Chart reviewed, immunization record updated.  No new results noted on [facility] or [facility] web site.  Care Everywhere updated.   Patient care coordination note updated.   Upcoming PCP visit updated.  Next PCP visit Not scheduled.  LOV with PCP 06/03/2022  Contacted patient to discuss scheduling Cervical Cancer Screening and scheduling PCP follow up appointment.  Patient states she will call clinic when ready to schedule.

## 2024-05-01 ENCOUNTER — OFFICE VISIT (OUTPATIENT)
Dept: PODIATRY | Facility: CLINIC | Age: 29
End: 2024-05-01
Payer: MEDICAID

## 2024-05-01 VITALS — BODY MASS INDEX: 51.91 KG/M2 | HEIGHT: 63 IN | WEIGHT: 293 LBS

## 2024-05-01 DIAGNOSIS — M65.9 SYNOVITIS OF RIGHT ANKLE: ICD-10-CM

## 2024-05-01 DIAGNOSIS — G89.29 CHRONIC PAIN OF RIGHT ANKLE: Primary | ICD-10-CM

## 2024-05-01 DIAGNOSIS — M25.571 CHRONIC PAIN OF RIGHT ANKLE: Primary | ICD-10-CM

## 2024-05-01 DIAGNOSIS — M25.571 RIGHT ANKLE PAIN, UNSPECIFIED CHRONICITY: Primary | ICD-10-CM

## 2024-05-01 DIAGNOSIS — M94.9 OSTEOCHONDRAL LESION OF TALAR DOME: ICD-10-CM

## 2024-05-01 DIAGNOSIS — M89.9 OSTEOCHONDRAL LESION OF TALAR DOME: ICD-10-CM

## 2024-05-01 PROBLEM — Z47.89 ENCOUNTER FOR CRUTCH TRAINING: Status: ACTIVE | Noted: 2024-05-01

## 2024-05-01 PROBLEM — Z47.89 ENCOUNTER FOR CRUTCH TRAINING: Status: RESOLVED | Noted: 2024-05-01 | Resolved: 2024-05-01

## 2024-05-01 PROCEDURE — 99999 PR PBB SHADOW E&M-EST. PATIENT-LVL III: CPT | Mod: PBBFAC,,, | Performed by: PODIATRIST

## 2024-05-01 PROCEDURE — 99999PBSHW PR PBB SHADOW TECHNICAL ONLY FILED TO HB: Mod: PBBFAC,,,

## 2024-05-01 PROCEDURE — 3044F HG A1C LEVEL LT 7.0%: CPT | Mod: CPTII,,, | Performed by: PODIATRIST

## 2024-05-01 PROCEDURE — 20605 DRAIN/INJ JOINT/BURSA W/O US: CPT | Mod: PBBFAC,PN,RT | Performed by: PODIATRIST

## 2024-05-01 PROCEDURE — 99213 OFFICE O/P EST LOW 20 MIN: CPT | Mod: PBBFAC,PN | Performed by: PODIATRIST

## 2024-05-01 PROCEDURE — 3008F BODY MASS INDEX DOCD: CPT | Mod: CPTII,,, | Performed by: PODIATRIST

## 2024-05-01 PROCEDURE — 99203 OFFICE O/P NEW LOW 30 MIN: CPT | Mod: 25,S$PBB,, | Performed by: PODIATRIST

## 2024-05-01 PROCEDURE — 1159F MED LIST DOCD IN RCRD: CPT | Mod: CPTII,,, | Performed by: PODIATRIST

## 2024-05-01 RX ORDER — ASPIRIN 325 MG
325 TABLET ORAL 2 TIMES DAILY
Qty: 120 TABLET | Refills: 0 | Status: SHIPPED | OUTPATIENT
Start: 2024-05-01 | End: 2024-06-30

## 2024-05-01 RX ORDER — DEXAMETHASONE SODIUM PHOSPHATE 4 MG/ML
4 INJECTION, SOLUTION INTRA-ARTICULAR; INTRALESIONAL; INTRAMUSCULAR; INTRAVENOUS; SOFT TISSUE
Status: DISCONTINUED | OUTPATIENT
Start: 2024-05-01 | End: 2024-05-01 | Stop reason: HOSPADM

## 2024-05-01 RX ADMIN — DEXAMETHASONE SODIUM PHOSPHATE 4 MG: 4 INJECTION, SOLUTION INTRAMUSCULAR; INTRAVENOUS at 09:05

## 2024-05-01 NOTE — PATIENT INSTRUCTIONS
Talar Dome Lesion        What Is a Talar Dome Lesion?     The ankle joint is composed of the bottom of the tibia (shin) bone and the top of the talus (ankle) bone. The top of the talus is dome-shaped and is completely covered with cartilage--a tough, rubbery tissue that enables the ankle to move smoothly. A talar dome lesion is an injury to the cartilage and underlying bone of the talus within the ankle joint. It is also called an osteochondral defect (OCD) or osteochondral lesion of the talus (OLT). Osteo means bone and chondral refers to cartilage.    Talar dome lesions are usually caused by an injury, such as an ankle sprain. If the cartilage does not heal properly following the injury, it softens and begins to break off. Sometimes a broken piece of the damaged cartilage and bone will float in the ankle.    Signs & Symptoms  Unless the injury is extensive, it may take months, a year or even longer for symptoms to develop. The signs and symptoms of a talar dome lesion may include:    Chronic pain deep in the ankle--typically worse when bearing weight on the foot (especially during sports) and less when resting  An occasional clicking or catching feeling in the ankle when walking  A sensation of the ankle locking or giving out  Episodes of swelling of the ankle--occurring when bearing weight and subsiding when at rest     Diagnosis  A talar dome lesion can be difficult to diagnose because the precise site of the pain can be hard to pinpoint. To diagnose this injury, the foot and ankle surgeon will question the patient about recent or previous injury and will examine the foot and ankle, moving the ankle joint to help determine if there is pain, clicking or limited motion within that joint.    Sometimes the surgeon will inject the joint with an anesthetic (pain-relieving medication) to see if the pain goes away for a while, indicating that the pain is coming from inside the joint. X-rays are taken, and  often an MRI or other advanced imaging tests are ordered to further evaluate the lesion and extent of the injury.    Nonsurgical Treatment Approaches  Treatment depends on the severity of the talar dome lesion. If the lesion is stable (without loose pieces of cartilage or bone), one or more of the following nonsurgical treatment options may be considered:    Immobilization. Depending on the type of injury, the leg may be placed in a cast or cast boot to protect the talus. During this period of immobilization, nonweightbearing range-of-motion exercises may be recommended.  Oral medications. Nonsteroidal anti-inflammatory drugs (NSAIDs), such as ibuprofen, may be helpful in reducing the pain and inflammation.  Physical therapy. Range-of-motion and strengthening exercises are beneficial once the lesion is adequately healed. Physical therapy may also include techniques to reduce pain and swelling.  Ankle brace. Wearing an ankle brace may help protect the patient from reinjury if the ankle is unstable.     When Is Surgery Needed?  If nonsurgical treatment fails to relieve the symptoms of talar dome lesions, surgery may be necessary. Surgery may involve removal of the loose bone and cartilage fragments within the joint and establishing an environment for healing. A variety of surgical techniques is available to accomplish this. The surgeon will select the best procedure based on the specific case.    Complications of Talar Dome Lesions  Depending on the amount of damage to the cartilage in the ankle joint, arthritis may develop in the joint, resulting in chronic pain, swelling and limited joint motion. Treatment for these complications is best directed by a foot and ankle surgeon and may include one or more of the following:    Nonsteroidal or steroidal anti-inflammatory medications  Physical therapy  Bracing  Surgical intervention      Joint Pain in the Foot  The foot contains 26 bones and more than 30 joints. Many  people experience pain involving one or more of these joints. The pain may be accompanied by swelling, tenderness, stiffness, redness, bruising and/or increased warmth over the affected joints.    Joint pain may be caused by trauma, infection, inflammation, arthritis, bursitis, gout or structural foot problems. It is initially treated with rest, elevation and limitation of walking/weightbearing on the painful foot. Use of nonsteroidal anti-inflammatory drugs (NSAIDs), such as ibuprofen, and ice can help reduce local inflammation and pain. Custom orothotic devices may also be prescribed to support the foot and reduce pain. A foot and ankle surgeon can best determine the cause of joint pain and recommend the appropriate treatment.      Joint Swelling in the Foot  The foot contains 26 bones and more than 30 joints. The body's natural response to any type of joint injury is to increase blood flow to the affected area. This results in an accumulation of fluid in the tissues in and around the joint, resulting in swelling. Depending on the cause of the injury, joint swelling may be accompanied by stiffness, redness, warmth and pain.    Joint swelling and pain can also be referred to as capsulitis, bursitis or synovitis.    Joint swelling may also result from inflammatory, degenerative, traumatic, infective or crystal-forming joint diseases, such as gout. If joint swelling persists, a foot and ankle surgeon can best determine the cause and recommend the appropriate treatment.    Synovitis  Synovitis is an inflammation of the tissues that line a joint. It is commonly associated with specific diseases, such as arthritis or gout, but it may also be the result of overuse or trauma. Symptoms of synovitis may include redness, swelling, warmth and pain with joint motion.    Evaluation by a foot and ankle surgeon will help confirm the diagnosis and will help rule out other possible concerns, such as fractures or infections. The  surgeon may sample fluid from the joint to analyze for inflammatory cells or may order x-rays or other advanced imaging tests to better evaluate the affected joint.    Treatment for synovitis includes rest, ice, immobilization and oral nonsteroidal anti-inflammatory drugs (NSAIDs), such as ibuprofen, and may include steroid injections into the joint. Surgery may be indicated in longstanding cases.      What Is Capsulitis    Ligaments surrounding the joint form a capsule, which helps the joint to function properly. Capsulitis is a condition in which these ligaments have become inflamed. Capsulitis can also occur in the most any joint in the foot and ankle. This inflammation causes considerable discomfort and, if left untreated, can eventually lead to a weakening of surrounding ligaments that can cause dislocation.     Symptoms  Because capsulitis can be a progressive disorder and usually worsens if left untreated, early recognition and treatment are important. In the earlier stages--the best time to seek treatment--the symptoms may include:    Pain  Swelling in the area of pain  Difficulty wearing shoes  Pain when walking barefoot     In more advanced stages, the supportive ligaments weaken, leading to failure of the joint to stabilize. The unstable joint can lead to other problems, such as repetitive injury to the cartilage of a joint.    Diagnosis  In arriving at a diagnosis, the foot and ankle surgeon will examine the foot, press on it and maneuver it to reproduce the symptoms. The surgeon will also look for potential causes and test the stability of the joint. X-rays are usually ordered, and other imaging studies are sometimes needed.    Nonsurgical Treatment  The best time to treat capsulitis is during the early stages. At that time, nonsurgical approaches can be used to stabilize the joint, reduce the symptoms and address the underlying cause of the condition.    The foot and ankle surgeon may select one or  more of the following options for early treatment of capsulitis:    Rest and ice. Staying off the foot and applying ice packs help reduce the swelling and pain. Apply an ice pack, placing a thin towel between the ice and the skin. Use ice for 20 minutes and then wait at least 40 minutes before icing again.  Oral medications. Nonsteroidal anti-inflammatory drugs (NSAIDs), such as ibuprofen, may help relieve the pain and inflammation.  Taping/splinting. It may be necessary to tape the toe so that it will stay in the correct position. This helps relieve the pain and prevent further drifting of the toe.  Stretching. Stretching exercises may be prescribed for patients who have tight calf muscles.  Shoe modifications. Supportive shoes with stiff soles are recommended because they control the motion and lessen the amount of pressure on the ball of the foot.  Orthotic devices. Custom shoe inserts are often very beneficial. These include arch supports or a metatarsal pad that distributes the weight away from the joint.     When Is Surgery Needed?  Once the second toe starts moving toward the big toe, it will never go back to its normal position unless surgery is performed. The foot and ankle surgeon will select the procedure or combination of procedures best suited to the individual patient.    Nonsurgical Treatment  When you have joint inflammation or swelling, rehabilitation is crucial--and it starts the moment your treatment begins. Your foot and ankle surgeon may recommend one or more of the following treatment options:      -Rest. Stay off the injured ankle. Walking may cause further injury.  -Ice. Apply an ice pack to the injured area, placing a thin towel between the ice and the skin. Use ice for 20 minutes and then wait at least 40 minutes before icing again.  -Compression. An elastic wrap may be recommended to control swelling.  -Elevation. The ankle should be raised slightly above the level of your heart to reduce  swelling.  -Early physical therapy. Your doctor will start you on a rehabilitation program as soon as possible to promote healing and increase your range of motion. This includes doing prescribed exercises.  -Medications. Nonsteroidal anti-inflammatory drugs (NSAIDs), such as ibuprofen, may be recommended to reduce pain and inflammation. In some cases, prescription pain medications are needed to provide adequate relief.  -Immobilization. A period of limited or no weight bearing on the injured extremity.     When Is Surgery Needed?  In more severe cases, surgery may be required to adequately treat an injury to the foot or ankle. Surgery often involves repairing the damaged structures, ligament or ligaments. The foot and ankle surgeon will select the surgical procedure best suited for your case based on the type and severity of your injury as well as your activity level.    After surgery, rehabilitation is extremely important. Completing your rehabilitation program is crucial to a successful outcome. Be sure to continue to see your foot and ankle surgeon during this period to ensure that your ankle heals properly and function is restored.      R.I.C.E. Protocol      R.I.C.E. stands for Rest, Ice, Compression, and Elevation. Doing these things helps limit pain and swelling after an injury. R.I.C.E. also helps injuries heal faster. Use R.I.C.E. for sprains, strains, and severe bruises, joint pain/swelling, or foot/ankle inflammation. Follow the tips on this handout and begin R.I.C.E. as soon as possible after an injury.  []   Rest  Pain is your body's way of telling you to rest an injured area. Whether you have hurt an elbow, hand, foot, or knee, limiting its use will prevent further injury and help you heal.  []   Ice  Applying ice right after an injury helps prevent swelling and reduce pain. Don't place ice directly on your skin.  Wrap a cold pack or bag of ice in a thin cloth. Place it over the injured area.  Ice for  10 minutes every 3 hours. Don't ice for more than 20 minutes at a time.  []   Compression  Putting pressure (compression) on an injury helps prevent swelling and provides support.  Wrap the injured area firmly with an elastic bandage. If your hand or foot tingles, becomes discolored, or feels cold to the touch, the bandage may be too tight. Rewrap it more loosely.  If your bandage becomes too loose, rewrap it.  Do not wear an elastic bandage overnight.  []   Elevation  Keeping an injury elevated helps reduce swelling, pain, and throbbing. Elevation is most effective when the injury is kept elevated higher than the heart.     Call your healthcare provider if you notice any of the following:  Fingers or toes feel numb, are cold to the touch, or change color  Skin looks shiny or tight  Pain, swelling, or bruising worsens and is not improved with elevation

## 2024-05-01 NOTE — PROGRESS NOTES
[facility] - PODIATRY  [address] [facility]  [address]  [place] [address]  Dept: [phone]  Dept Fax: [phone]    Roosevelt Orozco Jr., DPM     Assessment:   MDM    Coding  1. Chronic pain of right ankle  Ambulatory referral/consult to Podiatry    aspirin 325 MG tablet    Ambulatory referral/consult to Physical/Occupational Therapy      2. 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 Synovitis of right ankle  aspirin 325 MG tablet    Ambulatory referral/consult to Physical/Occupational Therapy    Intermediate Joint Aspiration/Injection          Plan:     Intermediate Joint Aspiration/Injection: R ankle    Date/Time: 5/1/2024 9:00 AM    Performed by: Roosevelt Orozco Jr., DPM  Authorized by: Roosevelt Orozco Jr., DPM    Consent Done?:  Yes (Verbal)  Indications:  Pain and joint swelling  Site marked: The procedure site was marked    Timeout: Prior to procedure the correct patient, procedure, and site was verified      Location:  Ankle  Site:  R ankle  Prep: Patient was prepped and draped in usual sterile fashion    Ultrasonic Guidance for needle placement: No  Needle size:  25 G  Medications:  4 mg dexAMETHasone 4 mg/mL  Patient tolerance:  Patient tolerated the procedure well with no immediate complications      1. Chronic pain of right ankle  -     Ambulatory referral/consult to Podiatry  -     aspirin 325 MG tablet; Take 1 tablet (325 mg total) by mouth 2 (two) times a day.  Dispense: 120 tablet; Refill: 0  -     Ambulatory referral/consult to Physical/Occupational Therapy; Future; Expected date: 05/08/2024    2. 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  Dispense: 120 tablet; Refill: 0  -     Ambulatory referral/consult to  Physical/Occupational Therapy; Future; Expected date: 05/08/2024  -     Intermediate Joint Aspiration/Injection    3. Synovitis of right ankle  -     aspirin 325 MG tablet; Take 1 tablet (325 mg total) by mouth 2 (two) times a day.  Dispense: 120 tablet; Refill: 0  -     Ambulatory referral/consult to Physical/Occupational Therapy; Future; Expected date: 05/08/2024  -     Intermediate Joint Aspiration/Injection    Other orders  -     Cancel: Large Joint Aspiration/Injection  -     Cancel: Intermediate Joint Aspiration/Injection      Julieth was seen today for ankle pain.    Diagnoses and all orders for this visit:    Chronic pain of right ankle  -     Ambulatory referral/consult to Podiatry  -     aspirin 325 MG tablet; Take 1 tablet (325 mg total) by mouth 2 (two) times a day.  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  -     Ambulatory referral/consult to Physical/Occupational Therapy; Future  -     Intermediate Joint Aspiration/Injection    Synovitis of right ankle  -     aspirin 325 MG tablet; Take 1 tablet (325 mg total) by mouth 2 (two) times a day.  -     Ambulatory referral/consult to Physical/Occupational Therapy; Future  -     Intermediate Joint Aspiration/Injection    Other orders  -     Cancel: Large Joint Aspiration/Injection  -     Cancel: Intermediate Joint Aspiration/Injection        -pt seen, evaluated, and managed  -dx discussed in detail. All questions/concerns addressed  -all tx options discussed. All alternatives, risks, benefits of all txs discussed  -We discussed conservative care options possible including but not limited to shoe wear and/or padding, bracing/strapping, at home ROM, formal PT, medical therapy, injection therapy  - The utilization of NSAIDs can be considered but their benefit has to be tempered against the risk  of GI/ concerns  - A steroid injection can be undertaken.  We did discuss the potential mechanism of action of this shot.  Understanding that multiple injections at the same anatomic site do have deleterious effects on the soft tissue.  Generic risks include: steroid flare (advised to ice if necessary), skin hypo-pgimentation (which can be permanent and unsightly), elevation of blood sugar, subcutaneous atrophy (can be permanent) and infection.   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 Unclear if its recent or from remote trauma.   - CT if she fails nonop tx    -rxs dispensed: none  -referrals: PT for crutch training  -WB: NWB RLE in CAM       Follow up in about 6 weeks (around 6/12/2024).    Subjective:      Patient ID: Julieth Randhawa is a 28 y.o. female.    Chief Complaint:   Chief Complaint   Patient presents with    Ankle Pain     Right ankle       CC - foot pain: patient presents to the podiatry clinic  with complaint of  right foot pain.   She reports history of right ankle fracture a few years ago, says that she was told that she needed surgery but didn't want to have surgery.  Pain has continued since then.  Today she c/o diffuse achy/sharp ankle pain that now radiates up leg into knee and hip.  Worse with walking/weight bearing and improves some with rest.Swelling but no redness or warmth.No numbness/tingling.  Treated conservatively with Ortho PA and sent here for eval after failing conservative measures (rx nsaid and walking in CAM boot). Currently taking mobic. MRI has been obtained.   Reports ankle does feel unstable and gives way from time to time.      Ankle Pain         Last Podiatry Enc: Visit date not found  Last Enc w/ Me: Visit date not found    Outside reports reviewed: historical medical records.  Family hx: as below  Past Medical History:    Diagnosis Date    Endometriosis     Heart attack 2019    Syncope and collapse      Past Surgical History:   Procedure Laterality Date    TONSILLECTOMY       Family History   Problem Relation Name Age of Onset    Hypertension Mother       Current Outpatient Medications   Medication Sig Dispense Refill    cariprazine HCl (VRAYLAR ORAL) Take by mouth.      meloxicam (MOBIC) 15 MG tablet Take 1 tablet (15 mg total) by mouth once daily. 30 tablet 0    aspirin 325 MG tablet Take 1 tablet (325 mg total) by mouth 2 (two) times a day. 120 tablet 0    buPROPion (WELLBUTRIN XL) 150 MG TB24 tablet Take 1 tablet (150 mg total) by mouth once daily. 30 tablet 11     No current facility-administered medications for this visit.     Review of patient's allergies indicates:  No Known Allergies  Social History     Socioeconomic History    Marital status: Single   Tobacco Use    Smoking status: Never    Smokeless tobacco: Never   Substance and Sexual Activity    Alcohol use: Never    Drug use: Never    Sexual activity: Yes     Partners: Female       ROS    REVIEW OF SYSTEMS: Negative as documented below as well as positive findings in bold.       Constitutional  Respiratory  Gastrointestinal  Skin   - Fever - Cough - Heartburn - Rash   - Chills - Spit blood - Nausea - Itching   - Weight Loss - Shortness of breath - Vomiting - Nail pain   - Malaise/Fatigue - Wheezing - Abdominal Pain  Wound/Ulcer   - Weight Gain   - Blood in Stool  Poor wound healing       - Diarrhea          Cardiovascular  Genitourinary  Neurological  HEENT   - Chest Pain - Dysuria - Burning Sensation of feet - Headache   - Palpitations - Hematuria - Tingling / Paresthesia - Congestion   - Pain at night in legs - Flank Pain - Dizziness - Sore Throat   - Cramping   - Tremor - Blurred Vision   - Leg Swelling   - Sensory Change - Double Vision   - Dizzy when standing   - Speech Change - Eye Redness       - Focal Weakness - Dry Eyes       - Loss of Consciousness           "Endocrine  Musculoskeletal  Psychiatric   - Cold intolerance - Muscle Pain - Depression   - Heat intolerance - Neck Pain - Insomnia   - Anemia - Joint Pain - Memory Loss   -  Easy bruising, bleeding - Heel pain - Anxiety      Toe Pain        Leg/Ankle/Foot Pain         Objective:     Ht 5' 3" (1.6 m)   Wt (!) 141 kg (310 lb 15.3 oz)   BMI 55.08 kg/m²   Vitals:    05/01/24 0928   Weight: (!) 141 kg (310 lb 15.3 oz)   Height: 5' 3" (1.6 m)   PainSc:   6   PainLoc: Foot       Physical Exam    General Appearance:   Patient appears well developed, well nourished  Patient appears stated age    Psychiatric:   Patient is oriented to time, place, and person.  Patient has appropriate mood and affect    Neck:  Trachea Midline  No visible masses    Respiratory/Ears:  No distress or labored breathing.  Able to differentiate between normal talking voice and whisper.  Able to follow commands    Eyes:  Visual Acuity intact  Lids and conjunctivae normal. No discoloration noted.    Foot Exam  Physical Exam  Ortho Exam  Ortho/SPM Exam  Physical Exam  Foot/Ankle Musculoskeletal Exam    R LE exam con't:  V:  DP 2/4, PT 2/4   CRT< 3s to all digits tested   Tibial and popliteal lymph nodes are w/o abnormality   Edema: present, varicosities: present    N:  Patient displays normal ankle reflexes   SILT in SP/DP/T/Hunter/Saph distributions    Ortho: +Motor EHL/FHL/TA/GA   There is mild decreased ROM at the ankle and STJ joints: pain is present lateral ankle gutter, crepitus is not present  There is moderate pain with palpation of lateral ankle  No pain with DF/PF  Mod pain Inv/Ev  Compartments soft/compressible. No pain on passive stretch of big toe. No calf  Pain.    Derm:  skin intact, skin warm and dry, skin without ulcers or lesions, skin without induration, nails normal, no erythema and no ecchymosis    Imaging / Labs:    MRI Ankle Without Contrast Right  Order: [id number]  Status: Final result       Visible to patient: Yes (seen)     "   Next appt: None       Dx: Chronic pain of right ankle    0 Result Notes  Details    Reading Physician Reading Date Result Priority   Adalberto Schulte MD  [phone]  [phone] 3/13/2024 Routine     Narrative & Impression  EXAMINATION:  MRI ANKLE WITHOUT CONTRAST RIGHT     CLINICAL HISTORY:  Ankle pain, instability suspected, neg xray;  Pain in right ankle and joints of right foot     TECHNIQUE:  Multiplanar multi sequence MR imaging of the right ankle.     COMPARISON:  01/23/2024     FINDINGS:  Bones: No acute fracture, avascular necrosis or marrow replacement.  Plantar calcaneal spur.     Joint: There is a tibiotalar joint effusion.  No malalignment.  There is an osteochondral lesion along the lateral talar dome central 3rd.  Minimal marginal spurs of the tibiotalar joint.     Medial ankle ligaments: Deltoid and spring ligament complex are intact.     Lateral ankle ligaments: Anterior and posterior tibiofibular and talofibular ligaments are intact.  Calcaneofibular ligament is intact.     Extensor tendons: Intact.     Flexor tendons: Intact.     Peroneal tendons: Intact.     Plantar Fascia: Intact.     Sinus Tarsi: There is partial replacement of fat in the sinus tarsi with edematous soft tissue.     Tarsal Tunnel: Unremarkable.     Soft Tissues: There is a septated 1 cm focal fluid collection along the medial margin of the calcaneal body.     Impression:     1. Osteochondral lesion along the talar dome.  2. Minimal degenerative change of the tibiotalar joint with effusion.  3. Findings raise the possibility for sinus tarsi syndrome, in the appropriate clinical setting.  4. Ganglion cyst medial hindfoot.        Electronically signed by:Adalberto Schulte  Date:                                            03/13/2024  Time:                                           15:15           Exam Ended: 03/13/24 14:59 CDT Last Resulted: 03/13/24 15:15 CDT               Note: This was dictated using a computer  transcription program. Although proofread, it may contain computer transcription errors and phonetic errors. Other human proofreading errors may also exist. Corrections may be performed at a later time. Please contact us for any clarification if needed.    Roosevelt Orozco DPM  [facility]

## 2024-06-12 ENCOUNTER — OFFICE VISIT (OUTPATIENT)
Dept: PODIATRY | Facility: CLINIC | Age: 29
End: 2024-06-12
Payer: MEDICAID

## 2024-06-12 VITALS — WEIGHT: 293 LBS | BODY MASS INDEX: 51.91 KG/M2 | HEIGHT: 63 IN | RESPIRATION RATE: 18 BRPM

## 2024-06-12 DIAGNOSIS — M94.9 OSTEOCHONDRAL LESION OF TALAR DOME: Primary | ICD-10-CM

## 2024-06-12 DIAGNOSIS — M65.9 SYNOVITIS OF RIGHT ANKLE: ICD-10-CM

## 2024-06-12 DIAGNOSIS — M89.9 OSTEOCHONDRAL LESION OF TALAR DOME: Primary | ICD-10-CM

## 2024-06-12 PROCEDURE — 1160F RVW MEDS BY RX/DR IN RCRD: CPT | Mod: CPTII,,, | Performed by: PODIATRIST

## 2024-06-12 PROCEDURE — 20605 DRAIN/INJ JOINT/BURSA W/O US: CPT | Mod: PBBFAC,PN | Performed by: PODIATRIST

## 2024-06-12 PROCEDURE — 99999 PR PBB SHADOW E&M-EST. PATIENT-LVL III: CPT | Mod: PBBFAC,,, | Performed by: PODIATRIST

## 2024-06-12 PROCEDURE — 99213 OFFICE O/P EST LOW 20 MIN: CPT | Mod: PBBFAC,PN,25 | Performed by: PODIATRIST

## 2024-06-12 PROCEDURE — 99999PBSHW PR PBB SHADOW TECHNICAL ONLY FILED TO HB: Mod: PBBFAC,,,

## 2024-06-12 PROCEDURE — 1159F MED LIST DOCD IN RCRD: CPT | Mod: CPTII,,, | Performed by: PODIATRIST

## 2024-06-12 PROCEDURE — 99213 OFFICE O/P EST LOW 20 MIN: CPT | Mod: 25,S$PBB,, | Performed by: PODIATRIST

## 2024-06-12 PROCEDURE — 3044F HG A1C LEVEL LT 7.0%: CPT | Mod: CPTII,,, | Performed by: PODIATRIST

## 2024-06-12 RX ORDER — TRIAMCINOLONE ACETONIDE 40 MG/ML
40 INJECTION, SUSPENSION INTRA-ARTICULAR; INTRAMUSCULAR
Status: DISCONTINUED | OUTPATIENT
Start: 2024-06-12 | End: 2024-06-12 | Stop reason: HOSPADM

## 2024-06-12 RX ADMIN — TRIAMCINOLONE ACETONIDE 40 MG: 40 INJECTION, SUSPENSION INTRA-ARTICULAR; INTRAMUSCULAR at 11:06

## 2024-06-12 NOTE — PROGRESS NOTES
[facility] - PODIATRY  [address]  [address]  [place] [address]  Dept: [phone]  Dept Fax: [phone]    Roosevelt Orozco Jr., DPM     Assessment:   MDM    Coding  1. 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 Synovitis of right ankle  Intermediate Joint Aspiration/Injection    Ambulatory referral/consult to Physical/Occupational Therapy          Plan:     Intermediate Joint Aspiration/Injection: R ankle    Date/Time: 6/12/2024 11:00 AM    Performed by: Roosevelt Orozoc Jr., DPM  Authorized by: Roosevelt Orozco Jr., DPM    Consent Done?:  Yes (Verbal)  Indications:  Pain and joint swelling  Site marked: The procedure site was marked    Timeout: Prior to procedure the correct patient, procedure, and site was verified      Location:  Ankle  Site:  R ankle  Prep: Patient was prepped and draped in usual sterile fashion    Ultrasonic Guidance for needle placement: No  Needle size:  25 G  Approach:  Anteromedial  Medications:  40 mg triamcinolone acetonide 40 mg/mL  Patient tolerance:  Patient tolerated the procedure well with no immediate complications      1. 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 Synovitis of right ankle  -     Intermediate Joint Aspiration/Injection  -     Ambulatory referral/consult to Physical/Occupational Therapy; Future; Expected date: 06/19/2024      Julieth was seen today for ankle pain.    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 All questions/concerns addressed  -all tx options discussed. All alternatives, risks, benefits of all txs discussed  -We discussed conservative care options possible including but not limited to shoe wear and/or padding, bracing/strapping, at home ROM, formal PT, medical therapy, injection therapy  - The utilization of NSAIDs can be considered but their benefit has to be tempered against the risk of GI/ concerns  - A steroid injection can be undertaken.  We did discuss the potential mechanism of action of this shot.  Understanding that multiple injections at the same anatomic site do have deleterious effects on the soft tissue.  Generic risks include: steroid flare (advised to ice if necessary), skin hypo-pgimentation (which can be permanent and unsightly), elevation of blood sugar, subcutaneous atrophy (can be permanent) and infection.   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 Unclear if its recent or from remote trauma.   - CT if she fails nonop tx    -rxs dispensed: none  -referrals: PT for rehab  -WB: wbat in CAM x 2 wks then wbat in ASO x 2wks then wean from ASO into normal shoes      Follow up in about 6 weeks (around 7/24/2024).    Subjective:      Patient ID: Julieth Randhawa is a 29 y.o. female.    Chief Complaint:   Chief Complaint   Patient presents with    Ankle Pain     RIGHT        CC - foot pain: patient presents to the podiatry clinic  with complaint of  right foot pain.   She reports history of right ankle fracture a few years ago, says that she was told that she needed surgery but didn't want to have surgery.  Pain has continued since then.  Today she c/o diffuse achy/sharp ankle pain that now  radiates up leg into knee and hip.  Worse with walking/weight bearing and improves some with rest.Swelling but no redness or warmth.No numbness/tingling.  Treated conservatively with Ortho PA and sent here for eval after failing conservative measures (rx nsaid and walking in CAM boot). Currently taking mobic. MRI has been obtained.   Reports ankle does feel unstable and gives way from time to time.      6/12/24:  Hx as above. F/u for R talar OCD. Been in CAM boot since last visit. Still in some pain.    Ankle Pain         Last Podiatry Enc: Visit date not found  Last Enc w/ Me: Visit date not found    Outside reports reviewed: historical medical records.  Family hx: as below  Past Medical History:   Diagnosis Date    Endometriosis     Heart attack 2019    Syncope and collapse      Past Surgical History:   Procedure Laterality Date    TONSILLECTOMY       Family History   Problem Relation Name Age of Onset    Hypertension Mother       Current Outpatient Medications   Medication Sig Dispense Refill    aspirin 325 MG tablet Take 1 tablet (325 mg total) by mouth 2 (two) times a day. 120 tablet 0    cariprazine HCl (VRAYLAR ORAL) Take by mouth.      meloxicam (MOBIC) 15 MG tablet Take 1 tablet (15 mg total) by mouth once daily. 30 tablet 0    buPROPion (WELLBUTRIN XL) 150 MG TB24 tablet Take 1 tablet (150 mg total) by mouth once daily. 30 tablet 11     No current facility-administered medications for this visit.     Review of patient's allergies indicates:  No Known Allergies  Social History     Socioeconomic History    Marital status: Single   Tobacco Use    Smoking status: Never    Smokeless tobacco: Never   Substance and Sexual Activity    Alcohol use: Never    Drug use: Never    Sexual activity: Yes     Partners: Female       ROS    REVIEW OF SYSTEMS: Negative as documented below as well as positive findings in bold.       Constitutional  Respiratory  Gastrointestinal  Skin   - Fever - Cough - Heartburn - Rash   -  "Chills - Spit blood - Nausea - Itching   - Weight Loss - Shortness of breath - Vomiting - Nail pain   - Malaise/Fatigue - Wheezing - Abdominal Pain  Wound/Ulcer   - Weight Gain   - Blood in Stool  Poor wound healing       - Diarrhea          Cardiovascular  Genitourinary  Neurological  HEENT   - Chest Pain - Dysuria - Burning Sensation of feet - Headache   - Palpitations - Hematuria - Tingling / Paresthesia - Congestion   - Pain at night in legs - Flank Pain - Dizziness - Sore Throat   - Cramping   - Tremor - Blurred Vision   - Leg Swelling   - Sensory Change - Double Vision   - Dizzy when standing   - Speech Change - Eye Redness       - Focal Weakness - Dry Eyes       - Loss of Consciousness          Endocrine  Musculoskeletal  Psychiatric   - Cold intolerance - Muscle Pain - Depression   - Heat intolerance - Neck Pain - Insomnia   - Anemia - Joint Pain - Memory Loss   -  Easy bruising, bleeding - Heel pain - Anxiety      Toe Pain        Leg/Ankle/Foot Pain         Objective:     Resp 18   Ht 5' 3" (1.6 m)   Wt (!) 141 kg (310 lb 13.6 oz)   BMI 55.06 kg/m²   Vitals:    06/12/24 1106   Resp: 18   Weight: (!) 141 kg (310 lb 13.6 oz)   Height: 5' 3" (1.6 m)   PainSc:   6   PainLoc: Ankle       Physical Exam    General Appearance:   Patient appears well developed, well nourished  Patient appears stated age    Psychiatric:   Patient is oriented to time, place, and person.  Patient has appropriate mood and affect    Neck:  Trachea Midline  No visible masses    Respiratory/Ears:  No distress or labored breathing.  Able to differentiate between normal talking voice and whisper.  Able to follow commands    Eyes:  Visual Acuity intact  Lids and conjunctivae normal. No discoloration noted.    Foot Exam  Physical Exam  Ortho Exam  Ortho/SPM Exam  Physical Exam  Foot/Ankle Musculoskeletal Exam    R LE exam con't:  V:  DP 2/4, PT 2/4   CRT< 3s to all digits tested   Tibial and popliteal lymph nodes are w/o " abnormality   Edema: present, varicosities: present    N:  Patient displays normal ankle reflexes   SILT in SP/DP/T/Hunter/Saph distributions    Ortho: +Motor EHL/FHL/TA/GA   There is mild decreased ROM at the ankle and STJ joints: pain is present lateral ankle gutter, crepitus is not present  There is moderate pain with palpation of lateral ankle  No pain with DF/PF  Mod pain Inv/Ev  Compartments soft/compressible. No pain on passive stretch of big toe. No calf  Pain.    Derm:  skin intact, skin warm and dry, skin without ulcers or lesions, skin without induration, nails normal, no erythema and no ecchymosis    Imaging / Labs:    MRI Ankle Without Contrast Right  Order: [id number]  Status: Final result       Visible to patient: Yes (seen)       Next appt: None       Dx: Chronic pain of right ankle    0 Result Notes  Details    Reading Physician Reading Date Result Priority   Adalberto Schulte MD  [phone]  [phone] 3/13/2024 Routine     Narrative & Impression  EXAMINATION:  MRI ANKLE WITHOUT CONTRAST RIGHT     CLINICAL HISTORY:  Ankle pain, instability suspected, neg xray;  Pain in right ankle and joints of right foot     TECHNIQUE:  Multiplanar multi sequence MR imaging of the right ankle.     COMPARISON:  01/23/2024     FINDINGS:  Bones: No acute fracture, avascular necrosis or marrow replacement.  Plantar calcaneal spur.     Joint: There is a tibiotalar joint effusion.  No malalignment.  There is an osteochondral lesion along the lateral talar dome central 3rd.  Minimal marginal spurs of the tibiotalar joint.     Medial ankle ligaments: Deltoid and spring ligament complex are intact.     Lateral ankle ligaments: Anterior and posterior tibiofibular and talofibular ligaments are intact.  Calcaneofibular ligament is intact.     Extensor tendons: Intact.     Flexor tendons: Intact.     Peroneal tendons: Intact.     Plantar Fascia: Intact.     Sinus Tarsi: There is partial replacement of fat in the sinus  tarsi with edematous soft tissue.     Tarsal Tunnel: Unremarkable.     Soft Tissues: There is a septated 1 cm focal fluid collection along the medial margin of the calcaneal body.     Impression:     1. Osteochondral lesion along the talar dome.  2. Minimal degenerative change of the tibiotalar joint with effusion.  3. Findings raise the possibility for sinus tarsi syndrome, in the appropriate clinical setting.  4. Ganglion cyst medial hindfoot.        Electronically signed by:Adalberto Schulte  Date:                                            03/13/2024  Time:                                           15:15           Exam Ended: 03/13/24 14:59 CDT Last Resulted: 03/13/24 15:15 CDT               Note: This was dictated using a computer transcription program. Although proofread, it may contain computer transcription errors and phonetic errors. Other human proofreading errors may also exist. Corrections may be performed at a later time. Please contact us for any clarification if needed.    Roosevelt Orozco DPM  [facility]

## 2024-06-24 ENCOUNTER — CLINICAL SUPPORT (OUTPATIENT)
Dept: REHABILITATION | Facility: HOSPITAL | Age: 29
End: 2024-06-24
Attending: PODIATRIST
Payer: MEDICAID

## 2024-06-24 DIAGNOSIS — M94.9 OSTEOCHONDRAL LESION OF TALAR DOME: ICD-10-CM

## 2024-06-24 DIAGNOSIS — M65.9 SYNOVITIS OF ANKLE: Primary | ICD-10-CM

## 2024-06-24 DIAGNOSIS — M89.9 OSTEOCHONDRAL LESION OF TALAR DOME: ICD-10-CM

## 2024-06-24 DIAGNOSIS — M65.9 SYNOVITIS OF RIGHT ANKLE: ICD-10-CM

## 2024-06-24 PROBLEM — M65.979 SYNOVITIS OF ANKLE: Status: ACTIVE | Noted: 2024-06-24

## 2024-06-24 PROCEDURE — 97161 PT EVAL LOW COMPLEX 20 MIN: CPT | Mod: PN

## 2024-06-24 PROCEDURE — 97110 THERAPEUTIC EXERCISES: CPT | Mod: PN

## 2024-06-24 NOTE — PLAN OF CARE
[facility]   Physical Therapy Initial Evaluation     Date: 6/24/2024   Name: Julieth Randhawa  Clinic Number: [id number]    Therapy Diagnosis:   Encounter Diagnoses   Name Primary?    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     Synovitis of right ankle     Synovitis of ankle Yes     Physician: Roosevelt Orozco Jr., *    Physician Orders: PT Eval and Treat   Medical Diagnosis from Referral:   Diagnosis   M89.9,M94.9 (ICD-10-CM) - Osteochondral lesion of talar dome   M65.9 (ICD-10-CM) - Synovitis of right ankle     Evaluation Date: 6/24/2024  Authorization Period Expiration: 6/12/2025  Plan of Care Expiration: 7/22/2024  Progress Note Due: 7/8/2024  Visit # / Visits authorized: 1/ 1   FOTO: 1/3    Precautions: Standard    wbat in CAM x 2 wks then wbat in ASO x 2wks then wean from ASO into normal shoes  Time In: 1300  Time Out: 1340  Total Appointment Time (timed & untimed codes): 40 minutes      SUBJECTIVE     Date of onset: 7 years ago    History of current condition - Julieth reports: she had a hairline fracture in her fibula and did not have surgery after injuring her ankle about 7 years ago. Patient reports that she wore a CAM boot for 6 weeks. She does have an ASO that she also wears. Patient was working as a  prior to the foot and ankle pain. Patient had injections in her ankle which helped temporarily.     Falls: none    Imaging, MRI studies: Impression:     1. Osteochondral lesion along the talar dome.  2. Minimal degenerative change of the tibiotalar joint with effusion.  3. Findings raise the possibility for sinus tarsi syndrome, in the appropriate clinical setting.  4. Ganglion cyst medial hindfoot.    Prior Therapy: none  Social History:  lives with their spouse  Occupation: unemployed  Prior Level of Function: Independent without difficulty  Current Level of Function: Independent with difficulty and pain    Pain:  Current 8/10, worst 9/10, best 4/10   Location:  right ankles   Description: Aching and Throbbing  Aggravating Factors: Standing and Walking  Easing Factors: ice and heating pad    Patients goals: Patient to return to prior level of function without pain.     Medical History:   Past Medical History:   Diagnosis Date    Endometriosis     Heart attack 2019    Syncope and collapse        Surgical History:   Julieth Randhawa  has a past surgical history that includes Tonsillectomy.    Medications:   Juleith has a current medication list which includes the following prescription(s): aspirin, bupropion, cariprazine hcl, and meloxicam.    Allergies:   Review of patient's allergies indicates:  No Known Allergies     OBJECTIVE     ACTIVE RANGE OF MOTION    LEFT RIGHT   Dorsiflexion (gastroc) WNL 5   Plantarflexion WNL 35   Inversion WNL 25   Eversion WNL 15     LOWER EXTREMITY STRENGTH    Left Right     Ankle Dorsiflexion 4/5 4-/5   Ankle Plantarflexion (single heel raise) 1 1   Ankle Inversion 4/5 3+/5   Ankle Eversion 4/5 4-/5       DERMATOMES: Sensation: Light Touch: Intact  MYOTOMES: WNL    PALPATION:  Patient reports primary pain region along lateral aspect of right ankle.    GAIT: Julieth ambulates with no assistive device with independently.     GAIT DEVIATIONS: Julieth displays antalgic gait    Squat Mechanics: Bilateral knee valgus, anterior tibial translation, decreased depth of squat    Limitation/Restriction for FOTO Ankle Survey    Therapist reviewed FOTO scores for Julieth Randhawa on 6/24/2024.   FOTO documents entered into EPIC - see Media section.    Limitation Score: 40%       TREATMENT     Total Treatment time (time-based codes) separate from Evaluation: 25 minutes      Julieth received the treatments listed below:      therapeutic exercises to develop strength, endurance, ROM, and flexibility for 20 minutes including:  -Rockerboard Inv/Ev 2'  -1 x 10 arch lifts  -2 x 10 4 way ankle yellow theraband  -5 x 10 seconds towel stretch    manual therapy techniques:  were applied  to the:  for  minutes, including:      neuromuscular re-education activities to improve:  for  minutes. The following activities were included:      therapeutic activities to improve functional performance for   minutes, including:      gait training to improve functional mobility and safety for   minutes, including:      direct contact modalities after being cleared for contraindications:     supervised modalities after being cleared for contradictions:     hot pack for 5 minutes to right ankle.    cold pack for  minutes to .      PATIENT EDUCATION AND HOME EXERCISES     Education provided:   - HEP  - Pt/family was provided educational information, including: role of PT, goals for PT, scheduling - pt verbalized understanding. Discussed insurance limitations with pt.     Written Home Exercises Provided: yes. Exercises were reviewed and Julieth was able to demonstrate them prior to the end of the session.  Julieth demonstrated good  understanding of the education provided. See EMR under Patient Instructions for exercises provided during therapy sessions.    ASSESSMENT     Julieth is a 29 y.o. female referred to outpatient Physical Therapy with a medical diagnosis of   Diagnosis   M89.9,M94.9 (ICD-10-CM) - Osteochondral lesion of talar dome   M65.9 (ICD-10-CM) - Synovitis of right ankle   . Patient presents with decreased right ankle  ROM, decreased strength, impaired balance and antalgic gait. Patient would benefit from skilled physical therapy to address the above mentioned deficits.    Medical necessity is demonstrated by the following IMPAIRMENTS/PROBLEMS:  -Decreased function (LEFS)  -Decreased pain free ankle AROM ()  -Decreased ankle stability ()  -Decreased hip stability ()  -Decreased participation in regular exercise routine  -Increased pain (NPS)  -(+) TTP:     Intervention strategies designed to address these impairments will be instrumental in achieving the stated functional goals, including her ability to safely  perform mobility tasks. Based on the examination, the patient's rehabilitation potential to achieve functional goals is good.    Patient prognosis is Good.   Patient will benefit from skilled outpatient Physical Therapy to address the deficits stated above and in the chart below, provide patient /family education, and to maximize patientt's level of independence.     Plan of care discussed with patient: Yes  Patient's spiritual, cultural and educational needs considered and patient is agreeable to the plan of care and goals as stated below:     Anticipated Barriers for therapy: none    Medical Necessity is demonstrated by the following  History  Co-morbidities and personal factors that may impact the plan of care Co-morbidities:   none    Personal Factors:   no deficits     low   Examination  Body Structures and Functions, activity limitations and participation restrictions that may impact the plan of care Body Regions:   lower extremities    Body Systems:    gross symmetry  ROM  strength  balance         low     Clinical Presentation stable and uncomplicated low   Decision Making/ Complexity Score: low     Goals:  Short Term Goals: 2 weeks   1. Patient demonstrates independence with HEP.   2. Patient to restore right ankle ROM to WNL.    Long Term Goals: 4 weeks   1. Patient demonstrates increased balance and proprioception to WNL to improve tolerance to functional activities.   2. Patient demonstrates increased strength BLE's to 4/5 or greater to improve tolerance to functional activities.   3. Patient demonstrates improved overall function per LEFS to 28% or less.       PLAN   Plan of care Certification: 6/24/2024 to 7/23/2024.    Outpatient Physical Therapy 2 times weekly for 4 weeks to include the following interventions: Electrical Stimulation  , Manual Therapy, Moist Heat/ Ice, Neuromuscular Re-ed, Patient Education, Therapeutic Activities, and Therapeutic Exercise.     Olena Clement, PT    Pt may be seen by PTA  as part of the rehabilitation team.     Therapist: Olena Clement, PT  6/24/2024    I CERTIFY THE NEED FOR THESE SERVICES FURNISHED UNDER THIS PLAN OF TREATMENT AND WHILE UNDER MY CARE   Physician's comments:     Physician's Signature: ___________________________________________________

## 2024-07-17 ENCOUNTER — CLINICAL SUPPORT (OUTPATIENT)
Dept: REHABILITATION | Facility: HOSPITAL | Age: 29
End: 2024-07-17
Payer: MEDICAID

## 2024-07-17 DIAGNOSIS — M94.9 OSTEOCHONDRAL LESION OF TALAR DOME: Primary | ICD-10-CM

## 2024-07-17 DIAGNOSIS — M89.9 OSTEOCHONDRAL LESION OF TALAR DOME: Primary | ICD-10-CM

## 2024-07-17 DIAGNOSIS — M65.9 SYNOVITIS OF ANKLE: ICD-10-CM

## 2024-07-17 PROCEDURE — 97110 THERAPEUTIC EXERCISES: CPT | Mod: PN,CQ

## 2024-07-17 NOTE — PROGRESS NOTES
"[facility]   Physical Therapy Treatment Note      Name: Julieth Randhawa  Clinic Number: [id number]    Visit Date: 7/17/2024    Therapy Diagnosis:        Encounter Diagnoses   Name Primary?    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      Synovitis of right ankle      Synovitis of ankle Yes      Physician: Roosevelt Orozco Jr., *     Physician Orders: PT Eval and Treat   Medical Diagnosis from Referral:   Diagnosis   M89.9,M94.9 (ICD-10-CM) - Osteochondral lesion of talar dome   M65.9 (ICD-10-CM) - Synovitis of right ankle      Evaluation Date: 6/24/2024  Authorization Period Expiration: 6/12/2025  Plan of Care Expiration: 7/22/2024  Progress Note Due: 7/8/2024  Visit # / Visits authorized: 1/ 1   FOTO: 1/3     Precautions: Standard     Time In: 1119  Time Out: 1202    Total Appointment Time (timed & untimed codes): 43 minutes    PTA Visit #: 1/5       Subjective     Patient reports: RTMD 7/24/24. Pt reports she has been performing banded ankle ex's at home using RTB. She reports she is "flat footed".   She was compliant with home exercise program.  Response to previous treatment: No change  Functional change: No change    Pain: 6/10  Location: lateral right ankle    Objective      Objective Measures updated at progress report unless specified.     Treatment     Julieth received the treatments listed below:      therapeutic exercises to develop strength for 35 minutes including:  - 4 way RTB 20x's (reviewed today)  - seated towel curls 2x10  - seated heel raises 2x10  - seated toe raises 2x10  - SAQ w/ AP 2x10  - Cycle 5'    manual therapy techniques: Soft tissue Mobilization were applied to the: lateral R ankle for 8 minutes, including:  -IASTM in SL position to access lateral R ankle    neuromuscular re-education activities to improve: Balance and Proprioception for 0 minutes. The following activities were included:  Not performed today    therapeutic activities to improve functional " performance for 0  minutes, including:  Not performed today    gait training to improve functional mobility and safety for 0  minutes, including:  Not performed today    supervised modalities after being cleared for contradictions: IFC Electrical Stimulation:  Julieth received IFC Electrical Stimulation for pain control applied to the 0. Pt received stimulation at 0 % scan at a frequency of 0 for 0 minutes. Julieth tolerated treatment well without any adverse effects.      hot pack for 0 minutes to 0.    cold pack for 0 minutes to 0.    Patient Education and Home Exercises       Education provided:   - arch supports    Written Home Exercises Provided: Patient instructed to cont prior HEP. Exercises were reviewed and Julieth was able to demonstrate them prior to the end of the session.  Julieth demonstrated fair  understanding of the education provided. See Electronic Medical Record under Patient Instructions for exercises provided during therapy sessions    Assessment     Pt returns to therapy today after 3 week hiatus. She reports she was sick and then had transportation issues. Pt's main complaint is pain lateral R ankle. She reports increase pain during inversion ex's. Provided education as needed on shoe support. No change reported post session.     Patient prognosis is Good.     Patient will continue to benefit from skilled outpatient physical therapy to address the deficits listed in the problem list box on initial evaluation, provide pt/family education and to maximize pt's level of independence in the home and community environment.     Patient's spiritual, cultural and educational needs considered and pt agreeable to plan of care and goals.     Anticipated barriers to physical therapy: none    Goals:   Short Term Goals: 2 weeks   1. Patient demonstrates independence with HEP.   2. Patient to restore right ankle ROM to WNL.     Long Term Goals: 4 weeks   1. Patient demonstrates increased balance and proprioception to  WNL to improve tolerance to functional activities.   2. Patient demonstrates increased strength BLE's to 4/5 or greater to improve tolerance to functional activities.   3. Patient demonstrates improved overall function per LEFS to 28% or less.     Plan     Plan of care Certification: 6/24/2024 to 7/23/2024.     Outpatient Physical Therapy 2 times weekly for 4 weeks to include the following interventions: Electrical Stimulation  , Manual Therapy, Moist Heat/ Ice, Neuromuscular Re-ed, Patient Education, Therapeutic Activities, and Therapeutic Exercise.     An Bates, PTA

## 2024-07-24 ENCOUNTER — OFFICE VISIT (OUTPATIENT)
Dept: PODIATRY | Facility: CLINIC | Age: 29
End: 2024-07-24
Payer: MEDICAID

## 2024-07-24 VITALS — HEIGHT: 63 IN | WEIGHT: 293 LBS | BODY MASS INDEX: 51.91 KG/M2

## 2024-07-24 DIAGNOSIS — M94.9 OSTEOCHONDRAL LESION OF TALAR DOME: Primary | ICD-10-CM

## 2024-07-24 DIAGNOSIS — M25.571 PAIN IN JOINT INVOLVING RIGHT ANKLE AND FOOT: ICD-10-CM

## 2024-07-24 DIAGNOSIS — M65.9 SYNOVITIS OF RIGHT ANKLE: ICD-10-CM

## 2024-07-24 DIAGNOSIS — M89.9 OSTEOCHONDRAL LESION OF TALAR DOME: Primary | ICD-10-CM

## 2024-07-24 PROCEDURE — 3008F BODY MASS INDEX DOCD: CPT | Mod: CPTII,,, | Performed by: PODIATRIST

## 2024-07-24 PROCEDURE — 99214 OFFICE O/P EST MOD 30 MIN: CPT | Mod: PBBFAC,PN | Performed by: PODIATRIST

## 2024-07-24 PROCEDURE — 99213 OFFICE O/P EST LOW 20 MIN: CPT | Mod: S$PBB,,, | Performed by: PODIATRIST

## 2024-07-24 PROCEDURE — 99999 PR PBB SHADOW E&M-EST. PATIENT-LVL IV: CPT | Mod: PBBFAC,,, | Performed by: PODIATRIST

## 2024-07-24 PROCEDURE — 3044F HG A1C LEVEL LT 7.0%: CPT | Mod: CPTII,,, | Performed by: PODIATRIST

## 2024-07-24 RX ORDER — CARIPRAZINE 1.5 MG/1
CAPSULE, GELATIN COATED ORAL
COMMUNITY
Start: 2024-06-13

## 2024-07-24 RX ORDER — PRAZOSIN HYDROCHLORIDE 1 MG/1
CAPSULE ORAL
COMMUNITY
Start: 2024-05-17

## 2024-07-24 RX ORDER — SEMAGLUTIDE 2.68 MG/ML
INJECTION, SOLUTION SUBCUTANEOUS
COMMUNITY
Start: 2024-07-17

## 2024-07-24 NOTE — PROGRESS NOTES
[facility] - PODIATRY  [address]  [address]  [place] [address]  Dept: [phone]  Dept Fax: [phone]    Roosevelt Orozco Jr., DPM     Assessment:   MDM    Coding  1. 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 Synovitis of right ankle        3. Pain in joint involving right ankle and foot            Plan:     Procedures  1. 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 Synovitis of right ankle    3. Pain in joint involving right ankle and foot      Julieth was seen today for follow-up.    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 All questions/concerns addressed  -all tx options discussed. 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    Subjective:      Patient ID: Julieth Randhawa is a 29 y.o. female.    Chief Complaint:   Chief Complaint   Patient presents with    Follow-up     F/U  Right ankle       CC - foot pain: patient presents to the podiatry clinic  with complaint of  right foot pain.   She reports history of right ankle fracture a few years ago, says that she was told that she needed surgery but didn't want to have surgery.  Pain has continued since then.  Today she c/o diffuse achy/sharp ankle pain that now radiates up leg into knee and hip.  Worse with  walking/weight bearing and improves some with rest.Swelling but no redness or warmth.No numbness/tingling.  Treated conservatively with Ortho PA and sent here for eval after failing conservative measures (rx nsaid and walking in CAM boot). Currently taking mobic. MRI has been obtained.   Reports ankle does feel unstable and gives way from time to time.      7/24/24:  Hx as above. F/u for R talar OCD. Failed conservative measures and still in pain.     Ankle Pain     Follow-up      Last Podiatry Enc: Visit date not found  Last Enc w/ Me: Visit date not found    Outside reports reviewed: historical medical records.  Family hx: as below  Past Medical History:   Diagnosis Date    Endometriosis     Heart attack 2019    Syncope and collapse      Past Surgical History:   Procedure Laterality Date    TONSILLECTOMY       Family History   Problem Relation Name Age of Onset    Hypertension Mother       Current Outpatient Medications   Medication Sig Dispense Refill    cariprazine HCl (VRAYLAR ORAL) Take by mouth.      meloxicam (MOBIC) 15 MG tablet Take 1 tablet (15 mg total) by mouth once daily. 30 tablet 0    OZEMPIC 2 mg/dose (8 mg/3 mL) PnIj Inject into the skin.      prazosin (MINIPRESS) 1 MG Cap 1 capsule at bedtime for night turcios Orally Once a day for 30 days      VRAYLAR 1.5 mg Cap TAKE 1 CAPSULE BY MOUTH ONCE DAILY (DOSAGE CHANGE)      aspirin 325 MG tablet Take 1 tablet (325 mg total) by mouth 2 (two) times a day. 120 tablet 0    buPROPion (WELLBUTRIN XL) 150 MG TB24 tablet Take 1 tablet (150 mg total) by mouth once daily. 30 tablet 11     No current facility-administered medications for this visit.     Review of patient's allergies indicates:   Allergen Reactions    Turmeric Anaphylaxis and Other (See Comments)     Social History     Socioeconomic History    Marital status: Single   Tobacco Use    Smoking status: Never    Smokeless tobacco: Never   Substance and Sexual Activity    Alcohol use: Never    Drug use:  "Never    Sexual activity: Yes     Partners: Female       ROS    REVIEW OF SYSTEMS: Negative as documented below as well as positive findings in bold.       Constitutional  Respiratory  Gastrointestinal  Skin   - Fever - Cough - Heartburn - Rash   - Chills - Spit blood - Nausea - Itching   - Weight Loss - Shortness of breath - Vomiting - Nail pain   - Malaise/Fatigue - Wheezing - Abdominal Pain  Wound/Ulcer   - Weight Gain   - Blood in Stool  Poor wound healing       - Diarrhea          Cardiovascular  Genitourinary  Neurological  HEENT   - Chest Pain - Dysuria - Burning Sensation of feet - Headache   - Palpitations - Hematuria - Tingling / Paresthesia - Congestion   - Pain at night in legs - Flank Pain - Dizziness - Sore Throat   - Cramping   - Tremor - Blurred Vision   - Leg Swelling   - Sensory Change - Double Vision   - Dizzy when standing   - Speech Change - Eye Redness       - Focal Weakness - Dry Eyes       - Loss of Consciousness          Endocrine  Musculoskeletal  Psychiatric   - Cold intolerance - Muscle Pain - Depression   - Heat intolerance - Neck Pain - Insomnia   - Anemia - Joint Pain - Memory Loss   -  Easy bruising, bleeding - Heel pain - Anxiety      Toe Pain        Leg/Ankle/Foot Pain         Objective:     Ht 5' 3" (1.6 m)   Wt (!) 141 kg (310 lb 13.6 oz)   BMI 55.06 kg/m²   Vitals:    07/24/24 1338   Weight: (!) 141 kg (310 lb 13.6 oz)   Height: 5' 3" (1.6 m)   PainSc:   7   PainLoc: Ankle       Physical Exam    General Appearance:   Patient appears well developed, well nourished  Patient appears stated age    Psychiatric:   Patient is oriented to time, place, and person.  Patient has appropriate mood and affect    Neck:  Trachea Midline  No visible masses    Respiratory/Ears:  No distress or labored breathing.  Able to differentiate between normal talking voice and whisper.  Able to follow commands    Eyes:  Visual Acuity intact  Lids and conjunctivae normal. No discoloration noted.    Foot " Exam  Physical Exam  Ortho Exam  Ortho/SPM Exam  Physical Exam  Foot/Ankle Musculoskeletal Exam    R LE exam con't:  V:  DP 2/4, PT 2/4   CRT< 3s to all digits tested   Tibial and popliteal lymph nodes are w/o abnormality   Edema: present, varicosities: present    N:  Patient displays normal ankle reflexes   SILT in SP/DP/T/Hunter/Saph distributions    Ortho: +Motor EHL/FHL/TA/GA   There is mild decreased ROM at the ankle and STJ joints: pain is present lateral ankle gutter, crepitus is not present  There is moderate pain with palpation of lateral ankle  No pain with DF/PF  Mod pain Inv/Ev  Compartments soft/compressible. No pain on passive stretch of big toe. No calf  Pain.    Derm:  skin intact, skin warm and dry, skin without ulcers or lesions, skin without induration, nails normal, no erythema and no ecchymosis    Imaging / Labs:    MRI Ankle Without Contrast Right  Order: [id number]  Status: Final result       Visible to patient: Yes (seen)       Next appt: None       Dx: Chronic pain of right ankle    0 Result Notes  Details    Reading Physician Reading Date Result Priority   Adalberto Schulte MD  [phone]  [phone] 3/13/2024 Routine     Narrative & Impression  EXAMINATION:  MRI ANKLE WITHOUT CONTRAST RIGHT     CLINICAL HISTORY:  Ankle pain, instability suspected, neg xray;  Pain in right ankle and joints of right foot     TECHNIQUE:  Multiplanar multi sequence MR imaging of the right ankle.     COMPARISON:  01/23/2024     FINDINGS:  Bones: No acute fracture, avascular necrosis or marrow replacement.  Plantar calcaneal spur.     Joint: There is a tibiotalar joint effusion.  No malalignment.  There is an osteochondral lesion along the lateral talar dome central 3rd.  Minimal marginal spurs of the tibiotalar joint.     Medial ankle ligaments: Deltoid and spring ligament complex are intact.     Lateral ankle ligaments: Anterior and posterior tibiofibular and talofibular ligaments are intact.   Calcaneofibular ligament is intact.     Extensor tendons: Intact.     Flexor tendons: Intact.     Peroneal tendons: Intact.     Plantar Fascia: Intact.     Sinus Tarsi: There is partial replacement of fat in the sinus tarsi with edematous soft tissue.     Tarsal Tunnel: Unremarkable.     Soft Tissues: There is a septated 1 cm focal fluid collection along the medial margin of the calcaneal body.     Impression:     1. Osteochondral lesion along the talar dome.  2. Minimal degenerative change of the tibiotalar joint with effusion.  3. Findings raise the possibility for sinus tarsi syndrome, in the appropriate clinical setting.  4. Ganglion cyst medial hindfoot.        Electronically signed by:Adalberto Schulte  Date:                                            03/13/2024  Time:                                           15:15           Exam Ended: 03/13/24 14:59 CDT Last Resulted: 03/13/24 15:15 CDT               Note: This was dictated using a computer transcription program. Although proofread, it may contain computer transcription errors and phonetic errors. Other human proofreading errors may also exist. Corrections may be performed at a later time. Please contact us for any clarification if needed.    Roosevelt Orozco DPM  [facility]

## 2024-08-16 ENCOUNTER — HOSPITAL ENCOUNTER (OUTPATIENT)
Dept: RADIOLOGY | Facility: HOSPITAL | Age: 29
Discharge: HOME | End: 2024-08-16
Attending: PODIATRIST
Payer: MEDICAID

## 2024-08-16 ENCOUNTER — HOSPITAL ENCOUNTER (OUTPATIENT)
Dept: PULMONOLOGY | Facility: HOSPITAL | Age: 29
Discharge: HOME | End: 2024-08-16
Attending: PODIATRIST
Payer: MEDICAID

## 2024-08-16 DIAGNOSIS — M94.9 OSTEOCHONDRAL LESION OF TALAR DOME: ICD-10-CM

## 2024-08-16 DIAGNOSIS — M89.9 OSTEOCHONDRAL LESION OF TALAR DOME: ICD-10-CM

## 2024-08-16 PROCEDURE — 99900035 HC TECH TIME PER 15 MIN (STAT)

## 2024-08-16 PROCEDURE — 93005 ELECTROCARDIOGRAM TRACING: CPT

## 2024-08-16 PROCEDURE — 71046 X-RAY EXAM CHEST 2 VIEWS: CPT | Mod: TC

## 2024-08-16 PROCEDURE — 71046 X-RAY EXAM CHEST 2 VIEWS: CPT | Mod: 26,,, | Performed by: RADIOLOGY

## 2024-08-16 PROCEDURE — 93010 ELECTROCARDIOGRAM REPORT: CPT | Mod: ,,, | Performed by: INTERNAL MEDICINE

## 2024-08-19 LAB
OHS QRS DURATION: 84 MS
OHS QTC CALCULATION: 462 MS

## 2024-08-21 ENCOUNTER — OFFICE VISIT (OUTPATIENT)
Dept: PODIATRY | Facility: CLINIC | Age: 29
End: 2024-08-21
Payer: MEDICAID

## 2024-08-21 VITALS — WEIGHT: 293 LBS | BODY MASS INDEX: 55.06 KG/M2

## 2024-08-21 DIAGNOSIS — M94.9 OSTEOCHONDRAL LESION OF TALAR DOME: Primary | ICD-10-CM

## 2024-08-21 DIAGNOSIS — M89.9 OSTEOCHONDRAL LESION OF TALAR DOME: Primary | ICD-10-CM

## 2024-08-21 DIAGNOSIS — M65.9 SYNOVITIS OF RIGHT ANKLE: ICD-10-CM

## 2024-08-21 PROCEDURE — 1159F MED LIST DOCD IN RCRD: CPT | Mod: CPTII,,, | Performed by: PODIATRIST

## 2024-08-21 PROCEDURE — 99999 PR PBB SHADOW E&M-EST. PATIENT-LVL III: CPT | Mod: PBBFAC,,, | Performed by: PODIATRIST

## 2024-08-21 PROCEDURE — 3008F BODY MASS INDEX DOCD: CPT | Mod: CPTII,,, | Performed by: PODIATRIST

## 2024-08-21 PROCEDURE — 99213 OFFICE O/P EST LOW 20 MIN: CPT | Mod: PBBFAC,PN | Performed by: PODIATRIST

## 2024-08-21 PROCEDURE — 3044F HG A1C LEVEL LT 7.0%: CPT | Mod: CPTII,,, | Performed by: PODIATRIST

## 2024-08-21 PROCEDURE — 99214 OFFICE O/P EST MOD 30 MIN: CPT | Mod: S$PBB,,, | Performed by: PODIATRIST

## 2024-08-21 RX ORDER — GABAPENTIN 100 MG/1
100 CAPSULE ORAL 2 TIMES DAILY
Qty: 60 CAPSULE | Refills: 1 | Status: SHIPPED | OUTPATIENT
Start: 2024-08-21 | End: 2024-10-20

## 2024-08-21 RX ORDER — HYDROCODONE BITARTRATE AND ACETAMINOPHEN 5; 325 MG/1; MG/1
1 TABLET ORAL EVERY 4 HOURS PRN
Qty: 25 TABLET | Refills: 0 | Status: SHIPPED | OUTPATIENT
Start: 2024-08-21

## 2024-08-21 RX ORDER — MELOXICAM 7.5 MG/1
7.5 TABLET ORAL DAILY
Qty: 60 TABLET | Refills: 0 | Status: SHIPPED | OUTPATIENT
Start: 2024-08-21 | End: 2024-10-20

## 2024-08-21 RX ORDER — ASPIRIN 325 MG
325 TABLET ORAL 2 TIMES DAILY
Qty: 120 TABLET | Refills: 0 | Status: SHIPPED | OUTPATIENT
Start: 2024-08-21 | End: 2024-10-20

## 2024-08-21 RX ORDER — CEPHALEXIN 500 MG/1
500 CAPSULE ORAL EVERY 6 HOURS
Qty: 28 CAPSULE | Refills: 0 | Status: SHIPPED | OUTPATIENT
Start: 2024-08-21 | End: 2024-08-28

## 2024-08-21 RX ORDER — ASCORBIC ACID 500 MG
500 TABLET ORAL DAILY
Qty: 60 TABLET | Refills: 0 | Status: SHIPPED | OUTPATIENT
Start: 2024-08-21 | End: 2024-10-20

## 2024-08-21 NOTE — PATIENT INSTRUCTIONS
Report to the Same Day Surgery unit on 9/13/25     1. DO NOT EAT OR DRINK ANYTHING AFTER THE MIDNIGHT BEFORE SURGERY     2. Do not drink any alcohol or take any mind-altering drugs 24 hours before surgery.     3. STOP TAKING: Coumadin, Plavix, Pletal, Aggrenox, Aspirin, Aleve, Naproxen, Advil, Motrin, Ibuprofen, [place], Celebrex, Allopurinol, and any medications containing aspirin or antiinflammatories N/A unless instructed otherwise by your Primary Care Provider     4. You may take Tylenol and your other medications up until midnight before your surgery.     5. Take the following medications the morning of your procedure with a sip of water (less than an ounce): Effexor,Depakote     6. Leave all valuables at home.     7. Bathe like you normally do the morning of or the night before surgery, preferably with an anti-bacterial soap     8. Only 2 visitors will be allowed on the unit with patients. Children under 12 years old are not allowed to visit on unit.     9. YOU WILL NOT BE ALLOWED TO DRIVE HOME AFTER YOUR PROCEDURE!!! A family member or friend must be in the unit with you to receive discharge instructions and to sign the papers for you to be discharged home. Also, you must make arrangements before your surgery day to have a ride home in a private vehicle (no buses or public transportation allowed). YOU WILL NOT BE ALLOWED TO WALK HOME, NOR WILL STAFF BE RESPONSIBLE FOR MAKING THESE ARRANGEMENTS FOR YOU!!! FAILURE TO MAKE THE PROPER ARRANGEMENTS FOR YOURSELF MAY RESULT IN THE CANCELLATION OF YOUR PROCEDURE!    10. Obtain a History & Physical for surgical clearance for your surgery from your Primary Care Provider within 30 days of your date of surgery. Have their office fax us a copy of the H&P. Bring a paper copy of the H&P with you to surgery.

## 2024-08-21 NOTE — PROGRESS NOTES
[facility] - PODIATRY  [address]  [address]  [place] [address]  Dept: [phone]  Dept Fax: [phone]    Roosevelt Orozco Jr., DPKATHERINE     Assessment:   MDM    Coding  1. 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 Synovitis of right ankle  cephALEXin (KEFLEX) 500 MG capsule    HYDROcodone-acetaminophen (NORCO) 5-325 mg per tablet    gabapentin (NEURONTIN) 100 MG capsule    meloxicam (MOBIC) 7.5 MG tablet    ascorbic acid, vitamin C, (VITAMIN C) 500 MG tablet    Ambulatory referral/consult to Physical/Occupational Therapy    aspirin 325 MG tablet          Plan:     Procedures  1. 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 for 7 days  Dispense: 28 capsule; Refill: 0  -     HYDROcodone-acetaminophen (NORCO) 5-325 mg per tablet; Take 1 tablet by mouth every 4 (four) hours as needed for Pain.  Dispense: 25 tablet; Refill: 0  -     gabapentin (NEURONTIN) 100 MG capsule; Take 1 capsule (100 mg total) by mouth 2 (two) times daily.  Dispense: 60 capsule; Refill: 1  -     meloxicam (MOBIC) 7.5 MG tablet; Take 1 tablet (7.5 mg total) by mouth once daily.  Dispense: 60 tablet; Refill: 0  -     ascorbic acid, vitamin C, (VITAMIN C) 500 MG tablet; Take 1 tablet (500 mg total) by mouth once daily.  Dispense: 60 tablet; Refill: 0  -     Ambulatory referral/consult to Physical/Occupational Therapy; Future; Expected date: 08/28/2024  -     aspirin 325 MG tablet; Take 1 tablet (325 mg total) by mouth 2 (two) times a day.  Dispense: 120 tablet; Refill:  0    2. Synovitis of right ankle  -     cephALEXin (KEFLEX) 500 MG capsule; Take 1 capsule (500 mg total) by mouth every 6 (six) hours. for 7 days  Dispense: 28 capsule; Refill: 0  -     HYDROcodone-acetaminophen (NORCO) 5-325 mg per tablet; Take 1 tablet by mouth every 4 (four) hours as needed for Pain.  Dispense: 25 tablet; Refill: 0  -     gabapentin (NEURONTIN) 100 MG capsule; Take 1 capsule (100 mg total) by mouth 2 (two) times daily.  Dispense: 60 capsule; Refill: 1  -     meloxicam (MOBIC) 7.5 MG tablet; Take 1 tablet (7.5 mg total) by mouth once daily.  Dispense: 60 tablet; Refill: 0  -     ascorbic acid, vitamin C, (VITAMIN C) 500 MG tablet; Take 1 tablet (500 mg total) by mouth once daily.  Dispense: 60 tablet; Refill: 0  -     Ambulatory referral/consult to Physical/Occupational Therapy; Future; Expected date: 08/28/2024  -     aspirin 325 MG tablet; Take 1 tablet (325 mg total) by mouth 2 (two) times a day.  Dispense: 120 tablet; Refill: 0      Julieth was seen today for follow-up.    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 for 7 days  -     HYDROcodone-acetaminophen (NORCO) 5-325 mg per tablet; Take 1 tablet by mouth every 4 (four) hours as needed for Pain.  -     gabapentin (NEURONTIN) 100 MG capsule; Take 1 capsule (100 mg total) by mouth 2 (two) times daily.  -     meloxicam (MOBIC) 7.5 MG tablet; Take 1 tablet (7.5 mg total) by mouth once daily.  -     ascorbic acid, vitamin C, (VITAMIN C) 500 MG tablet; Take 1 tablet (500 mg total) by mouth once daily.  -     Ambulatory referral/consult to Physical/Occupational Therapy; Future  -     aspirin 325 MG tablet; Take 1 tablet (325 mg total) by mouth 2 (two) times a day.    Synovitis of right ankle  -     cephALEXin (KEFLEX) 500 MG capsule; Take 1 capsule (500 mg total) by mouth every 6 (six) hours. for 7  days  -     HYDROcodone-acetaminophen (NORCO) 5-325 mg per tablet; Take 1 tablet by mouth every 4 (four) hours as needed for Pain.  -     gabapentin (NEURONTIN) 100 MG capsule; Take 1 capsule (100 mg total) by mouth 2 (two) times daily.  -     meloxicam (MOBIC) 7.5 MG tablet; Take 1 tablet (7.5 mg total) by mouth once daily.  -     ascorbic acid, vitamin C, (VITAMIN C) 500 MG tablet; Take 1 tablet (500 mg total) by mouth once daily.  -     Ambulatory referral/consult to Physical/Occupational Therapy; Future  -     aspirin 325 MG tablet; Take 1 tablet (325 mg total) by mouth 2 (two) times a day.        -pt seen, evaluated, and managed  -dx discussed in detail. All questions/concerns addressed  -all tx options discussed. 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  -Pt has failed conservative care options possible including but not limited to shoe wear and/or padding, bracing/strapping, at home ROM, formal PT, medical therapy, injection therapy  -XR/imaging reviewed by me: agree with read  - CT reviewed  -given failure of conservative measures, we discussed surgical intervention  -pt would benefit from operative intervention: we discussed the following procedures: R ankle arthroscopy with micro-fx/ sub chondral drilling of talar OCD + BMAC  -Decision regarding elective surgery was made and the patient opts for surgical intervention: yes  -We discussed the patient risk factors and social determinants of health and their impacts including but not limited to: stress and/or mental health strain, social connections strain, family support strain, financial resource strain , severity of deformity / severity of injury, major medical co-morbidities, alcohol or tobacco abuse, housing resource strain, transportation strain, food insecurity, lack of physical activity  -Long discussion with patient regarding the procedure in detail. Patient understands all risks, possible  benefits, potential complications, and alternatives, including, but not limited to those listed on the consent form. Pt understands consequences of failing to proceed with recommended procedure(s). All questions were answered. No guarantees given or implied as to outcome. Patient is aware of the procedure specific complications including but not limited to infection, wound or bone healing problems, damage to surrounding structures, need for additional surgery, loss of toes/foot/leg/life, scar formation, nerve pain, gait issues. They agree and exhibit appropriate understanding of all discussion points. Patient understands post-operative course and agrees to be compliant with care. Teach back method used as part of informed consent process. Informed verbal and written consent was obtained. Consent forms read, signed, witnessed.  -we discussed postop course  -would be out pt, elective surgery  -would GA + block, supine position  -PCP has cleared  -DOS: 9/13/24        -rxs dispensed: postop  -referrals: PT crutch  -WB: wbat      Follow up in 4 weeks (on 9/18/2024).    Subjective:      Patient ID: Julieth Randhawa is a 29 y.o. female.    Chief Complaint:   Chief Complaint   Patient presents with    Follow-up     Pre op       CC - foot pain: patient presents to the podiatry clinic  with complaint of  right foot pain.   She reports history of right ankle fracture a few years ago, says that she was told that she needed surgery but didn't want to have surgery.  Pain has continued since then.  Today she c/o diffuse achy/sharp ankle pain that now radiates up leg into knee and hip.  Worse with walking/weight bearing and improves some with rest.Swelling but no redness or warmth.No numbness/tingling.  Treated conservatively with Ortho PA and sent here for eval after failing conservative measures (rx nsaid and walking in CAM boot). Currently taking mobic. MRI has been obtained.   Reports ankle does feel unstable and gives way  from time to time.      8/21/24:  Hx as above. F/u for R talar OCD. Failed conservative measures and still in pain. Pt has been cleared by PCP and she is requesting surgery.    Ankle Pain     Follow-up        Last Podiatry Enc: Visit date not found  Last Enc w/ Me: Visit date not found    Outside reports reviewed: historical medical records.  Family hx: as below  Past Medical History:   Diagnosis Date    Endometriosis     Heart attack 2019    Syncope and collapse      Past Surgical History:   Procedure Laterality Date    TONSILLECTOMY       Family History   Problem Relation Name Age of Onset    Hypertension Mother       Current Outpatient Medications   Medication Sig Dispense Refill    cariprazine HCl (VRAYLAR ORAL) Take by mouth.      OZEMPIC 2 mg/dose (8 mg/3 mL) PnIj Inject into the skin.      prazosin (MINIPRESS) 1 MG Cap 1 capsule at bedtime for night turcios Orally Once a day for 30 days      VRAYLAR 1.5 mg Cap TAKE 1 CAPSULE BY MOUTH ONCE DAILY (DOSAGE CHANGE)      ascorbic acid, vitamin C, (VITAMIN C) 500 MG tablet Take 1 tablet (500 mg total) by mouth once daily. 60 tablet 0    aspirin 325 MG tablet Take 1 tablet (325 mg total) by mouth 2 (two) times a day. 120 tablet 0    buPROPion (WELLBUTRIN XL) 150 MG TB24 tablet Take 1 tablet (150 mg total) by mouth once daily. 30 tablet 11    cephALEXin (KEFLEX) 500 MG capsule Take 1 capsule (500 mg total) by mouth every 6 (six) hours. for 7 days 28 capsule 0    gabapentin (NEURONTIN) 100 MG capsule Take 1 capsule (100 mg total) by mouth 2 (two) times daily. 60 capsule 1    HYDROcodone-acetaminophen (NORCO) 5-325 mg per tablet Take 1 tablet by mouth every 4 (four) hours as needed for Pain. 25 tablet 0    meloxicam (MOBIC) 7.5 MG tablet Take 1 tablet (7.5 mg total) by mouth once daily. 60 tablet 0     No current facility-administered medications for this visit.     Review of patient's allergies indicates:   Allergen Reactions    Turmeric Anaphylaxis and Other (See  Comments)     Social History     Socioeconomic History    Marital status: Single   Tobacco Use    Smoking status: Never    Smokeless tobacco: Never   Substance and Sexual Activity    Alcohol use: Never    Drug use: Never    Sexual activity: Yes     Partners: Female       ROS    REVIEW OF SYSTEMS: Negative as documented below as well as positive findings in bold.       Constitutional  Respiratory  Gastrointestinal  Skin   - Fever - Cough - Heartburn - Rash   - Chills - Spit blood - Nausea - Itching   - Weight Loss - Shortness of breath - Vomiting - Nail pain   - Malaise/Fatigue - Wheezing - Abdominal Pain  Wound/Ulcer   - Weight Gain   - Blood in Stool  Poor wound healing       - Diarrhea          Cardiovascular  Genitourinary  Neurological  HEENT   - Chest Pain - Dysuria - Burning Sensation of feet - Headache   - Palpitations - Hematuria - Tingling / Paresthesia - Congestion   - Pain at night in legs - Flank Pain - Dizziness - Sore Throat   - Cramping   - Tremor - Blurred Vision   - Leg Swelling   - Sensory Change - Double Vision   - Dizzy when standing   - Speech Change - Eye Redness       - Focal Weakness - Dry Eyes       - Loss of Consciousness          Endocrine  Musculoskeletal  Psychiatric   - Cold intolerance - Muscle Pain - Depression   - Heat intolerance - Neck Pain - Insomnia   - Anemia - Joint Pain - Memory Loss   -  Easy bruising, bleeding - Heel pain - Anxiety      Toe Pain        Leg/Ankle/Foot Pain         Objective:     Wt (!) 141 kg (310 lb 13.6 oz)   BMI 55.06 kg/m²   Vitals:    08/21/24 1448   Weight: (!) 141 kg (310 lb 13.6 oz)   PainSc:   6       Physical Exam    General Appearance:   Patient appears well developed, well nourished  Patient appears stated age    Psychiatric:   Patient is oriented to time, place, and person.  Patient has appropriate mood and affect    Neck:  Trachea Midline  No visible masses    Respiratory/Ears:  No distress or labored breathing.  Able to differentiate between  normal talking voice and whisper.  Able to follow commands    Eyes:  Visual Acuity intact  Lids and conjunctivae normal. No discoloration noted.    Foot Exam  Physical Exam  Ortho Exam  Ortho/SPM Exam  Physical Exam  Foot/Ankle Musculoskeletal Exam    R LE exam con't:  V:  DP 2/4, PT 2/4   CRT< 3s to all digits tested   Tibial and popliteal lymph nodes are w/o abnormality   Edema: present, varicosities: present    N:  Patient displays normal ankle reflexes   SILT in SP/DP/T/Hunter/Saph distributions    Ortho: +Motor EHL/FHL/TA/GA   There is mild decreased ROM at the ankle and STJ joints: pain is present lateral ankle gutter, crepitus is not present  There is moderate pain with palpation of lateral ankle  No pain with DF/PF  Mod pain Inv/Ev  Compartments soft/compressible. No pain on passive stretch of big toe. No calf  Pain.    Derm:  skin intact, skin warm and dry, skin without ulcers or lesions, skin without induration, nails normal, no erythema and no ecchymosis    Imaging / Labs:    MRI Ankle Without Contrast Right  Order: [id number]  Status: Final result       Visible to patient: Yes (seen)       Next appt: None       Dx: Chronic pain of right ankle    0 Result Notes  Details    Reading Physician Reading Date Result Priority   Adalberto Schulte MD  [phone]  [phone] 3/13/2024 Routine     Narrative & Impression  EXAMINATION:  MRI ANKLE WITHOUT CONTRAST RIGHT     CLINICAL HISTORY:  Ankle pain, instability suspected, neg xray;  Pain in right ankle and joints of right foot     TECHNIQUE:  Multiplanar multi sequence MR imaging of the right ankle.     COMPARISON:  01/23/2024     FINDINGS:  Bones: No acute fracture, avascular necrosis or marrow replacement.  Plantar calcaneal spur.     Joint: There is a tibiotalar joint effusion.  No malalignment.  There is an osteochondral lesion along the lateral talar dome central 3rd.  Minimal marginal spurs of the tibiotalar joint.     Medial ankle ligaments: Deltoid and  spring ligament complex are intact.     Lateral ankle ligaments: Anterior and posterior tibiofibular and talofibular ligaments are intact.  Calcaneofibular ligament is intact.     Extensor tendons: Intact.     Flexor tendons: Intact.     Peroneal tendons: Intact.     Plantar Fascia: Intact.     Sinus Tarsi: There is partial replacement of fat in the sinus tarsi with edematous soft tissue.     Tarsal Tunnel: Unremarkable.     Soft Tissues: There is a septated 1 cm focal fluid collection along the medial margin of the calcaneal body.     Impression:     1. Osteochondral lesion along the talar dome.  2. Minimal degenerative change of the tibiotalar joint with effusion.  3. Findings raise the possibility for sinus tarsi syndrome, in the appropriate clinical setting.  4. Ganglion cyst medial hindfoot.        Electronically signed by:Adalberto Schulte  Date:                                            03/13/2024  Time:                                           15:15           Exam Ended: 03/13/24 14:59 CDT Last Resulted: 03/13/24 15:15 CDT               Note: This was dictated using a computer transcription program. Although proofread, it may contain computer transcription errors and phonetic errors. Other human proofreading errors may also exist. Corrections may be performed at a later time. Please contact us for any clarification if needed.    Roosevelt Orozco DPM  [facility]

## 2024-09-20 PROBLEM — M65.971 SYNOVITIS OF RIGHT ANKLE: Status: ACTIVE | Noted: 2024-06-24

## 2024-09-20 PROBLEM — M65.9 SYNOVITIS OF ANKLE: Status: ACTIVE | Noted: 2024-09-20

## 2024-09-20 PROBLEM — M65.979 SYNOVITIS OF ANKLE: Status: ACTIVE | Noted: 2024-09-20

## 2024-09-25 ENCOUNTER — OFFICE VISIT (OUTPATIENT)
Dept: PODIATRY | Facility: CLINIC | Age: 29
End: 2024-09-25
Payer: MEDICAID

## 2024-09-25 VITALS — BODY MASS INDEX: 51.91 KG/M2 | WEIGHT: 293 LBS | HEIGHT: 63 IN | RESPIRATION RATE: 18 BRPM

## 2024-09-25 DIAGNOSIS — Z47.89 AFTERCARE FOLLOWING SURGERY OF THE MUSCULOSKELETAL SYSTEM: Primary | ICD-10-CM

## 2024-09-25 DIAGNOSIS — M94.9 OSTEOCHONDRAL LESION OF TALAR DOME: ICD-10-CM

## 2024-09-25 DIAGNOSIS — M89.9 OSTEOCHONDRAL LESION OF TALAR DOME: ICD-10-CM

## 2024-09-25 DIAGNOSIS — M65.9 SYNOVITIS OF RIGHT ANKLE: ICD-10-CM

## 2024-09-25 PROCEDURE — 99213 OFFICE O/P EST LOW 20 MIN: CPT | Mod: PBBFAC,PN | Performed by: PODIATRIST

## 2024-09-25 PROCEDURE — 99999 PR PBB SHADOW E&M-EST. PATIENT-LVL III: CPT | Mod: PBBFAC,,, | Performed by: PODIATRIST

## 2024-09-25 PROCEDURE — 99024 POSTOP FOLLOW-UP VISIT: CPT | Mod: ,,, | Performed by: PODIATRIST

## 2024-09-25 PROCEDURE — 3044F HG A1C LEVEL LT 7.0%: CPT | Mod: CPTII,,, | Performed by: PODIATRIST

## 2024-09-25 PROCEDURE — 1159F MED LIST DOCD IN RCRD: CPT | Mod: CPTII,,, | Performed by: PODIATRIST

## 2024-09-25 RX ORDER — ONDANSETRON 4 MG/1
4 TABLET, FILM COATED ORAL EVERY 6 HOURS PRN
Qty: 25 TABLET | Refills: 0 | Status: SHIPPED | OUTPATIENT
Start: 2024-09-25

## 2024-09-25 RX ORDER — HYDROCODONE BITARTRATE AND ACETAMINOPHEN 5; 325 MG/1; MG/1
1 TABLET ORAL EVERY 4 HOURS PRN
Qty: 25 TABLET | Refills: 0 | Status: SHIPPED | OUTPATIENT
Start: 2024-09-25

## 2024-09-25 RX ORDER — CYCLOBENZAPRINE HCL 5 MG
5 TABLET ORAL 3 TIMES DAILY PRN
Qty: 30 TABLET | Refills: 0 | Status: SHIPPED | OUTPATIENT
Start: 2024-09-25 | End: 2024-10-05

## 2024-09-26 NOTE — PROGRESS NOTES
[facility] - PODIATRY  [address]  [address]  [place] [address]  Dept: [phone]  Dept Fax: [phone]    Roosevelt Orozco Jr., DPM   Roosevelt Orozco Jr.     Post-Operative Visit  Assessment:     1. Aftercare following surgery of the musculoskeletal system  ondansetron (ZOFRAN) 4 MG tablet    cyclobenzaprine (FLEXERIL) 5 MG tablet      2. 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 Synovitis of right ankle  HYDROcodone-acetaminophen (NORCO) 5-325 mg per tablet          Plan:   MDM    Coding    - pt seen evaluated and managed  - skin healed  - sutures removed. steris applied  - dressings re-applied  - wb: nwb RLE  - rx dispensed: norco renewed. Added flexeril and zofran  - referrals: none      Follow up in about 1 week (around 10/2/2024).      Subjective:      Patient ID: Julieth Randhawa is a 29 y.o. female.    Chief Complaint:   Chief Complaint   Patient presents with    Post-op Evaluation     Vitals:    09/25/24 1351   Resp: 18       DOS: 9/13/24  Procedure: R ankle arthroscopy with micro fx repair of talar OCD    Julieth Randhawa returns to the clinic today for the 1st postop visit. Pt is s/p above procedure. Julieth Randhawa rates pain at a 7/10 on visual analog scale. Denies v/f/c. Reports nausea and muscle spasms of leg.    HPI      Outside reports reviewed: historical medical records.    Past Medical History:   Diagnosis Date    Endometriosis     Heart attack 2019    silent heart attack, cardiac visits every 6 mths; told no issue until older    Osteochondral lesion of talar dome     right foot    Syncope and collapse     one time episode a couple of years ago    Synovitis of right ankle      Past Surgical History:   Procedure Laterality Date    ARTHROSCOPY, ANKLE, WITH DRILLING AND TALUS OSTEOCHONDRAL DEFECT EXCISION Right 9/13/2024    Procedure: ARTHROSCOPY, ANKLE, WITH  DRILLING AND TALUS OSTEOCHONDRAL DEFECT EXCISION;  Surgeon: Roosevelt Orozco Jr., DPM;  Location: [facility] OR;  Service: Podiatry;  Laterality: Right;    BONE MARROW ASPIRATION Right 9/13/2024    Procedure: ASPIRATION, BONE MARROW;  Surgeon: Roosevelt Orozco Jr., DPM;  Location: [facility] OR;  Service: Podiatry;  Laterality: Right;    TONSILLECTOMY      WISDOM TOOTH EXTRACTION  2022     Family History   Problem Relation Name Age of Onset    Hypertension Mother       Current Outpatient Medications   Medication Sig Dispense Refill    ascorbic acid, vitamin C, (VITAMIN C) 500 MG tablet Take 1 tablet (500 mg total) by mouth once daily. (Patient taking differently: Take 500 mg by mouth once daily. For after surgery) 60 tablet 0    aspirin 325 MG tablet Take 1 tablet (325 mg total) by mouth 2 (two) times a day. (Patient taking differently: Take 325 mg by mouth 2 (two) times a day. For after surgery) 120 tablet 0    cariprazine HCl (VRAYLAR ORAL) Take 0.5 mg by mouth every other day. nightly      clonazePAM (KLONOPIN) 0.5 MG tablet Take 0.5 mg by mouth every evening.      gabapentin (NEURONTIN) 100 MG capsule Take 1 capsule (100 mg total) by mouth 2 (two) times daily. (Patient taking differently: Take 100 mg by mouth 2 (two) times daily. For after surgery) 60 capsule 1    meloxicam (MOBIC) 7.5 MG tablet Take 1 tablet (7.5 mg total) by mouth once daily. (Patient taking differently: Take 7.5 mg by mouth once daily. For after surgery) 60 tablet 0    prazosin (MINIPRESS) 1 MG Cap Take 1 mg by mouth nightly as needed.      buPROPion (WELLBUTRIN XL) 150 MG TB24 tablet Take 1 tablet (150 mg total) by mouth once daily. (Patient taking differently: Take 150 mg by mouth every evening.) 30 tablet 11    cyclobenzaprine (FLEXERIL) 5 MG tablet Take 1 tablet (5 mg total) by mouth 3 (three) times daily as needed for Muscle spasms. 30 tablet 0    HYDROcodone-acetaminophen (NORCO) 5-325 mg per tablet Take 1 tablet by mouth every 4 (four) hours as  "needed for Pain. 25 tablet 0    ondansetron (ZOFRAN) 4 MG tablet Take 1 tablet (4 mg total) by mouth every 6 (six) hours as needed for Nausea. 25 tablet 0     No current facility-administered medications for this visit.     Review of patient's allergies indicates:   Allergen Reactions    Turmeric Anaphylaxis and Other (See Comments)     Social History     Socioeconomic History    Marital status: Single   Tobacco Use    Smoking status: Never    Smokeless tobacco: Never   Substance and Sexual Activity    Alcohol use: Never    Drug use: Never    Sexual activity: Yes     Partners: Female       ROS  REVIEW OF SYSTEMS: Negative as documented below as well as positive findings in bold.       Constitutional  Respiratory  Gastrointestinal  Skin   - Fever - Cough - Heartburn - Rash   - Chills - Spit blood - Nausea - Itching   - Weight Loss - Shortness of breath - Vomiting - Nail pain   - Malaise/Fatigue - Wheezing - Abdominal Pain  Wound/Ulcer   - Weight Gain   - Blood in Stool         - Diarrhea          Cardiovascular  Genitourinary  Neurological  HEENT   - Chest Pain - Dysuria - Dizziness - Headache   - Palpitations - Hematuria - Tingling - Congestion   - Pain at night in legs - Flank Pain - Tremor - Sore Throat   - Cramping   - Sensory Change - Blurred Vision   - Leg Swelling   - Speech Change - Double Vision   - Dizzy when standing   - Focal Weakness - Eye Redness       - Seizures - Dry Eyes       - Loss of Consciousness          Endocrine  Musculoskeletal  Psychiatric   - Cold intolerance - Muscle Pain - Depression   - Heat intolerance - Neck Pain - Insomnia   - Anemia - Joint Pain - Memory Loss   -  Easy bruising, bleeding - Heel pain - Anxiety      Toe Pain        Leg/Ankle/Foot Pain         Objective:     Resp 18   Ht 5' 3" (1.6 m)   Wt (!) 137 kg (302 lb 0.5 oz)   LMP 08/29/2024 (Exact Date)   BMI 53.50 kg/m²     Physical Exam    Neck:  Trachea Midline  No visible masses    Respiratory/Ears:  No distress or " labored breathing.  Able to differentiate between normal talking voice and whisper.  Able to follow commands    Eyes:  Visual Acuity intact  No discoloration noted.    Physical Exam  Ortho Exam  Foot Exam    R LE exam con't:  V: DP 2/4, PT 2/4, CRT< 3s to all digits tested.    N: SILT in SP/DP/T/Hunter/Saph distributions    Ortho: +Motor EHL/FHL/TA/GA   Surgical site pain present as expected   Surgical site swelling present and moderate   Compartments soft/compressible. No pain on passive stretch of big toe. No calf  Pain.     Derm: Skin intact. Sutures/staples: intact. Signs of infection: none.     Imaging / Labs:    Lab Results   Component Value Date    WBC 10.15 08/16/2024    WBC 8.93 01/23/2024    WBC 9.38 04/20/2023    WBC 8.20 05/03/2022    WBC 9.57 02/21/2022    PREALBUMIN 14 (L) 08/16/2024       Lab Results   Component Value Date    PREALBUMIN 14 (L) 08/16/2024       SURG FL Surgery Fluoro Usage    Result Date: 9/13/2024  See OP Notes for results. IMPRESSION: See OP Notes for results. This procedure was auto-finalized by: Virtual [employer]

## 2024-10-09 ENCOUNTER — OFFICE VISIT (OUTPATIENT)
Dept: PODIATRY | Facility: CLINIC | Age: 29
End: 2024-10-09
Payer: MEDICAID

## 2024-10-09 DIAGNOSIS — Z47.89 AFTERCARE FOLLOWING SURGERY OF THE MUSCULOSKELETAL SYSTEM: Primary | ICD-10-CM

## 2024-10-09 DIAGNOSIS — M89.9 OSTEOCHONDRAL LESION OF TALAR DOME: ICD-10-CM

## 2024-10-09 DIAGNOSIS — M65.971 SYNOVITIS OF RIGHT ANKLE: ICD-10-CM

## 2024-10-09 DIAGNOSIS — M94.9 OSTEOCHONDRAL LESION OF TALAR DOME: ICD-10-CM

## 2024-10-09 PROCEDURE — 3044F HG A1C LEVEL LT 7.0%: CPT | Mod: CPTII,,, | Performed by: PODIATRIST

## 2024-10-09 PROCEDURE — 1159F MED LIST DOCD IN RCRD: CPT | Mod: CPTII,,, | Performed by: PODIATRIST

## 2024-10-09 PROCEDURE — 99213 OFFICE O/P EST LOW 20 MIN: CPT | Mod: PBBFAC,PN | Performed by: PODIATRIST

## 2024-10-09 PROCEDURE — 99024 POSTOP FOLLOW-UP VISIT: CPT | Mod: ,,, | Performed by: PODIATRIST

## 2024-10-09 PROCEDURE — 99999 PR PBB SHADOW E&M-EST. PATIENT-LVL III: CPT | Mod: PBBFAC,,, | Performed by: PODIATRIST

## 2024-10-09 RX ORDER — MELOXICAM 7.5 MG/1
7.5 TABLET ORAL DAILY
Qty: 60 TABLET | Refills: 0 | Status: SHIPPED | OUTPATIENT
Start: 2024-10-09 | End: 2024-12-08

## 2024-10-09 RX ORDER — GABAPENTIN 100 MG/1
100 CAPSULE ORAL 2 TIMES DAILY
Qty: 60 CAPSULE | Refills: 1 | Status: SHIPPED | OUTPATIENT
Start: 2024-10-09 | End: 2024-12-08

## 2024-10-09 RX ORDER — MELOXICAM 7.5 MG/1
7.5 TABLET ORAL DAILY
Qty: 60 TABLET | Refills: 0 | Status: SHIPPED | OUTPATIENT
Start: 2024-10-09 | End: 2024-10-09

## 2024-10-09 RX ORDER — TRAMADOL HYDROCHLORIDE AND ACETAMINOPHEN 37.5; 325 MG/1; MG/1
1 TABLET, FILM COATED ORAL EVERY 6 HOURS PRN
Qty: 25 TABLET | Refills: 0 | Status: SHIPPED | OUTPATIENT
Start: 2024-10-09

## 2024-10-09 RX ORDER — GABAPENTIN 100 MG/1
100 CAPSULE ORAL 2 TIMES DAILY
Qty: 60 CAPSULE | Refills: 1 | Status: SHIPPED | OUTPATIENT
Start: 2024-10-09 | End: 2024-10-09

## 2024-10-09 NOTE — PROGRESS NOTES
[facility] - PODIATRY  [address]  [address]  [place] [address]  Dept: [phone]  Dept Fax: [phone]    Roosevelt Orozco Jr., DPM   Roosevelt Orozco Jr.     Post-Operative Visit  Assessment:     1. Aftercare following surgery of the musculoskeletal system  tramadol-acetaminophen 37.5-325 mg (ULTRACET) 37.5-325 mg Tab    Ambulatory referral/consult to Physical/Occupational Therapy      2. Osteochondral lesion of talar dome  Ambulatory referral/consult to Physical/Occupational Therapy      3. 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  gabapentin (NEURONTIN) 100 MG capsule    meloxicam (MOBIC) 7.5 MG tablet      4. Synovitis of right ankle  gabapentin (NEURONTIN) 100 MG capsule    meloxicam (MOBIC) 7.5 MG tablet          Plan:   MDM    Coding  3 wk s/p    - pt seen evaluated and managed  - skin healed  - sutures removed. steris applied  - dressings re-applied  - wb: nwb RLE  - rx dispensed: mobic and gabapentin renewed. narcotic stepdown to ultracet  - referrals: PT      Follow up in about 3 weeks (around 10/30/2024).      Subjective:      Patient ID: Julieth Randhawa is a 29 y.o. female.    Chief Complaint:   Chief Complaint   Patient presents with    Post-op Evaluation     There were no vitals filed for this visit.      DOS: 9/13/24  Procedure: R ankle arthroscopy with micro fx repair of talar OCD    Julieth Randhawa returns to the clinic today for a postop visit. Pt is s/p above procedure. Julieth Randhawa rates pain at a 6/10 on visual analog scale. Denies v/f/c.    HPI      Outside reports reviewed: historical medical records.    Past Medical History:   Diagnosis Date    Endometriosis     Heart attack 2019    silent heart attack, cardiac visits every 6 mths; told no issue until older    Osteochondral lesion of talar dome     right foot    Syncope and collapse     one time episode a couple of years ago    Synovitis of right ankle       Past Surgical History:   Procedure Laterality Date    ARTHROSCOPY, ANKLE, WITH DRILLING AND TALUS OSTEOCHONDRAL DEFECT EXCISION Right 9/13/2024    Procedure: ARTHROSCOPY, ANKLE, WITH DRILLING AND TALUS OSTEOCHONDRAL DEFECT EXCISION;  Surgeon: Roosevelt Orozco Jr., DPM;  Location: [facility] OR;  Service: Podiatry;  Laterality: Right;    BONE MARROW ASPIRATION Right 9/13/2024    Procedure: ASPIRATION, BONE MARROW;  Surgeon: Roosevelt Orozco Jr., DPM;  Location: [facility] OR;  Service: Podiatry;  Laterality: Right;    TONSILLECTOMY      WISDOM TOOTH EXTRACTION  2022     Family History   Problem Relation Name Age of Onset    Hypertension Mother       Current Outpatient Medications   Medication Sig Dispense Refill    ascorbic acid, vitamin C, (VITAMIN C) 500 MG tablet Take 1 tablet (500 mg total) by mouth once daily. (Patient taking differently: Take 500 mg by mouth once daily. For after surgery) 60 tablet 0    aspirin 325 MG tablet Take 1 tablet (325 mg total) by mouth 2 (two) times a day. (Patient taking differently: Take 325 mg by mouth 2 (two) times a day. For after surgery) 120 tablet 0    cariprazine HCl (VRAYLAR ORAL) Take 0.5 mg by mouth every other day. nightly      clonazePAM (KLONOPIN) 0.5 MG tablet Take 0.5 mg by mouth every evening.      ondansetron (ZOFRAN) 4 MG tablet Take 1 tablet (4 mg total) by mouth every 6 (six) hours as needed for Nausea. 25 tablet 0    prazosin (MINIPRESS) 1 MG Cap Take 1 mg by mouth nightly as needed.      buPROPion (WELLBUTRIN XL) 150 MG TB24 tablet Take 1 tablet (150 mg total) by mouth once daily. (Patient taking differently: Take 150 mg by mouth every evening.) 30 tablet 11    gabapentin (NEURONTIN) 100 MG capsule Take 1 capsule (100 mg total) by mouth 2 (two) times daily. 60 capsule 1    meloxicam (MOBIC) 7.5 MG tablet Take 1 tablet (7.5 mg total) by mouth once daily. 60 tablet 0    tramadol-acetaminophen 37.5-325 mg (ULTRACET) 37.5-325 mg Tab Take 1 tablet by mouth every 6 (six)  hours as needed for Pain. 25 tablet 0     No current facility-administered medications for this visit.     Review of patient's allergies indicates:   Allergen Reactions    Turmeric Anaphylaxis and Other (See Comments)     Social History     Socioeconomic History    Marital status: Single   Tobacco Use    Smoking status: Never    Smokeless tobacco: Never   Substance and Sexual Activity    Alcohol use: Never    Drug use: Never    Sexual activity: Yes     Partners: Female       ROS  REVIEW OF SYSTEMS: Negative as documented below as well as positive findings in bold.       Constitutional  Respiratory  Gastrointestinal  Skin   - Fever - Cough - Heartburn - Rash   - Chills - Spit blood - Nausea - Itching   - Weight Loss - Shortness of breath - Vomiting - Nail pain   - Malaise/Fatigue - Wheezing - Abdominal Pain  Wound/Ulcer   - Weight Gain   - Blood in Stool         - Diarrhea          Cardiovascular  Genitourinary  Neurological  HEENT   - Chest Pain - Dysuria - Dizziness - Headache   - Palpitations - Hematuria - Tingling - Congestion   - Pain at night in legs - Flank Pain - Tremor - Sore Throat   - Cramping   - Sensory Change - Blurred Vision   - Leg Swelling   - Speech Change - Double Vision   - Dizzy when standing   - Focal Weakness - Eye Redness       - Seizures - Dry Eyes       - Loss of Consciousness          Endocrine  Musculoskeletal  Psychiatric   - Cold intolerance - Muscle Pain - Depression   - Heat intolerance - Neck Pain - Insomnia   - Anemia - Joint Pain - Memory Loss   -  Easy bruising, bleeding - Heel pain - Anxiety      Toe Pain        Leg/Ankle/Foot Pain         Objective:     LMP 08/29/2024 (Exact Date)     Physical Exam    Neck:  Trachea Midline  No visible masses    Respiratory/Ears:  No distress or labored breathing.  Able to differentiate between normal talking voice and whisper.  Able to follow commands    Eyes:  Visual Acuity intact  No discoloration noted.    Physical Exam  Ortho Exam  Foot  Exam    R LE exam con't:  V: DP 2/4, PT 2/4, CRT< 3s to all digits tested.    N: SILT in SP/DP/T/Hunter/Saph distributions    Ortho: +Motor EHL/FHL/TA/GA   Surgical site pain present as expected   Surgical site swelling present and mild   Compartments soft/compressible. No pain on passive stretch of big toe. No calf  Pain.     Derm: Skin intact. Sutures/staples: intact. Signs of infection: none.     Imaging / Labs:    Lab Results   Component Value Date    WBC 10.15 08/16/2024    WBC 8.93 01/23/2024    WBC 9.38 04/20/2023    WBC 8.20 05/03/2022    WBC 9.57 02/21/2022    PREALBUMIN 14 (L) 08/16/2024       Lab Results   Component Value Date    PREALBUMIN 14 (L) 08/16/2024       SURG FL Surgery Fluoro Usage    Result Date: 9/13/2024  See OP Notes for results. IMPRESSION: See OP Notes for results. This procedure was auto-finalized by: Virtual Radiologist

## 2024-10-09 NOTE — PATIENT INSTRUCTIONS
R.I.C.E.    R.I.C.E. stands for Rest, Ice, Compression, and Elevation. Doing these things helps limit pain and swelling after an injury. R.I.C.E. also helps injuries heal faster. Use R.I.C.E. for sprains, strains, and severe bruises or bumps. Follow the tips on this handout and begin R.I.C.E. as soon as possible after an injury.     Rest  Pain is your body's way of telling you to rest an injured area. Whether you have hurt an elbow, hand, foot, or knee, limiting its use will prevent further injury and help you heal.     Ice  Applying ice right after an injury helps prevent swelling and reduce pain. Don't place ice directly on your skin.  Wrap a cold pack or bag of ice in a thin cloth. Place it over the injured area.  Ice for 10 minutes every 3 hours. Don't ice for more than 20 minutes at a time.     Compression  Putting pressure (compression) on an injury helps prevent swelling and provides support.  Wrap the injured area firmly with an elastic bandage. If your hand or foot tingles, becomes discolored, or feels cold to the touch, the bandage may be too tight. Rewrap it more loosely.  If your bandage becomes too loose, rewrap it.  Do not wear an elastic bandage overnight.    Elevation  Keeping an injury elevated helps reduce swelling, pain, and throbbing. Elevation is most effective when the injury is kept elevated higher than the heart.     Call your healthcare provider if you notice any of the following:  Fingers or toes feel numb, are cold to the touch, or change color  Skin looks shiny or tight  Pain, swelling, or bruising worsens and is not improved with elevation       Foot & Ankle Arthroscopy  Whether you're taking a step or raising your hand, your joints help you move freely. But living with a worn or injured joint can make an active lifestyle painful. Arthroscopy can be used to visualize, diagnose and, in many cases, treat your joint problem. After arthroscopy, you may be able to return to many of the  activities you once enjoyed.       During arthroscopy,  sterile fluid flows through one of the portals. This expands the joint,  giving your surgeon the ability to see and work.   Why arthroscopy?  The surgeon can often find and treat the problem during one procedure.  The surgeon can often see the joint better than with open surgery.  Smaller incisions are used than with open surgery. As a result, you may recover faster and have less scarring.  How arthroscopy works  To look inside your joint, your surgeon will use an arthroscope. This is a slender instrument about the size of a pencil that contains a lens and a light source. The arthroscope and other special tools are inserted into the joint through tiny incisions. Using a camera, the arthroscope sends an image of your joint to a monitor. This lets your surgeon see your joint more clearly.  Risks of arthroscopy  As with any surgery, arthroscopy involves some risks. These are rare, but include:  Excess bleeding  Blood clots  Infection  Instrument failure in surgery  Damage to nerves and blood vessels  A shift to open surgery that would require a larger incision     This information is not intended as a substitute for professional medical care. Always follow your healthcare professional's instructions.      Ankle Arthroscopy: Conditions Treated  Arthroscopy is used to find and treat many types of ankle problems. These include loose bodies, bone spurs, osteochondritis dissecans (OCD), and synovitis.      Loose bodies  Loose bodies are bone or cartilage fragments that have chipped off inside the joint. If left in place, they can damage the joint surface and restrict ankle movement. Your surgeon can remove loose bodies from the joint. This will help restore normal, smooth ankle motion.      Bone spurs  When the bones in a joint pinch each other, they are impinged. This problem is often caused by bone spurs (growths) that have formed on the joint. Pressure from the spur  may cause pain when you move your ankle. Your surgeon will remove the spur and smooth the bone surface.      OCD  Because of an injury to its blood supply, a piece of bone can become loose inside the joint. It can occur from trauma, or without any obvious cause. Called OCD (osteochondritis dissecans), this problem can cause pain and swelling. The surgeon can remove the bone or secure it in place. Open surgery may also be needed. If the bone piece is removed, your surgeon may try to stimulate healing by drilling or by putting holes into the remaining exposed bone.       Synovitis  If the lining of the joint (synovium) is pinched, it may become inflamed. This can cause pain and swelling in the ankle. The surgeon can remove the pinched synovium and relieve symptoms.    This information is not intended as a substitute for professional medical care. Always follow your healthcare professional's instructions.

## 2024-10-15 ENCOUNTER — CLINICAL SUPPORT (OUTPATIENT)
Dept: REHABILITATION | Facility: HOSPITAL | Age: 29
End: 2024-10-15
Attending: PODIATRIST
Payer: MEDICAID

## 2024-10-15 DIAGNOSIS — Z47.89 AFTERCARE FOLLOWING SURGERY OF THE MUSCULOSKELETAL SYSTEM: ICD-10-CM

## 2024-10-15 DIAGNOSIS — M89.9 OSTEOCHONDRAL LESION OF TALAR DOME: ICD-10-CM

## 2024-10-15 DIAGNOSIS — M94.9 OSTEOCHONDRAL LESION OF TALAR DOME: ICD-10-CM

## 2024-10-15 PROCEDURE — 97161 PT EVAL LOW COMPLEX 20 MIN: CPT | Mod: PN

## 2024-10-15 PROCEDURE — 97112 NEUROMUSCULAR REEDUCATION: CPT | Mod: PN

## 2024-10-15 PROCEDURE — 97110 THERAPEUTIC EXERCISES: CPT | Mod: PN

## 2024-10-15 NOTE — PLAN OF CARE
[facility]   Physical Therapy Initial Evaluation      Name: Julieth Randhawa  Clinic Number: [id number]    Therapy Diagnosis:   Encounter Diagnoses   Name Primary?    Aftercare following surgery of the musculoskeletal system     Osteochondral lesion of talar dome         Physician: Roosevelt Orozco Jr., *    Physician Orders: PT Eval and Treat   Medical Diagnosis from Referral: aftercare following surgery of the musculoskeletal system; osteochondral lesion of talar dome, 09/13/2024  Evaluation Date: 10/15/2024  Authorization Period Expiration: 10/09/2025  Plan of Care Expiration: 12/10/2024  Progress Note Due: 11/16/2024  Date of Surgery: 09/13/2024  Visit #  1/12   Visits authorized: 1/ 1   FOTO: 1/ 3    Precautions: Standard and Fall     Time In: 1115  Time Out: 1205  Total Billable Time: 50 minutes    Subjective     Date of onset: 09/13/2024    History of current condition - Julieth reports: weakness, stiffness in the left foot/ankle following arthroscopic surgery; chronic 10 year history of pain and dysfunction in the right foot ankle, repeated misdiagnoses and treatment; recently discovered an old fracture of the talus in the right foot and subsequent breakdown of the cartilage.    Falls: 0    Imaging: CT scan films: CT ANKLE (INCLUDING HINDFOOT) WITHOUT CONTRAST RIGHT     CLINICAL HISTORY:  Ankle pain, chronic, osteoarthritis suspected;  Pain in right ankle and joints of right foot     TECHNIQUE:  1.25 mm contiguous axial images are performed through the right ankle with multiplanar reconstruction     COMPARISON:  None     FINDINGS:  CT scans right ankle:     Scans of the right ankle show anatomic alignment with mild degenerative changes at the tibial talar and subtalar articulations.  There are findings of avascular necrosis along the lateral aspect of talar dome.  No acute bony or soft tissue abnormality is identified.     Impression:     1.  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 with associated mild degenerative changes        Electronically signed by:Roosevelt Copeland MD  Date:                                            08/05/2024    Prior Therapy: yes, this clinic, for the foot/ankle  Social History:  lives with their spouse  Occupation:   Prior Level of Function: modified independence at the community level  Current Level of Function: modified independence at the community level    Pain:  Current 1/10, worst 8/10, best 0/10   Location: right ankle and foot    Description: Aching, Dull, and Tight  Aggravating Factors: Extension and Flexing  Easing Factors: ice and rest    Patients goals: regain full use and walk without pain      Medical History:   Past Medical History:   Diagnosis Date    Endometriosis     Heart attack 2019    silent heart attack, cardiac visits every 6 mths; told no issue until older    Osteochondral lesion of talar dome     right foot    Syncope and collapse     one time episode a couple of years ago    Synovitis of right ankle        Surgical History:   Julieth Randhawa  has a past surgical history that includes Tonsillectomy; [place] tooth extraction (2022); arthroscopy, ankle, with drilling and talus osteochondral defect excision (Right, 9/13/2024); and Bone marrow aspiration (Right, 9/13/2024).    Medications:   Julieth has a current medication list which includes the following prescription(s): ascorbic acid (vitamin c), aspirin, bupropion, cariprazine hcl, clonazepam, gabapentin, meloxicam, ondansetron, prazosin, and tramadol-acetaminophen 37.5-325 mg.    Allergies:   Review of patient's allergies indicates:   Allergen Reactions    Turmeric Anaphylaxis and Other (See Comments)        Objective        ACTIVE RANGE OF MOTION    LEFT RIGHT   Dorsiflexion (gastroc) normal -8 degrees   Dorsiflexion (soleus) normal -8 degrees   Plantarflexion normal normal   Inversion normal normal   Eversion normal  normal   PASSIVE RANGE OF  MOTION   Dorsiflexion (gastroc)  4 degrees   Dorsiflexion (soleus)     Plantarflexion     Inversion     Eversion       LOWER EXTREMITY STRENGTH    Left Right   Knee Extension 5/5 5/5   Knee Flexion 5/5 5/5     Hip Flexion 5/5 5/5   Hip Abduction 5/5 5/5   Hip Extension 5/5 5/5   Hip ER 5/5 5/5   Hip IR 5/5 5/5   Ankle Dorsiflexion 5/5 4/5   Ankle Plantarflexion (single heel raise) 5/5 4/5   Ankle Inversion 5/5 4/5   Ankle Eversion 5/5 4/5     FLEXIBILITY   Hamstring  normal   Prone Quadriceps normal   Hip Extension normal   Hip Internal Rotation normal   Hip External Rotation normal   Piriformis normal   Dorsiflexion As above     DTR's:   Left Right   Patella Tendon 1+ 1+   Achilles Tendon 1+ 1+     DERMATOMES: Sensation: Light Touch: Impaired: L5 for the second toe and the medial aspect of the third toe  MYOTOMES:       PALPATION:  Patient reports no significant primary pain region, just soreness occasionally    GAIT: Julieth is non-weight bearing for 3 more weeks; using a knee scooter  with .     GAIT DEVIATIONS: Julieth displays no deviations at this time secondary to non-weight bearing for 3 more weeks    Squat Mechanics: NA      Intake Outcome Measure for FOTO lower extremity Survey    Therapist reviewed FOTO scores for Julieth Randhawa on 10/15/2024.   FOTO report - see Media section or FOTO account episode details.  Patients intake functional measure is 15 on a scale approximating 0 - 100 (higher number = greater  function).This FS measure places the patient in Stage 1 and means the patient has restricted  ambulation.  Intake Score: 15%         Treatment     Total Treatment time (time-based codes) separate from Evaluation: 20 minutes     Julieth received the treatments listed below:      therapeutic exercises to develop strength, ROM, and flexibility for 15 minutes including:  Theraband strengthening: 10 reps each, cardinal planes, red and green  Ankle boards: 1 minute each, plantar/dorsi-flexion,  inversion/eversion, clockwise/counterclockwise  Single foot grapevie/karaoke, left and right, 1 minute x2    manual therapy techniques:  were applied to the:  for  minutes, including:    neuromuscular re-education activities to improve: Balance, Coordination, and Proprioception for 000 minutes. The following activities were included:    therapeutic activities to improve functional performance for   minutes, including:    gait training to improve functional mobility and safety for   minutes, including:  NA at this time secondary to NWB status for 3 more weeks    direct contact modalities after being cleared for contraindications:     supervised modalities after being cleared for contradictions:     hot pack for 000 minutes to ---.    cold pack for 5 minutes to right foot/ankle.    Patient Education and Home Exercises     Education provided:   - HEP, edema control    Written Home Exercises Provided: yes. Exercises were reviewed and Julieth was able to demonstrate them prior to the end of the session.  Julieth demonstrated good  understanding of the education provided. See EMR under Patient Instructions for exercises provided during therapy sessions.    Assessment     Julieth is a 29 y.o. female referred to outpatient Physical Therapy with a medical diagnosis of aftercare following surgery of the musculoskeletal system; osteochondral lesion of talar dome, 09/13/2024. Patient presents with NWB status for 3 more weeks, ace bandage to protect, 3 arthroscopic incisions (healing), excellent AROM except for dorsiflexion, mild weakness.    Patient prognosis is Excellent.   Patient will benefit from skilled outpatient Physical Therapy to address the deficits stated above and in the chart below, provide patient /family education, and to maximize patientt's level of independence.     Plan of care discussed with patient: Yes  Patient's spiritual, cultural and educational needs considered and patient is agreeable to the plan of care and  goals as stated below:     Anticipated Barriers for therapy: chronic nature/condition    Medical Necessity is demonstrated by the following  History  Co-morbidities and personal factors that may impact the plan of care [x] LOW: no personal factors / co-morbidities  [] MODERATE: 1-2 personal factors / co-morbidities  [] HIGH: 3+ personal factors / co-morbidities    Moderate / High Support Documentation:   Co-morbidities affecting plan of care:     Personal Factors:   Morbid obesity     Examination  Body Structures and Functions, activity limitations and participation restrictions that may impact the plan of care [] LOW: addressing 1-2 elements  [] MODERATE: 3+ elements  [] HIGH: 4+ elements (please support below)    Moderate / High Support Documentation:      Clinical Presentation [] LOW: stable  [] MODERATE: Evolving  [] HIGH: Unstable     Decision Making/ Complexity Score: low       Goals:  Short Term Goals (3 Weeks):  1. Patient will be compliant with home exercise program to supplement therapy in restoring pain free function.  2. Patient will improve impaired lower extremity manual muscle tests to >/= 4+/5 to improve dynamic right ankle/support for functional tasks.    Long Term Goals (6 Weeks):  1. Patient will improve FOTO score to </= 50% limited to decrease perceived limitation with mobility.   2. Patient will improve impaired lower extremity manual muscle tests to >/= 5-/5 to improve dynamic right ankle/support for functional tasks.    Plan     Plan of care Certification: 10/15/2024 to 12/10/2024.    Outpatient Physical Therapy 2 times weekly for 6 weeks to include the following interventions: Gait Training, Manual Therapy, Moist Heat/ Ice, Neuromuscular Re-ed, Patient Education, Therapeutic Activities, and Therapeutic Exercise.     Khadar Bowers PT        Physician's Signature: _________________________________________ Date: ________________

## 2024-10-22 ENCOUNTER — CLINICAL SUPPORT (OUTPATIENT)
Dept: REHABILITATION | Facility: HOSPITAL | Age: 29
End: 2024-10-22
Payer: MEDICAID

## 2024-10-22 DIAGNOSIS — M89.9 OSTEOCHONDRAL LESION OF TALAR DOME: Primary | ICD-10-CM

## 2024-10-22 DIAGNOSIS — M65.971 SYNOVITIS OF RIGHT ANKLE: ICD-10-CM

## 2024-10-22 DIAGNOSIS — M94.9 OSTEOCHONDRAL LESION OF TALAR DOME: Primary | ICD-10-CM

## 2024-10-22 PROCEDURE — 97110 THERAPEUTIC EXERCISES: CPT | Mod: PN

## 2024-10-22 NOTE — PROGRESS NOTES
[facility]   Physical Therapy Treatment Note      Name: Julieth Randhawa  Clinic Number: [id number]    Therapy Diagnosis:   Encounter Diagnoses   Name Primary?    Osteochondral lesion of talar dome Yes    Synovitis of right ankle      Physician: Roosevelt Orozco Jr., *    Visit Date: 10/22/2024    Physician Orders: PT Eval and Treat   Medical Diagnosis from Referral: aftercare following surgery of the musculoskeletal system; osteochondral lesion of talar dome, 09/13/2024  Evaluation Date: 10/15/2024  Authorization Period Expiration: 10/09/2025  Plan of Care Expiration: 12/10/2024  Progress Note Due: 11/16/2024  Date of Surgery: 09/13/2024  Visit #  1/12   Visits authorized: 1/ 1   FOTO: 1/ 3     Precautions: Standard and Fall; NWB until 10-30-24     Time In: 1345  Time Out: 1425  Total Billable Time: 40 minutes    PTA Visit #: -/5       Subjective     Patient reports: she has been feeling better since her last visit.  She was compliant with home exercise program.  Response to previous treatment: positive  Functional change: none    Pain: 4/10  Location: right feet      Objective      Objective Measures updated at progress report unless specified.     Treatment     Julieth received the treatments listed below:      therapeutic exercises to develop strength, endurance, and ROM for 25 minutes including:  -Theraband strengthening: 2x10 reps each, cardinal planes, red   -Ankle boards: 3 minute each, plantar/dorsi-flexion, inversion/eversion  -Single foot grapevie/karaoke, left and right, 1 minute x2  -towel curls x 3 min  -gastroc stretch 10 x 10 seconds with towel    manual therapy techniques: Joint mobilizations were applied to the: right foot for 8 minutes, including:  -PROM, joint mobs, scar mobs    neuromuscular re-education activities to improve:  for  minutes. The following activities were included:      therapeutic activities to improve functional performance for   minutes,  including:      gait training to improve functional mobility and safety for   minutes, including:      direct contact modalities after being cleared for contraindications:     supervised modalities after being cleared for contradictions:     hot pack for  minutes to .    cold pack for  minutes to .    Patient Education and Home Exercises       Education provided:   - Cont HEP    Written Home Exercises Provided: Patient instructed to cont prior HEP. Exercises were reviewed and Julieth was able to demonstrate them prior to the end of the session.  Julieth demonstrated good  understanding of the education provided. See Electronic Medical Record under Patient Instructions for exercises provided during therapy sessions    Assessment     Patient tolerated today's session well. She reports that she has been getting around with her scooter NWB.    Jluieth Is progressing well towards her goals.   Patient prognosis is Good.     Patient will continue to benefit from skilled outpatient physical therapy to address the deficits listed in the problem list box on initial evaluation, provide pt/family education and to maximize pt's level of independence in the home and community environment.     Patient's spiritual, cultural and educational needs considered and pt agreeable to plan of care and goals.     Anticipated barriers to physical therapy: none    Goals: Short Term Goals (3 Weeks):  1. Patient will be compliant with home exercise program to supplement therapy in restoring pain free function.  2. Patient will improve impaired lower extremity manual muscle tests to >/= 4+/5 to improve dynamic right ankle/support for functional tasks.     Long Term Goals (6 Weeks):  1. Patient will improve FOTO score to </= 50% limited to decrease perceived limitation with mobility.   2. Patient will improve impaired lower extremity manual muscle tests to >/= 5-/5 to improve dynamic right ankle/support for functional tasks.     Plan      Plan of care  Certification: 10/15/2024 to 12/10/2024.     Outpatient Physical Therapy 2 times weekly for 6 weeks to include the following interventions: Gait Training, Manual Therapy, Moist Heat/ Ice, Neuromuscular Re-ed, Patient Education, Therapeutic Activities, and Therapeutic Exercise.       Olena Clement, PT

## 2024-10-24 ENCOUNTER — TELEPHONE (OUTPATIENT)
Dept: PODIATRY | Facility: CLINIC | Age: 29
End: 2024-10-24
Payer: MEDICAID

## 2024-10-24 ENCOUNTER — CLINICAL SUPPORT (OUTPATIENT)
Dept: REHABILITATION | Facility: HOSPITAL | Age: 29
End: 2024-10-24
Payer: MEDICAID

## 2024-10-24 DIAGNOSIS — M65.971 SYNOVITIS OF RIGHT ANKLE: ICD-10-CM

## 2024-10-24 DIAGNOSIS — M89.9 OSTEOCHONDRAL LESION OF TALAR DOME: Primary | ICD-10-CM

## 2024-10-24 DIAGNOSIS — M94.9 OSTEOCHONDRAL LESION OF TALAR DOME: Primary | ICD-10-CM

## 2024-10-24 PROCEDURE — 97110 THERAPEUTIC EXERCISES: CPT | Mod: PN

## 2024-10-24 NOTE — TELEPHONE ENCOUNTER
Spoke to [facility] in [place], they wanted to know can they begin weight bearing in PT, informed her per  last note is states--  wb: nwb RLE and will access at next appt on 10/30.

## 2024-10-24 NOTE — PROGRESS NOTES
[facility]   Physical Therapy Treatment Note      Name: Julieth Randhawa  Clinic Number: [id number]    Therapy Diagnosis:   Encounter Diagnoses   Name Primary?    Osteochondral lesion of talar dome Yes    Synovitis of right ankle        Physician: Roosevelt Orozco Jr., *    Visit Date: 10/24/2024    Physician Orders: PT Eval and Treat   Medical Diagnosis from Referral: aftercare following surgery of the musculoskeletal system; osteochondral lesion of talar dome, 09/13/2024  Evaluation Date: 10/15/2024  Authorization Period Expiration: 10/09/2025  Plan of Care Expiration: 12/10/2024  Progress Note Due: 11/16/2024  Date of Surgery: 09/13/2024  Visit #  2/12   FOTO: 1/ 3     Precautions: Standard and Fall; NWB until 10-30-24     Time In: 1140  Time Out: 1225  Total Billable Time: 45 minutes    PTA Visit #: -/5       Subjective     Patient reports: questioning if she is able to weightbear. Unsuccessful with communication to MD to change status   She was compliant with home exercise program.  Response to previous treatment: positive  Functional change: none    Pain: 4/10  Location: right feet      Objective      Objective Measures updated at progress report unless specified.     Treatment     Julieth received the treatments listed below:      therapeutic exercises to develop strength, endurance, and ROM for 45 minutes including:  -lower extremity bike at level 3 x 7 min   -Theraband strengthening: dorsiflexion, plantar flexion, ankle circles, inversion, eversion - all 2x10 reps each, green theraband   - straight leg raise with 2# 2 x 10   - sidelying hip abduction with 2 # 2 x 10   -Ankle boards: 3 minute each, plantar/dorsi-flexion, inversion/eversion  - LAQ with ankle pump and 2# 2 x 10   -towel curls x 3 min  -manual therapy techniques: Joint mobilizations were applied to the: right foot for 8 minutes, including: PROM, joint mobs, scar mobs    NOT PERFORMED ON THIS DATE:   -Single foot  no/xavier, left and right, 1 minute x2 (seated)   -gastroc stretch 10 x 10 seconds with towel    Patient Education and Home Exercises       Education provided:   - Cont home exercise program  - Spoke with nurse who advised to maintain NWBing until seen by MD.   - Educated why orthotics may cause more pain     Written Home Exercises Provided: Patient instructed to cont prior HEP. Exercises were reviewed and Julieth was able to demonstrate them prior to the end of the session.  Julieth demonstrated good  understanding of the education provided. See Electronic Medical Record under Patient Instructions for exercises provided during therapy sessions    Assessment     Patient tolerated today's session well. She continues to have significant dry skin noted.     Julieth Is progressing well towards her goals.   Patient prognosis is Good.     Patient will continue to benefit from skilled outpatient physical therapy to address the deficits listed in the problem list box on initial evaluation, provide pt/family education and to maximize pt's level of independence in the home and community environment.     Patient's spiritual, cultural and educational needs considered and pt agreeable to plan of care and goals.     Anticipated barriers to physical therapy: none    Goals: Short Term Goals (3 Weeks):  1. Patient will be compliant with home exercise program to supplement therapy in restoring pain free function.  2. Patient will improve impaired lower extremity manual muscle tests to >/= 4+/5 to improve dynamic right ankle/support for functional tasks.     Long Term Goals (6 Weeks):  1. Patient will improve FOTO score to </= 50% limited to decrease perceived limitation with mobility.   2. Patient will improve impaired lower extremity manual muscle tests to >/= 5-/5 to improve dynamic right ankle/support for functional tasks.     Plan      Plan of care Certification: 10/15/2024 to 12/10/2024.     Outpatient Physical Therapy 2 times  weekly for 6 weeks to include the following interventions: Gait Training, Manual Therapy, Moist Heat/ Ice, Neuromuscular Re-ed, Patient Education, Therapeutic Activities, and Therapeutic Exercise.       Soraya Modi, PT

## 2024-10-24 NOTE — TELEPHONE ENCOUNTER
----- Message from Emily sent at 10/24/2024 11:54 AM CDT -----  Regarding: call  Type:  Call     Who Called: [facility] in [place]   Would the patient rather a call back or a response via [facility]? Call   Best Call Back Number: [phone]  Additional Information: would like a call back in regards to pt , would like to speak to someone in office

## 2024-10-30 ENCOUNTER — OFFICE VISIT (OUTPATIENT)
Dept: PODIATRY | Facility: CLINIC | Age: 29
End: 2024-10-30
Payer: MEDICAID

## 2024-10-30 DIAGNOSIS — Z47.89 AFTERCARE FOLLOWING SURGERY OF THE MUSCULOSKELETAL SYSTEM: Primary | ICD-10-CM

## 2024-10-30 PROCEDURE — 3044F HG A1C LEVEL LT 7.0%: CPT | Mod: CPTII,,, | Performed by: PODIATRIST

## 2024-10-30 PROCEDURE — 99024 POSTOP FOLLOW-UP VISIT: CPT | Mod: ,,, | Performed by: PODIATRIST

## 2024-10-30 PROCEDURE — 1159F MED LIST DOCD IN RCRD: CPT | Mod: CPTII,,, | Performed by: PODIATRIST

## 2024-10-30 PROCEDURE — 99999 PR PBB SHADOW E&M-EST. PATIENT-LVL III: CPT | Mod: PBBFAC,,, | Performed by: PODIATRIST

## 2024-10-30 PROCEDURE — 99213 OFFICE O/P EST LOW 20 MIN: CPT | Mod: PBBFAC,PN | Performed by: PODIATRIST

## 2024-11-06 ENCOUNTER — CLINICAL SUPPORT (OUTPATIENT)
Dept: REHABILITATION | Facility: HOSPITAL | Age: 29
End: 2024-11-06
Payer: MEDICAID

## 2024-11-06 DIAGNOSIS — Z47.89 AFTERCARE FOLLOWING SURGERY OF THE MUSCULOSKELETAL SYSTEM: Primary | ICD-10-CM

## 2024-11-06 PROCEDURE — 97110 THERAPEUTIC EXERCISES: CPT | Mod: PN

## 2024-11-06 PROCEDURE — 97112 NEUROMUSCULAR REEDUCATION: CPT | Mod: PN

## 2024-11-06 PROCEDURE — 97140 MANUAL THERAPY 1/> REGIONS: CPT | Mod: PN

## 2024-11-06 NOTE — PROGRESS NOTES
"[facility]   Physical Therapy Treatment Note      Name: Julieth Randhawa  Clinic Number: [id number]    Therapy Diagnosis:   No diagnosis found.      Physician: Roosevelt Orozco Jr., *    Visit Date: 11/6/2024    Physician Orders: PT Eval and Treat   Medical Diagnosis from Referral: aftercare following surgery of the musculoskeletal system; osteochondral lesion of talar dome, 09/13/2024  Evaluation Date: 10/15/2024  Authorization Period Expiration: 10/09/2025  Plan of Care Expiration: 12/10/2024  Progress Note Due: 11/16/2024  Date of Surgery: 09/13/2024  Visit #  5/12   FOTO: 1/ 3     Precautions: Standard and Fall; NWB until 10-30-24     Time In: 1140  Time Out: 1225  Total Billable Time: 45 minutes    PTA Visit #: -/5       Subjective     Patient reports: Saw her MD; permission to begin weight bearing today as well as walking; permission to begin driving; Denies pain with weight bearing but claims it feels "different"   She was compliant with home exercise program.  Response to previous treatment: positive  Functional change: none    Pain: 0/10  Location: right feet      Objective       Julieth demonstrates the ability to bear 75%+ of her weight without adversity.           ACTIVE RANGE OF MOTION     LEFT RIGHT   Dorsiflexion (gastroc) normal -8 degrees   Dorsiflexion (soleus) normal -8 degrees   Plantarflexion normal normal   Inversion normal normal   Eversion normal  normal   PASSIVE RANGE OF MOTION   Dorsiflexion (gastroc)   4 degrees   Dorsiflexion (soleus)       Plantarflexion       Inversion       Eversion               LOWER EXTREMITY STRENGTH     Left Right   Knee Extension 5/5 5/5   Knee Flexion 5/5 5/5      Hip Flexion 5/5 5/5   Hip Abduction 5/5 5/5   Hip Extension 5/5 5/5   Hip ER 5/5 5/5   Hip IR 5/5 5/5   Ankle Dorsiflexion 5/5 4/5   Ankle Plantarflexion (single heel raise) 5/5 4/5   Ankle Inversion 5/5 4/5   Ankle Eversion 5/5 4/5          FLEXIBILITY   Hamstring  " normal   Prone Quadriceps normal   Hip Extension normal   Hip Internal Rotation normal   Hip External Rotation normal   Piriformis normal   Dorsiflexion As above      DTR's:    Left Right   Patella Tendon 1+ 1+   Achilles Tendon 1+ 1+      DERMATOMES: Sensation: Light Touch: Impaired: L5 for the second toe and the medial aspect of the third toe  MYOTOMES:         PALPATION:  Patient reports no significant primary pain region, just soreness occasionally     GAIT: Julieth began weight bearing and gait today, 11/06/24 as requested by her MD.     GAIT DEVIATIONS: Julieth displays no deviations at this time secondary to non-weight bearing for 3 more weeks     Squat Mechanics: NA        Intake Outcome Measure for FOTO lower extremity Survey     Therapist reviewed FOTO scores for Julieth Randhawa on 10/15/2024.   FOTO report - see Media section or FOTO account episode details.  Patients intake functional measure is 15 on a scale approximating 0 - 100 (higher number = greater  function).This FS measure places the patient in Stage 1 and means the patient has restricted  ambulation.  Intake Score: 15%            Treatment     Julieth received the treatments listed below:      Moist heat x10 minutes to the entire lower leg, pre-exercise  Ice x10 minutes to the ankle, post exercise    therapeutic exercises to develop strength, endurance, and ROM for 45 minutes including:  lower extremity bike at level 3 x 7 min   straight leg raise with 2# 2 x 10   sidelying hip abduction with 2 # 2 x 10   Ankle boards: 3 minute each, plantar/dorsi-flexion, inversion/eversion, clockwise/counter clockwise circumduction  LAQ with ankle pump and 2# 2 x 10   towel curls x 3 min  manual therapy techniques: Joint mobilizations were applied to the: right foot for 8 minutes, including: PROM, joint mobs, scar mobs    GAIT TRAINING: stocking feet for spinal/hip alignment, 15 feet with multiple turns x 4 reps, swing thru/step thru gait pattern    Patient  Education and Home Exercises       Education provided:   Continue home exercise program.   Educated on using best supportive shoes possible for weight bearing and gait  Recommended purchasing a [place] adjustable ankle support for added support and comfort outside of best case shoes used    Written Home Exercises Provided: Patient instructed to cont prior HEP. Exercises were reviewed and Julieth was able to demonstrate them prior to the end of the session.  Julieth demonstrated good  understanding of the education provided. See Electronic Medical Record under Patient Instructions for exercises provided during therapy sessions    Assessment     Patient tolerated today's session well. Obviously very eager to get rid of the scooter and boot but understands that her transition to weight bearing and walking has to be gradual and purposefully guarded. Gait pattern appears pain-free and normal considering;    Julieth Is progressing well towards her goals.   Patient prognosis is Good.     Patient will continue to benefit from skilled outpatient physical therapy to address the deficits listed in the problem list box on initial evaluation, provide pt/family education and to maximize pt's level of independence in the home and community environment.     Patient's spiritual, cultural and educational needs considered and pt agreeable to plan of care and goals.     Anticipated barriers to physical therapy: none    Goals: Short Term Goals (3 Weeks):  1. Patient will be compliant with home exercise program to supplement therapy in restoring pain free function.  2. Patient will improve impaired lower extremity manual muscle tests to >/= 4+/5 to improve dynamic right ankle/support for functional tasks.     Long Term Goals (6 Weeks):  1. Patient will improve FOTO score to </= 50% limited to decrease perceived limitation with mobility.   2. Patient will improve impaired lower extremity manual muscle tests to >/= 5-/5 to improve dynamic right  ankle/support for functional tasks.     Plan      Plan of care Certification: 10/15/2024 to 12/10/2024.     Outpatient Physical Therapy 2 times weekly for 6 weeks to include the following interventions: Gait Training, Manual Therapy, Moist Heat/ Ice, Neuromuscular Re-ed, Patient Education, Therapeutic Activities, and Therapeutic Exercise.       Khadar Bowers, PT

## 2024-11-08 ENCOUNTER — CLINICAL SUPPORT (OUTPATIENT)
Dept: REHABILITATION | Facility: HOSPITAL | Age: 29
End: 2024-11-08
Payer: MEDICAID

## 2024-11-08 DIAGNOSIS — M94.9 OSTEOCHONDRAL LESION OF TALAR DOME: Primary | ICD-10-CM

## 2024-11-08 DIAGNOSIS — M65.971 SYNOVITIS OF RIGHT ANKLE: ICD-10-CM

## 2024-11-08 DIAGNOSIS — M89.9 OSTEOCHONDRAL LESION OF TALAR DOME: Primary | ICD-10-CM

## 2024-11-08 PROCEDURE — 97110 THERAPEUTIC EXERCISES: CPT | Mod: PN

## 2024-11-08 NOTE — PROGRESS NOTES
"[facility]   Physical Therapy Treatment Note      Name: Julieth Randhawa  Clinic Number: [id number]    Therapy Diagnosis:   Encounter Diagnoses   Name Primary?    Osteochondral lesion of talar dome Yes    Synovitis of right ankle          Physician: Roosevelt Orozco Jr., *    Visit Date: 11/8/2024    Physician Orders: PT Eval and Treat   Medical Diagnosis from Referral: aftercare following surgery of the musculoskeletal system; osteochondral lesion of talar dome, 09/13/2024  Evaluation Date: 10/15/2024  Authorization Period Expiration: 10/09/2025  Plan of Care Expiration: 12/10/2024  Progress Note Due: 11/16/2024  Date of Surgery: 09/13/2024  Visit #  5/12   FOTO: 1/ 3     Precautions: Standard and Fall; NWB until 10-30-24     Time In: 1140  Time Out: 1225  Total Billable Time: 45 minutes    PTA Visit #: -/5       Subjective     Patient reports: Saw her MD; permission to begin weight bearing today as well as walking; permission to begin driving; Denies pain with weight bearing but claims it feels "different"   She was compliant with home exercise program.  Response to previous treatment: positive  Functional change: none    Pain: 0/10  Location: right feet      Objective       Julieth demonstrates the ability to bear 75%+ of her weight without adversity.           ACTIVE RANGE OF MOTION     LEFT RIGHT   Dorsiflexion (gastroc) normal -8 degrees   Dorsiflexion (soleus) normal -8 degrees   Plantarflexion normal normal   Inversion normal normal   Eversion normal  normal   PASSIVE RANGE OF MOTION   Dorsiflexion (gastroc)   4 degrees   Dorsiflexion (soleus)       Plantarflexion       Inversion       Eversion               LOWER EXTREMITY STRENGTH     Left Right   Knee Extension 5/5 5/5   Knee Flexion 5/5 5/5      Hip Flexion 5/5 5/5   Hip Abduction 5/5 5/5   Hip Extension 5/5 5/5   Hip ER 5/5 5/5   Hip IR 5/5 5/5   Ankle Dorsiflexion 5/5 4/5   Ankle Plantarflexion (single heel raise) 5/5 4/5 "   Ankle Inversion 5/5 4/5   Ankle Eversion 5/5 4/5          FLEXIBILITY   Hamstring  normal   Prone Quadriceps normal   Hip Extension normal   Hip Internal Rotation normal   Hip External Rotation normal   Piriformis normal   Dorsiflexion As above      DTR's:    Left Right   Patella Tendon 1+ 1+   Achilles Tendon 1+ 1+      DERMATOMES: Sensation: Light Touch: Impaired: L5 for the second toe and the medial aspect of the third toe  MYOTOMES:         PALPATION:  Patient reports no significant primary pain region, just soreness occasionally     GAIT: Julieth began weight bearing and gait today, 11/06/24 as requested by her MD.     GAIT DEVIATIONS: Julieth displays no deviations at this time secondary to non-weight bearing for 3 more weeks     Squat Mechanics: NA        Intake Outcome Measure for FOTO lower extremity Survey     Therapist reviewed FOTO scores for Julieth Randhawa on 10/15/2024.   FOTO report - see Media section or FOTO account episode details.  Patients intake functional measure is 15 on a scale approximating 0 - 100 (higher number = greater  function).This FS measure places the patient in Stage 1 and means the patient has restricted  ambulation.  Intake Score: 15%            Treatment     Julieth received the treatments listed below:      Moist heat x10 minutes to the entire lower leg, pre-exercise  Ice x10 minutes to the ankle, post exercise    therapeutic exercises to develop strength, endurance, and ROM for 45 minutes including:  lower extremity bike at level 3 x 7 min   straight leg raise with 2# 2 x 10   sidelying hip abduction with 2 # 2 x 10   Ankle boards: 3 minute each, plantar/dorsi-flexion, inversion/eversion, clockwise/counter clockwise circumduction  LAQ with ankle pump and 2# 2 x 10   towel curls x 3 min  manual therapy techniques: Joint mobilizations were applied to the: right foot for 8 minutes, including: PROM, joint mobs, scar mobs    GAIT TRAINING: stocking feet for spinal/hip alignment,  15 feet with multiple turns x 4 reps, swing thru/step thru gait pattern    Patient Education and Home Exercises       Education provided:   Continue home exercise program.   Educated on using best supportive shoes possible for weight bearing and gait  Recommended purchasing a [place] adjustable ankle support for added support and comfort outside of best case shoes used    Written Home Exercises Provided: Patient instructed to cont prior HEP. Exercises were reviewed and Julieth was able to demonstrate them prior to the end of the session.  Julieth demonstrated good  understanding of the education provided. See Electronic Medical Record under Patient Instructions for exercises provided during therapy sessions    Assessment     Patient tolerated today's session well. No increased pain with there ex.    Julieth Is progressing well towards her goals.   Patient prognosis is Good.     Patient will continue to benefit from skilled outpatient physical therapy to address the deficits listed in the problem list box on initial evaluation, provide pt/family education and to maximize pt's level of independence in the home and community environment.     Patient's spiritual, cultural and educational needs considered and pt agreeable to plan of care and goals.     Anticipated barriers to physical therapy: none    Goals: Short Term Goals (3 Weeks):  1. Patient will be compliant with home exercise program to supplement therapy in restoring pain free function.  2. Patient will improve impaired lower extremity manual muscle tests to >/= 4+/5 to improve dynamic right ankle/support for functional tasks.     Long Term Goals (6 Weeks):  1. Patient will improve FOTO score to </= 50% limited to decrease perceived limitation with mobility.   2. Patient will improve impaired lower extremity manual muscle tests to >/= 5-/5 to improve dynamic right ankle/support for functional tasks.     Plan      Plan of care Certification: 10/15/2024 to 12/10/2024.      Outpatient Physical Therapy 2 times weekly for 6 weeks to include the following interventions: Gait Training, Manual Therapy, Moist Heat/ Ice, Neuromuscular Re-ed, Patient Education, Therapeutic Activities, and Therapeutic Exercise.       Olena Clement, PT

## 2024-11-15 ENCOUNTER — CLINICAL SUPPORT (OUTPATIENT)
Dept: REHABILITATION | Facility: HOSPITAL | Age: 29
End: 2024-11-15
Payer: MEDICAID

## 2024-11-15 DIAGNOSIS — M65.971 SYNOVITIS OF RIGHT ANKLE: ICD-10-CM

## 2024-11-15 DIAGNOSIS — M89.9 OSTEOCHONDRAL LESION OF TALAR DOME: Primary | ICD-10-CM

## 2024-11-15 DIAGNOSIS — M94.9 OSTEOCHONDRAL LESION OF TALAR DOME: Primary | ICD-10-CM

## 2024-11-15 PROCEDURE — 97110 THERAPEUTIC EXERCISES: CPT | Mod: PN

## 2024-11-15 NOTE — PROGRESS NOTES
[facility]   Physical Therapy Treatment Note      Name: Julieth Randhawa  Clinic Number: [id number]    Therapy Diagnosis:   Encounter Diagnoses   Name Primary?    Osteochondral lesion of talar dome Yes    Synovitis of right ankle          Physician: Roosevelt Orozco Jr., *    Visit Date: 11/15/2024    Physician Orders: PT Eval and Treat   Medical Diagnosis from Referral: aftercare following surgery of the musculoskeletal system; osteochondral lesion of talar dome, 09/13/2024  Evaluation Date: 10/15/2024  Authorization Period Expiration: 10/09/2025  Plan of Care Expiration: 12/10/2024  Progress Note Due: 11/16/2024  Date of Surgery: 09/13/2024  Visit #  5/12   FOTO: 1/ 3     Precautions: Standard and Fall; NWB until 10-30-24     Time In: 1300  Time Out: 1340  Total Billable Time: 40 minutes    PTA Visit #: -/5       Subjective     Patient reports: She is having more pain and swelling today unsure it d/t weather changes.  She was compliant with home exercise program.  Response to previous treatment: positive  Functional change: none    Pain: 0/10  Location: right feet      Objective       Julieth demonstrates the ability to bear 75%+ of her weight without adversity.           ACTIVE RANGE OF MOTION     LEFT RIGHT   Dorsiflexion (gastroc) normal -8 degrees   Dorsiflexion (soleus) normal -8 degrees   Plantarflexion normal normal   Inversion normal normal   Eversion normal  normal   PASSIVE RANGE OF MOTION   Dorsiflexion (gastroc)   4 degrees   Dorsiflexion (soleus)       Plantarflexion       Inversion       Eversion               LOWER EXTREMITY STRENGTH     Left Right   Knee Extension 5/5 5/5   Knee Flexion 5/5 5/5      Hip Flexion 5/5 5/5   Hip Abduction 5/5 5/5   Hip Extension 5/5 5/5   Hip ER 5/5 5/5   Hip IR 5/5 5/5   Ankle Dorsiflexion 5/5 4/5   Ankle Plantarflexion (single heel raise) 5/5 4/5   Ankle Inversion 5/5 4/5   Ankle Eversion 5/5 4/5          FLEXIBILITY   Hamstring  normal    Prone Quadriceps normal   Hip Extension normal   Hip Internal Rotation normal   Hip External Rotation normal   Piriformis normal   Dorsiflexion As above      DTR's:    Left Right   Patella Tendon 1+ 1+   Achilles Tendon 1+ 1+      DERMATOMES: Sensation: Light Touch: Impaired: L5 for the second toe and the medial aspect of the third toe  MYOTOMES:         PALPATION:  Patient reports no significant primary pain region, just soreness occasionally     GAIT: Julieth began weight bearing and gait today, 11/06/24 as requested by her MD.     GAIT DEVIATIONS: Julieth displays no deviations at this time secondary to non-weight bearing for 3 more weeks     Squat Mechanics: NA        Intake Outcome Measure for FOTO lower extremity Survey     Therapist reviewed FOTO scores for Julieth Randhawa on 10/15/2024.   FOTO report - see Media section or FOTO account episode details.  Patients intake functional measure is 15 on a scale approximating 0 - 100 (higher number = greater  function).This FS measure places the patient in Stage 1 and means the patient has restricted  ambulation.  Intake Score: 15%            Treatment     Julieth received the treatments listed below:      Moist heat x10 minutes to the entire lower leg, pre-exercise  Ice x10 minutes to the ankle, post exercise    therapeutic exercises to develop strength, endurance, and ROM for 45 minutes including:    straight leg raise with 2# 2 x 10   sidelying hip abduction with 2 # 2 x 10   Ankle boards: 3 minute each, plantar/dorsi-flexion, inversion/eversion, clockwise/counter clockwise circumduction  LAQ with ankle pump and 2# 2 x 10   towel curls x 3 min  Not performed:  lower extremity bike at level 3 x 7 min   manual therapy techniques: Joint mobilizations were applied to the: right foot for 8 minutes, including: PROM, joint mobs, scar mobs        Patient Education and Home Exercises       Education provided:   Continue home exercise program.   Educated on using best  supportive shoes possible for weight bearing and gait  Recommended purchasing a [place] adjustable ankle support for added support and comfort outside of best case shoes used    Written Home Exercises Provided: Patient instructed to cont prior HEP. Exercises were reviewed and Julieth was able to demonstrate them prior to the end of the session.  Julieth demonstrated good  understanding of the education provided. See Electronic Medical Record under Patient Instructions for exercises provided during therapy sessions    Assessment     Patient tolerated today's session well. Held recumbent bike secondary to increased pain today.    Julieth Is progressing well towards her goals.   Patient prognosis is Good.     Patient will continue to benefit from skilled outpatient physical therapy to address the deficits listed in the problem list box on initial evaluation, provide pt/family education and to maximize pt's level of independence in the home and community environment.     Patient's spiritual, cultural and educational needs considered and pt agreeable to plan of care and goals.     Anticipated barriers to physical therapy: none    Goals: Short Term Goals (3 Weeks):  1. Patient will be compliant with home exercise program to supplement therapy in restoring pain free function.  2. Patient will improve impaired lower extremity manual muscle tests to >/= 4+/5 to improve dynamic right ankle/support for functional tasks.     Long Term Goals (6 Weeks):  1. Patient will improve FOTO score to </= 50% limited to decrease perceived limitation with mobility.   2. Patient will improve impaired lower extremity manual muscle tests to >/= 5-/5 to improve dynamic right ankle/support for functional tasks.     Plan      Plan of care Certification: 10/15/2024 to 12/10/2024.     Outpatient Physical Therapy 2 times weekly for 6 weeks to include the following interventions: Gait Training, Manual Therapy, Moist Heat/ Ice, Neuromuscular Re-ed, Patient  Education, Therapeutic Activities, and Therapeutic Exercise.       Olena Clement, PT

## 2024-11-20 ENCOUNTER — TELEPHONE (OUTPATIENT)
Dept: PODIATRY | Facility: CLINIC | Age: 29
End: 2024-11-20
Payer: MEDICAID

## 2024-11-20 NOTE — TELEPHONE ENCOUNTER
----- Message from Kimberly sent at 11/20/2024 11:10 AM CST -----  Appointment Request    Name of Caller:Julieth  Symptoms or reason for appointment:Reschedule post op  Best Call Back Number:[phone]  Additional Information: Epic denying the rescheduling of this visit type

## 2024-11-26 ENCOUNTER — CLINICAL SUPPORT (OUTPATIENT)
Dept: REHABILITATION | Facility: HOSPITAL | Age: 29
End: 2024-11-26
Payer: MEDICAID

## 2024-11-26 DIAGNOSIS — M94.9 OSTEOCHONDRAL LESION OF TALAR DOME: Primary | ICD-10-CM

## 2024-11-26 DIAGNOSIS — M65.971 SYNOVITIS OF RIGHT ANKLE: ICD-10-CM

## 2024-11-26 DIAGNOSIS — M89.9 OSTEOCHONDRAL LESION OF TALAR DOME: Primary | ICD-10-CM

## 2024-11-26 PROCEDURE — 97110 THERAPEUTIC EXERCISES: CPT | Mod: PN

## 2024-11-26 NOTE — PROGRESS NOTES
[facility] [place] [facility]   Physical Therapy Treatment Note      Name: Julieth Randhawa  Clinic Number: [id number]    Therapy Diagnosis:   Encounter Diagnoses   Name Primary?    Osteochondral lesion of talar dome Yes    Synovitis of right ankle          Physician: Roosevelt Orozco Jr., *    Visit Date: 11/26/2024    Physician Orders: PT Eval and Treat   Medical Diagnosis from Referral: aftercare following surgery of the musculoskeletal system; osteochondral lesion of talar dome, 09/13/2024  Evaluation Date: 10/15/2024  Authorization Period Expiration: 10/09/2025  Plan of Care Expiration: 12/10/2024  Progress Note Due: 11/16/2024  Date of Surgery: 09/13/2024  Visit #  6/12   FOTO: 1/ 3     Precautions: Standard and Fall; NWB until 10-30-24     Time In: 1120  Time Out: 1200  Total Billable Time: 40 minutes    PTA Visit #: -/5       Subjective     Patient reports: She is having more pain and swelling today unsure it d/t weather changes.  She was compliant with home exercise program.  Response to previous treatment: positive  Functional change: none    Pain: 0/10  Location: right feet      Objective       Julieth demonstrates the ability to bear 75%+ of her weight without adversity.           ACTIVE RANGE OF MOTION     LEFT RIGHT   Dorsiflexion (gastroc) normal -8 degrees   Dorsiflexion (soleus) normal -8 degrees   Plantarflexion normal normal   Inversion normal normal   Eversion normal  normal   PASSIVE RANGE OF MOTION   Dorsiflexion (gastroc)   4 degrees   Dorsiflexion (soleus)       Plantarflexion       Inversion       Eversion               LOWER EXTREMITY STRENGTH     Left Right   Knee Extension 5/5 5/5   Knee Flexion 5/5 5/5      Hip Flexion 5/5 5/5   Hip Abduction 5/5 5/5   Hip Extension 5/5 5/5   Hip ER 5/5 5/5   Hip IR 5/5 5/5   Ankle Dorsiflexion 5/5 4/5   Ankle Plantarflexion (single heel raise) 5/5 4/5   Ankle Inversion 5/5 4/5   Ankle Eversion 5/5 4/5          FLEXIBILITY   Hamstring  normal    Prone Quadriceps normal   Hip Extension normal   Hip Internal Rotation normal   Hip External Rotation normal   Piriformis normal   Dorsiflexion As above      DTR's:    Left Right   Patella Tendon 1+ 1+   Achilles Tendon 1+ 1+      DERMATOMES: Sensation: Light Touch: Impaired: L5 for the second toe and the medial aspect of the third toe  MYOTOMES:         PALPATION:  Patient reports no significant primary pain region, just soreness occasionally     GAIT: Julieth began weight bearing and gait today, 11/06/24 as requested by her MD.     GAIT DEVIATIONS: Julieth displays no deviations at this time secondary to non-weight bearing for 3 more weeks     Squat Mechanics: NA        Intake Outcome Measure for FOTO lower extremity Survey     Therapist reviewed FOTO scores for Julieth Randhawa on 10/15/2024.   FOTO report - see Media section or FOTO account episode details.  Patients intake functional measure is 15 on a scale approximating 0 - 100 (higher number = greater function).This FS measure places the patient in Stage 1 and means the patient has restricted ambulation.  Intake Score: 15%    Mid-term survey: 11/26/24   Patients Intake FS Score was 15 initially placing the patient in Stage 1. Patients FS  score now is 54 on a scale approximating 0 - 100 (39 points of functional change since intake), placing the patient in Stage 3 and means patient has community ambulation.         Treatment     Julieth received the treatments listed below:      Moist heat x10 minutes to the entire lower leg, pre-exercise  Ice x10 minutes to the ankle, post exercise    therapeutic exercises to develop strength, endurance, and ROM for 35 minutes including:    straight leg raise with 4# 2 x 10   sidelying hip abduction with 4# 2 x 10   Ankle boards: 3 minute each, plantar/dorsi-flexion, inversion/eversion, clockwise/counter clockwise circumduction  LAQ with ankle pump and 4# 2 x 10   towel curls x 3 min  Standing heel raises, 2x10  Standing  hip abduction, 4#, 2x10    Not performed:  lower extremity bike at level 3 x 7 min   manual therapy techniques: Joint mobilizations were applied to the: right foot for 8 minutes, including: PROM, joint mobs, scar mobs        Patient Education and Home Exercises       Education provided:   Continue home exercise program.   Educated on using best supportive shoes possible for weight bearing and gait  Recommended purchasing a [place] adjustable ankle support for added support and comfort outside of best case shoes used    Written Home Exercises Provided: Patient instructed to cont prior HEP. Exercises were reviewed and Julieth was able to demonstrate them prior to the end of the session.  Julieth demonstrated good  understanding of the education provided. See Electronic Medical Record under Patient Instructions for exercises provided during therapy sessions    Assessment     Patient tolerated today's session well. Julieth Is progressing well towards her goals. Wearing the boot no longer necessary. Intro to standing exercises may cause irritation but necessary to progress to conditioning for work.  Patient prognosis is Good.     Patient will continue to benefit from skilled outpatient physical therapy to address the deficits listed in the problem list box on initial evaluation, provide pt/family education and to maximize pt's level of independence in the home and community environment.     Patient's spiritual, cultural and educational needs considered and pt agreeable to plan of care and goals.     Anticipated barriers to physical therapy: none    Goals: Short Term Goals (3 Weeks):  1. Patient will be compliant with home exercise program to supplement therapy in restoring pain free function.  2. Patient will improve impaired lower extremity manual muscle tests to >/= 4+/5 to improve dynamic right ankle/support for functional tasks.     Long Term Goals (6 Weeks):  1. Patient will improve FOTO score to </= 50% limited to decrease  perceived limitation with mobility.   2. Patient will improve impaired lower extremity manual muscle tests to >/= 5-/5 to improve dynamic right ankle/support for functional tasks.     Plan      Plan of care Certification: 10/15/2024 to 12/10/2024.     Outpatient Physical Therapy 2 times weekly for 6 weeks to include the following interventions: Gait Training, Manual Therapy, Moist Heat/ Ice, Neuromuscular Re-ed, Patient Education, Therapeutic Activities, and Therapeutic Exercise.       Khadar Bowers, PT

## 2024-12-04 ENCOUNTER — OFFICE VISIT (OUTPATIENT)
Dept: PODIATRY | Facility: CLINIC | Age: 29
End: 2024-12-04
Payer: MEDICAID

## 2024-12-04 VITALS — BODY MASS INDEX: 51.91 KG/M2 | HEIGHT: 63 IN | WEIGHT: 293 LBS | RESPIRATION RATE: 18 BRPM

## 2024-12-04 DIAGNOSIS — Z47.89 AFTERCARE FOLLOWING SURGERY OF THE MUSCULOSKELETAL SYSTEM: Primary | ICD-10-CM

## 2024-12-04 PROCEDURE — 1159F MED LIST DOCD IN RCRD: CPT | Mod: CPTII,,, | Performed by: PODIATRIST

## 2024-12-04 PROCEDURE — 99213 OFFICE O/P EST LOW 20 MIN: CPT | Mod: PBBFAC,PN | Performed by: PODIATRIST

## 2024-12-04 PROCEDURE — 3044F HG A1C LEVEL LT 7.0%: CPT | Mod: CPTII,,, | Performed by: PODIATRIST

## 2024-12-04 PROCEDURE — 99024 POSTOP FOLLOW-UP VISIT: CPT | Mod: ,,, | Performed by: PODIATRIST

## 2024-12-04 PROCEDURE — 99999 PR PBB SHADOW E&M-EST. PATIENT-LVL III: CPT | Mod: PBBFAC,,, | Performed by: PODIATRIST

## 2024-12-04 NOTE — PROGRESS NOTES
[facility] - PODIATRY  [address] [facility]  [address]  [place] [address]  Dept: [phone]  Dept Fax: [phone]    NILES Ivory Jr., Jr.     Post-Operative Visit  Assessment:     1. Aftercare following surgery of the musculoskeletal system            Plan:   MDM    Coding  9 wk s/p    - pt seen evaluated and managed  - pt healing normal for postop period  - wbat in ASO x 2wks then wean  - wb: as above  - rx dispensed: none  - referrals: none  Cont PT      Follow up in about 3 weeks (around 12/25/2024).      Subjective:      Patient ID: Julieth Randhawa is a 29 y.o. female.    Chief Complaint:   No chief complaint on file.    There were no vitals filed for this visit.      DOS: 9/13/24  Procedure: R ankle arthroscopy with micro fx repair of talar OCD    Julieth Randhawa returns to the clinic today for a postop visit. Pt is s/p above procedure. Julieth Randhawa rates pain at a 2/10 on visual analog scale. Denies v/f/c. In formal PT.    HPI      Outside reports reviewed: historical medical records.    Past Medical History:   Diagnosis Date    Endometriosis     Heart attack 2019    silent heart attack, cardiac visits every 6 mths; told no issue until older    Osteochondral lesion of talar dome     right foot    Syncope and collapse     one time episode a couple of years ago    Synovitis of right ankle      Past Surgical History:   Procedure Laterality Date    ARTHROSCOPY, ANKLE, WITH DRILLING AND TALUS OSTEOCHONDRAL DEFECT EXCISION Right 9/13/2024    Procedure: ARTHROSCOPY, ANKLE, WITH DRILLING AND TALUS OSTEOCHONDRAL DEFECT EXCISION;  Surgeon: Roosevelt Orozco Jr., DPM;  Location: [facility] OR;  Service: Podiatry;  Laterality: Right;    BONE MARROW ASPIRATION Right 9/13/2024    Procedure: ASPIRATION, BONE MARROW;  Surgeon: Roosevelt Orozco Jr., DPM;  Location: [facility] OR;  Service: Podiatry;  Laterality: Right;    TONSILLECTOMY       WISDOM TOOTH EXTRACTION  2022     Family History   Problem Relation Name Age of Onset    Hypertension Mother       Current Outpatient Medications   Medication Sig Dispense Refill    aspirin 325 MG tablet Take 1 tablet (325 mg total) by mouth 2 (two) times a day. (Patient taking differently: Take 325 mg by mouth 2 (two) times a day. For after surgery) 120 tablet 0    buPROPion (WELLBUTRIN XL) 150 MG TB24 tablet Take 1 tablet (150 mg total) by mouth once daily. (Patient taking differently: Take 150 mg by mouth every evening.) 30 tablet 11    cariprazine HCl (VRAYLAR ORAL) Take 0.5 mg by mouth every other day. nightly      clonazePAM (KLONOPIN) 0.5 MG tablet Take 0.5 mg by mouth every evening.      gabapentin (NEURONTIN) 100 MG capsule Take 1 capsule (100 mg total) by mouth 2 (two) times daily. 60 capsule 1    meloxicam (MOBIC) 7.5 MG tablet Take 1 tablet (7.5 mg total) by mouth once daily. 60 tablet 0    ondansetron (ZOFRAN) 4 MG tablet Take 1 tablet (4 mg total) by mouth every 6 (six) hours as needed for Nausea. 25 tablet 0    prazosin (MINIPRESS) 1 MG Cap Take 1 mg by mouth nightly as needed.      tramadol-acetaminophen 37.5-325 mg (ULTRACET) 37.5-325 mg Tab Take 1 tablet by mouth every 6 (six) hours as needed for Pain. 25 tablet 0     No current facility-administered medications for this visit.     Review of patient's allergies indicates:   Allergen Reactions    Turmeric Anaphylaxis and Other (See Comments)     Social History     Socioeconomic History    Marital status: Single   Tobacco Use    Smoking status: Never    Smokeless tobacco: Never   Substance and Sexual Activity    Alcohol use: Never    Drug use: Never    Sexual activity: Yes     Partners: Female       ROS  REVIEW OF SYSTEMS: Negative as documented below as well as positive findings in bold.       Constitutional  Respiratory  Gastrointestinal  Skin   - Fever - Cough - Heartburn - Rash   - Chills - Spit blood - Nausea - Itching   - Weight Loss -  Shortness of breath - Vomiting - Nail pain   - Malaise/Fatigue - Wheezing - Abdominal Pain  Wound/Ulcer   - Weight Gain   - Blood in Stool         - Diarrhea          Cardiovascular  Genitourinary  Neurological  HEENT   - Chest Pain - Dysuria - Dizziness - Headache   - Palpitations - Hematuria - Tingling - Congestion   - Pain at night in legs - Flank Pain - Tremor - Sore Throat   - Cramping   - Sensory Change - Blurred Vision   - Leg Swelling   - Speech Change - Double Vision   - Dizzy when standing   - Focal Weakness - Eye Redness       - Seizures - Dry Eyes       - Loss of Consciousness          Endocrine  Musculoskeletal  Psychiatric   - Cold intolerance - Muscle Pain - Depression   - Heat intolerance - Neck Pain - Insomnia   - Anemia - Joint Pain - Memory Loss   -  Easy bruising, bleeding - Heel pain - Anxiety      Toe Pain        Leg/Ankle/Foot Pain         Objective:     There were no vitals taken for this visit.    Physical Exam    Neck:  Trachea Midline  No visible masses    Respiratory/Ears:  No distress or labored breathing.  Able to differentiate between normal talking voice and whisper.  Able to follow commands    Eyes:  Visual Acuity intact  No discoloration noted.    Physical Exam  Ortho Exam  Foot Exam    R LE exam con't:  V: DP 2/4, PT 2/4, CRT< 3s to all digits tested.    N: SILT in SP/DP/T/Hunter/Saph distributions    Ortho: +Motor EHL/FHL/TA/GA   Surgical site pain present and mild   Surgical site swelling absent   Ankle ROM normal and pain free   Compartments soft/compressible. No pain on passive stretch of big toe. No calf  Pain.     Derm: Skin intact. Sutures/staples: removed. Signs of infection: none.     Imaging / Labs:    Lab Results   Component Value Date    WBC 10.15 08/16/2024    WBC 8.93 01/23/2024    WBC 9.38 04/20/2023    WBC 8.20 05/03/2022    WBC 9.57 02/21/2022    PREALBUMIN 14 (L) 08/16/2024       Lab Results   Component Value Date    PREALBUMIN 14 (L) 08/16/2024       SURG FL Surgery  Fluoro Usage    Result Date: 9/13/2024  See OP Notes for results. IMPRESSION: See OP Notes for results. This procedure was auto-finalized by: Virtual Radiologist

## 2024-12-12 ENCOUNTER — CLINICAL SUPPORT (OUTPATIENT)
Dept: REHABILITATION | Facility: HOSPITAL | Age: 29
End: 2024-12-12
Payer: MEDICAID

## 2024-12-12 DIAGNOSIS — M65.971 SYNOVITIS OF RIGHT ANKLE: ICD-10-CM

## 2024-12-12 DIAGNOSIS — M89.9 OSTEOCHONDRAL LESION OF TALAR DOME: Primary | ICD-10-CM

## 2024-12-12 DIAGNOSIS — M94.9 OSTEOCHONDRAL LESION OF TALAR DOME: Primary | ICD-10-CM

## 2024-12-12 PROCEDURE — 97110 THERAPEUTIC EXERCISES: CPT | Mod: PN

## 2024-12-12 PROCEDURE — 97112 NEUROMUSCULAR REEDUCATION: CPT | Mod: PN

## 2024-12-12 NOTE — PROGRESS NOTES
[facility]   Physical Therapy Treatment Note      Name: Julieth Randhawa  Clinic Number: [id number]    Therapy Diagnosis:   Encounter Diagnoses   Name Primary?    Osteochondral lesion of talar dome Yes    Synovitis of right ankle          Physician: Roosevelt Orozco Jr., *    Visit Date: 12/12/2024    Physician Orders: PT Eval and Treat   Medical Diagnosis from Referral: aftercare following surgery of the musculoskeletal system; osteochondral lesion of talar dome, 09/13/2024  Evaluation Date: 10/15/2024  Authorization Period Expiration: 10/09/2025  Plan of Care Expiration: 12/10/2024  Progress Note Due: 11/16/2024  Date of Surgery: 09/13/2024  Visit #  7/12   FOTO: 1/ 3     Precautions: Standard and Fall; NWB until 10-30-24     Time In: 1100  Time Out: 1145  Total Billable Time: 40 minutes    PTA Visit #: -/5       Subjective     Patient reports: She is having more pain and swelling today unsure it d/t weather changes.  She was compliant with home exercise program.  Response to previous treatment: positive  Functional change: none    Pain: 0/10  Location: right feet      Objective       Julieth demonstrates the ability to bear 75%+ of her weight without adversity.           ACTIVE RANGE OF MOTION     LEFT RIGHT   Dorsiflexion (gastroc) normal -8 degrees   Dorsiflexion (soleus) normal -8 degrees   Plantarflexion normal normal   Inversion normal normal   Eversion normal  normal   PASSIVE RANGE OF MOTION   Dorsiflexion (gastroc)   4 degrees   Dorsiflexion (soleus)       Plantarflexion       Inversion       Eversion               LOWER EXTREMITY STRENGTH     Left Right   Knee Extension 5/5 5/5   Knee Flexion 5/5 5/5      Hip Flexion 5/5 5/5   Hip Abduction 5/5 5/5   Hip Extension 5/5 5/5   Hip ER 5/5 5/5   Hip IR 5/5 5/5   Ankle Dorsiflexion 5/5 4/5   Ankle Plantarflexion (single heel raise) 5/5 4/5   Ankle Inversion 5/5 4/5   Ankle Eversion 5/5 4/5          FLEXIBILITY   Hamstring  normal    Prone Quadriceps normal   Hip Extension normal   Hip Internal Rotation normal   Hip External Rotation normal   Piriformis normal   Dorsiflexion As above      DTR's:    Left Right   Patella Tendon 1+ 1+   Achilles Tendon 1+ 1+      DERMATOMES: Sensation: Light Touch: Impaired: L5 for the second toe and the medial aspect of the third toe  MYOTOMES:         PALPATION:  Patient reports no significant primary pain region, just soreness occasionally     GAIT: Julieth began weight bearing and gait today, 11/06/24 as requested by her MD.     GAIT DEVIATIONS: Julieth displays no deviations at this time secondary to non-weight bearing for 3 more weeks     Squat Mechanics: NA        Intake Outcome Measure for FOTO lower extremity Survey     Therapist reviewed FOTO scores for Julieth Randhawa on 10/15/2024.   FOTO report - see Media section or FOTO account episode details.  Patients intake functional measure is 15 on a scale approximating 0 - 100 (higher number = greater function).This FS measure places the patient in Stage 1 and means the patient has restricted ambulation.  Intake Score: 15%    Mid-term survey: 11/26/24   Patients Intake FS Score was 15 initially placing the patient in Stage 1. Patients FS  score now is 54 on a scale approximating 0 - 100 (39 points of functional change since intake), placing the patient in Stage 3 and means patient has community ambulation.    DISCHARGE SURVEY (surgeon request) 12/12/24  Patients Intake FS Score was 15 initially placing the patient in Stage 1. Patient's interim score was 54 on a scale approximating 0 - 100 (39 points of functional change since intake), placing the patient in Stage 3 and means patient has community ambulation.  Patients FS score now is 91 on a scale approximating 0 - 100 (76 points  of functional change since intake), placing the patient in Stage 5 and  means patient has athletic ability.         Treatment     Julieth received the treatments listed below:       Moist heat x10 minutes to the entire lower leg, pre-exercise  Ice x10 minutes to the ankle, post exercise    therapeutic exercises to develop strength, endurance, and ROM for 35 minutes including:    straight leg raise with 4# 2 x 10   sidelying hip abduction with 4# 2 x 10   Ankle boards: 3 minute each, plantar/dorsi-flexion, inversion/eversion, clockwise/counter clockwise circumduction  LAQ with ankle pump and 4# 2 x 10   towel curls x 3 min  Standing bilateral heel raises, 2x10; single heel raise 1x10  Standing hip abduction, 4#, 2x10    Not performed:  lower extremity bike at level 3 x 7 min   manual therapy techniques: Joint mobilizations were applied to the: right foot for 8 minutes, including: PROM, joint mobs, scar mobs        Patient Education and Home Exercises       Education provided:   Continue home exercise program.   Educated on using best supportive shoes possible for weight bearing and gait  Recommended purchasing a [employer] adjustable ankle support for added support and comfort outside of best case shoes used    Written Home Exercises Provided: Patient instructed to cont prior HEP. Exercises were reviewed and Julieth was able to demonstrate them prior to the end of the session.  Julieth demonstrated good  understanding of the education provided. See Electronic Medical Record under Patient Instructions for exercises provided during therapy sessions    Assessment     Patient tolerated today's session well. Julieth has progressed and achieved her goals. She demonstrated normal range, normal gait pattern, normal isolated ankle strength and qualifies for discharge today.  Patient prognosis is Good.     Patient will continue to benefit from her HEP as outlined in skilled physical therapy to address the deficits listed in the problem list box on initial evaluation, provide pt/family education and to maximize pt's level of independence in the home and community environment.     Patient's spiritual, cultural and  educational needs considered and pt agreeable to plan of care and goals.     Anticipated barriers to physical therapy: none    Goals: Short Term Goals (3 Weeks):  1. Patient will be compliant with home exercise program to supplement therapy in restoring pain free function. ACHIEVED  2. Patient will improve impaired lower extremity manual muscle tests to >/= 4+/5 to improve dynamic right ankle/support for functional tasks. ACHIEVED     Long Term Goals (6 Weeks):  1. Patient will improve FOTO score to </= 50% limited to decrease perceived limitation with mobility. ACHIEVED  2. Patient will improve impaired lower extremity manual muscle tests to >/= 5-/5 to improve dynamic right ankle/support for functional tasks. ACHIEVED     Plan      Plan of care Certification: 10/15/2024 to 12/10/2024.     Outpatient Physical Therapy is now done; the patient demonstrated exponential recovery placing the patient at full recovery as per her surgeon. PT in agreement with the surgeon and will discharge today.      Khadar Bowers, PT

## 2024-12-19 ENCOUNTER — TELEPHONE (OUTPATIENT)
Dept: PODIATRY | Facility: CLINIC | Age: 29
End: 2024-12-19

## 2024-12-19 ENCOUNTER — OFFICE VISIT (OUTPATIENT)
Dept: PODIATRY | Facility: CLINIC | Age: 29
End: 2024-12-19
Payer: MEDICAID

## 2024-12-19 VITALS — BODY MASS INDEX: 51.91 KG/M2 | HEIGHT: 63 IN | RESPIRATION RATE: 18 BRPM | WEIGHT: 293 LBS

## 2024-12-19 DIAGNOSIS — Z47.89 AFTERCARE FOLLOWING SURGERY OF THE MUSCULOSKELETAL SYSTEM: Primary | ICD-10-CM

## 2024-12-19 PROCEDURE — 99024 POSTOP FOLLOW-UP VISIT: CPT | Mod: ,,, | Performed by: PODIATRIST

## 2024-12-19 PROCEDURE — 99213 OFFICE O/P EST LOW 20 MIN: CPT | Mod: PBBFAC,PN | Performed by: PODIATRIST

## 2024-12-19 PROCEDURE — 3044F HG A1C LEVEL LT 7.0%: CPT | Mod: CPTII,,, | Performed by: PODIATRIST

## 2024-12-19 PROCEDURE — 1159F MED LIST DOCD IN RCRD: CPT | Mod: CPTII,,, | Performed by: PODIATRIST

## 2024-12-19 PROCEDURE — 99999 PR PBB SHADOW E&M-EST. PATIENT-LVL III: CPT | Mod: PBBFAC,,, | Performed by: PODIATRIST

## 2024-12-19 NOTE — PROGRESS NOTES
[facility] - PODIATRY  [address] [facility]  [address]  [place] [address]  Dept: [phone]  Dept Fax: [phone]    NILES Ivory Jr., Jr.     Post-Operative Visit  Assessment:     1. Aftercare following surgery of the musculoskeletal system            Plan:   MDM    Coding  12 wk s/p    - pt seen evaluated and managed  - pt at end of healing  - good ROM on exam  - pt is pain free and happy with result  - some postop swelling/edema/discomfort expected up to 1 yr postop    - wb: wbat  - rx dispensed: none  - referrals: none      Follow up if symptoms worsen or fail to improve.      Subjective:      Patient ID: Julieth Randhawa is a 29 y.o. female.    Chief Complaint:   Chief Complaint   Patient presents with    Post-op Evaluation     Vitals:    12/19/24 0850   Resp: 18         DOS: 9/13/24  Procedure: R ankle arthroscopy with micro fx repair of talar OCD    Julieth Randhawa returns to the clinic today for a postop visit. Pt is s/p above procedure. Julieth Randhawa rates pain at a 1/10 on visual analog scale. Denies v/f/c. Mostly finished with formal PT.    HPI      Outside reports reviewed: historical medical records.    Past Medical History:   Diagnosis Date    Endometriosis     Heart attack 2019    silent heart attack, cardiac visits every 6 mths; told no issue until older    Osteochondral lesion of talar dome     right foot    Syncope and collapse     one time episode a couple of years ago    Synovitis of right ankle      Past Surgical History:   Procedure Laterality Date    ARTHROSCOPY, ANKLE, WITH DRILLING AND TALUS OSTEOCHONDRAL DEFECT EXCISION Right 9/13/2024    Procedure: ARTHROSCOPY, ANKLE, WITH DRILLING AND TALUS OSTEOCHONDRAL DEFECT EXCISION;  Surgeon: Roosevelt Orozco Jr., DPM;  Location: [facility];  Service: Podiatry;  Laterality: Right;    BONE MARROW ASPIRATION Right 9/13/2024    Procedure: ASPIRATION, BONE MARROW;   Surgeon: Roosevelt Orozco Jr., DPM;  Location: [facility];  Service: Podiatry;  Laterality: Right;    TONSILLECTOMY      WISDOM TOOTH EXTRACTION  2022     Family History   Problem Relation Name Age of Onset    Hypertension Mother       Current Outpatient Medications   Medication Sig Dispense Refill    cariprazine HCl (VRAYLAR ORAL) Take 0.5 mg by mouth every other day. nightly      clonazePAM (KLONOPIN) 0.5 MG tablet Take 0.5 mg by mouth every evening.      ondansetron (ZOFRAN) 4 MG tablet Take 1 tablet (4 mg total) by mouth every 6 (six) hours as needed for Nausea. 25 tablet 0    prazosin (MINIPRESS) 1 MG Cap Take 1 mg by mouth nightly as needed.      tramadol-acetaminophen 37.5-325 mg (ULTRACET) 37.5-325 mg Tab Take 1 tablet by mouth every 6 (six) hours as needed for Pain. 25 tablet 0    aspirin 325 MG tablet Take 1 tablet (325 mg total) by mouth 2 (two) times a day. (Patient taking differently: Take 325 mg by mouth 2 (two) times a day. For after surgery) 120 tablet 0    buPROPion (WELLBUTRIN XL) 150 MG TB24 tablet Take 1 tablet (150 mg total) by mouth once daily. (Patient taking differently: Take 150 mg by mouth every evening.) 30 tablet 11    gabapentin (NEURONTIN) 100 MG capsule Take 1 capsule (100 mg total) by mouth 2 (two) times daily. 60 capsule 1     No current facility-administered medications for this visit.     Review of patient's allergies indicates:   Allergen Reactions    Turmeric Anaphylaxis and Other (See Comments)     Social History     Socioeconomic History    Marital status: Single   Tobacco Use    Smoking status: Never    Smokeless tobacco: Never   Substance and Sexual Activity    Alcohol use: Never    Drug use: Never    Sexual activity: Yes     Partners: Female       ROS  REVIEW OF SYSTEMS: Negative as documented below as well as positive findings in bold.       Constitutional  Respiratory  Gastrointestinal  Skin   - Fever - Cough - Heartburn - Rash   - Chills - Spit blood - Nausea - Itching   -  "Weight Loss - Shortness of breath - Vomiting - Nail pain   - Malaise/Fatigue - Wheezing - Abdominal Pain  Wound/Ulcer   - Weight Gain   - Blood in Stool         - Diarrhea          Cardiovascular  Genitourinary  Neurological  HEENT   - Chest Pain - Dysuria - Dizziness - Headache   - Palpitations - Hematuria - Tingling - Congestion   - Pain at night in legs - Flank Pain - Tremor - Sore Throat   - Cramping   - Sensory Change - Blurred Vision   - Leg Swelling   - Speech Change - Double Vision   - Dizzy when standing   - Focal Weakness - Eye Redness       - Seizures - Dry Eyes       - Loss of Consciousness          Endocrine  Musculoskeletal  Psychiatric   - Cold intolerance - Muscle Pain - Depression   - Heat intolerance - Neck Pain - Insomnia   - Anemia - Joint Pain - Memory Loss   -  Easy bruising, bleeding - Heel pain - Anxiety      Toe Pain        Leg/Ankle/Foot Pain         Objective:     Resp 18   Ht 5' 3" (1.6 m)   Wt (!) 137 kg (302 lb 0.5 oz)   BMI 53.50 kg/m²     Physical Exam    Neck:  Trachea Midline  No visible masses    Respiratory/Ears:  No distress or labored breathing.  Able to differentiate between normal talking voice and whisper.  Able to follow commands    Eyes:  Visual Acuity intact  No discoloration noted.    Physical Exam  Ortho Exam  Foot Exam    R LE exam con't:  V: DP 2/4, PT 2/4, CRT< 3s to all digits tested.    N: SILT in SP/DP/T/Hunter/Saph distributions    Ortho: +Motor EHL/FHL/TA/GA   Surgical site pain absent   Surgical site swelling absent   Ankle ROM normal and pain free   Compartments soft/compressible. No pain on passive stretch of big toe. No calf  Pain.     Derm: Skin intact. Sutures/staples: removed. Signs of infection: none.     Imaging / Labs:    Lab Results   Component Value Date    WBC 10.15 08/16/2024    WBC 8.93 01/23/2024    WBC 9.38 04/20/2023    WBC 8.20 05/03/2022    WBC 9.57 02/21/2022    PREALBUMIN 14 (L) 08/16/2024       Lab Results   Component Value Date    " PREALBUMIN 14 (L) 08/16/2024       SURG [facility] Surgery Fluoro Usage    Result Date: 9/13/2024  See OP Notes for results. IMPRESSION: See OP Notes for results. This procedure was auto-finalized by: Virtual Radiologist

## 2025-04-19 ENCOUNTER — HOSPITAL ENCOUNTER (EMERGENCY)
Facility: HOSPITAL | Age: 30
Discharge: HOME | End: 2025-04-19
Attending: SURGERY
Payer: MEDICAID

## 2025-04-19 VITALS
WEIGHT: 293 LBS | RESPIRATION RATE: 20 BRPM | DIASTOLIC BLOOD PRESSURE: 62 MMHG | TEMPERATURE: 99 F | HEART RATE: 85 BPM | OXYGEN SATURATION: 99 % | HEIGHT: 63 IN | BODY MASS INDEX: 51.91 KG/M2 | SYSTOLIC BLOOD PRESSURE: 113 MMHG

## 2025-04-19 DIAGNOSIS — I10 HTN (HYPERTENSION): Primary | ICD-10-CM

## 2025-04-19 LAB
ABSOLUTE EOSINOPHIL (OHS): 0.35 K/UL
ABSOLUTE MONOCYTE (OHS): 0.99 K/UL (ref 0.3–1)
ABSOLUTE NEUTROPHIL COUNT (OHS): 5.35 K/UL (ref 1.8–7.7)
ALBUMIN SERPL BCP-MCNC: 3.6 G/DL (ref 3.5–5.2)
ALP SERPL-CCNC: 89 UNIT/L (ref 40–150)
ALT SERPL W/O P-5'-P-CCNC: 26 UNIT/L (ref 10–44)
ANION GAP (OHS): 11 MMOL/L (ref 8–16)
AST SERPL-CCNC: 23 UNIT/L (ref 11–45)
B-HCG UR QL: NEGATIVE
BASOPHILS # BLD AUTO: 0.08 K/UL
BASOPHILS NFR BLD AUTO: 0.8 %
BILIRUB SERPL-MCNC: 0.2 MG/DL (ref 0.1–1)
BILIRUB UR QL STRIP.AUTO: NEGATIVE
BNP SERPL-MCNC: 45 PG/ML (ref 0–99)
BUN SERPL-MCNC: 11 MG/DL (ref 6–20)
CALCIUM SERPL-MCNC: 9 MG/DL (ref 8.7–10.5)
CHLORIDE SERPL-SCNC: 108 MMOL/L (ref 95–110)
CLARITY UR: CLEAR
CO2 SERPL-SCNC: 20 MMOL/L (ref 23–29)
COLOR UR AUTO: YELLOW
CREAT SERPL-MCNC: 0.7 MG/DL (ref 0.5–1.4)
ERYTHROCYTE [DISTWIDTH] IN BLOOD BY AUTOMATED COUNT: 14 % (ref 11.5–14.5)
GFR SERPLBLD CREATININE-BSD FMLA CKD-EPI: >60 ML/MIN/1.73/M2
GLUCOSE SERPL-MCNC: 79 MG/DL (ref 70–110)
GLUCOSE UR QL STRIP: NEGATIVE
HCT VFR BLD AUTO: 40.8 % (ref 37–48.5)
HGB BLD-MCNC: 13.3 GM/DL (ref 12–16)
HGB UR QL STRIP: NEGATIVE
HOLD SPECIMEN: NORMAL
IMM GRANULOCYTES # BLD AUTO: 0.03 K/UL (ref 0–0.04)
IMM GRANULOCYTES NFR BLD AUTO: 0.3 % (ref 0–0.5)
KETONES UR QL STRIP: NEGATIVE
LEUKOCYTE ESTERASE UR QL STRIP: NEGATIVE
LIPASE SERPL-CCNC: 26 U/L (ref 4–60)
LYMPHOCYTES # BLD AUTO: 3.44 K/UL (ref 1–4.8)
MCH RBC QN AUTO: 27.7 PG (ref 27–31)
MCHC RBC AUTO-ENTMCNC: 32.6 G/DL (ref 32–36)
MCV RBC AUTO: 85 FL (ref 82–98)
NITRITE UR QL STRIP: NEGATIVE
NUCLEATED RBC (/100WBC) (OHS): 0 /100 WBC
PH UR STRIP: 7 [PH]
PLATELET # BLD AUTO: 297 K/UL (ref 150–450)
PMV BLD AUTO: 11.1 FL (ref 9.2–12.9)
POTASSIUM SERPL-SCNC: 3.9 MMOL/L (ref 3.5–5.1)
PROT SERPL-MCNC: 7.3 GM/DL (ref 6–8.4)
PROT UR QL STRIP: NEGATIVE
RBC # BLD AUTO: 4.81 M/UL (ref 4–5.4)
RELATIVE EOSINOPHIL (OHS): 3.4 %
RELATIVE LYMPHOCYTE (OHS): 33.6 % (ref 18–48)
RELATIVE MONOCYTE (OHS): 9.7 % (ref 4–15)
RELATIVE NEUTROPHIL (OHS): 52.2 % (ref 38–73)
SODIUM SERPL-SCNC: 139 MMOL/L (ref 136–145)
SP GR UR STRIP: 1.02
TROPONIN I SERPL DL<=0.01 NG/ML-MCNC: <0.006 NG/ML
UROBILINOGEN UR STRIP-ACNC: NEGATIVE EU/DL
WBC # BLD AUTO: 10.24 K/UL (ref 3.9–12.7)

## 2025-04-19 PROCEDURE — 80053 COMPREHEN METABOLIC PANEL: CPT | Performed by: NURSE PRACTITIONER

## 2025-04-19 PROCEDURE — 84484 ASSAY OF TROPONIN QUANT: CPT | Performed by: NURSE PRACTITIONER

## 2025-04-19 PROCEDURE — 83690 ASSAY OF LIPASE: CPT | Performed by: NURSE PRACTITIONER

## 2025-04-19 PROCEDURE — 99285 EMERGENCY DEPT VISIT HI MDM: CPT | Mod: 25

## 2025-04-19 PROCEDURE — 93005 ELECTROCARDIOGRAM TRACING: CPT

## 2025-04-19 PROCEDURE — 85025 COMPLETE CBC W/AUTO DIFF WBC: CPT | Performed by: NURSE PRACTITIONER

## 2025-04-19 PROCEDURE — 81025 URINE PREGNANCY TEST: CPT | Performed by: NURSE PRACTITIONER

## 2025-04-19 PROCEDURE — 93010 ELECTROCARDIOGRAM REPORT: CPT | Mod: ,,, | Performed by: INTERNAL MEDICINE

## 2025-04-19 PROCEDURE — 83880 ASSAY OF NATRIURETIC PEPTIDE: CPT | Performed by: NURSE PRACTITIONER

## 2025-04-19 PROCEDURE — 81003 URINALYSIS AUTO W/O SCOPE: CPT | Performed by: NURSE PRACTITIONER

## 2025-04-19 NOTE — ED PROVIDER NOTES
"Encounter Date: 4/19/2025       History     Chief Complaint   Patient presents with    Hypertension     Pt arrived to ED c/o high blood pressure at urgent care (169/117), nausea, headache, dizziness, and nose bleed that started this morning. Pt's blood pressure in triage 162/91. Pt denies being on blood pressure medication.      Julieth Randhawa is a 29 y.o. female with PMH of endometriosis, syncope and collapse presenting to the ED for evaluation of dizziness and hypertension.  Patient reports that she woke up this a.m. feeling "off." She went to work where she became dizzy and developed a generalized headache. She checked her BP and it was 160s/100s. No prior hx of HTN. She left work and in the car ride to  she developed a nose bleed that has since subsided. When she presented to , they were unable to obtain a BP therefore sent her to the ED for further evaluation.  She presents with continued dizziness and generalized headache.  She currently denies CP or SOB although she notes that while at work she did feel a tightness in her chest. Denies feeling SOB.     The history is provided by the patient.     Review of patient's allergies indicates:   Allergen Reactions    Turmeric Anaphylaxis and Other (See Comments)     Past Medical History:   Diagnosis Date    Endometriosis     Heart attack 2019    silent heart attack, cardiac visits every 6 mths; told no issue until older    Osteochondral lesion of talar dome     right foot    Syncope and collapse     one time episode a couple of years ago    Synovitis of right ankle      Past Surgical History:   Procedure Laterality Date    ARTHROSCOPY, ANKLE, WITH DRILLING AND TALUS OSTEOCHONDRAL DEFECT EXCISION Right 9/13/2024    Procedure: ARTHROSCOPY, ANKLE, WITH DRILLING AND TALUS OSTEOCHONDRAL DEFECT EXCISION;  Surgeon: Roosevelt Orozco Jr., DPM;  Location: [facility];  Service: Podiatry;  Laterality: Right;    BONE MARROW ASPIRATION Right 9/13/2024    Procedure: " ASPIRATION, BONE MARROW;  Surgeon: Roosevelt Orozco Jr., DPM;  Location: [facility];  Service: Podiatry;  Laterality: Right;    TONSILLECTOMY      WISDOM TOOTH EXTRACTION  2022     Family History   Problem Relation Name Age of Onset    Hypertension Mother       Social History[1]  Review of Systems   Constitutional:  Negative for activity change, chills and fever.   HENT:  Negative for congestion, ear discharge, ear pain, postnasal drip, sinus pressure, sinus pain and sore throat.    Respiratory:  Negative for cough, chest tightness and shortness of breath.    Cardiovascular:  Negative for chest pain.   Gastrointestinal:  Negative for abdominal distention, abdominal pain and nausea.   Genitourinary:  Negative for dysuria, frequency and urgency.   Musculoskeletal:  Negative for back pain.   Skin:  Negative for rash.   Neurological:  Positive for dizziness and headaches. Negative for weakness, light-headedness and numbness.   Hematological:  Does not bruise/bleed easily.       Physical Exam     Initial Vitals [04/19/25 1357]   BP Pulse Resp Temp SpO2   (!) 162/91 74 19 98.5 °F (36.9 °C) 100 %      MAP       --         Physical Exam    Nursing note and vitals reviewed.  Constitutional: She appears well-developed and well-nourished.   HENT:   Head: Normocephalic and atraumatic.   Eyes: Conjunctivae and EOM are normal. Pupils are equal, round, and reactive to light.   Neck: Neck supple.   Cardiovascular:  Normal rate, regular rhythm, normal heart sounds and intact distal pulses.           Pulmonary/Chest: Breath sounds normal.   Abdominal: Abdomen is soft. Bowel sounds are normal.   Musculoskeletal:         General: Normal range of motion.      Cervical back: Neck supple.     Neurological: She is alert and oriented to person, place, and time. She has normal strength. No cranial nerve deficit or sensory deficit. GCS eye subscore is 4. GCS verbal subscore is 5. GCS motor subscore is 6.   Alert. Though processes coherent. AAO  X 3. CN's intact. Good muscle bulk and tone. Strength 5/5 throughout. Cerebellar: F-N intact, H-S intact. Gait normal.   Skin: Skin is warm and dry. Capillary refill takes less than 2 seconds.   Psychiatric: She has a normal mood and affect. Her behavior is normal. Judgment and thought content normal.         ED Course   Procedures  Labs Reviewed   COMPREHENSIVE METABOLIC PANEL - Abnormal       Result Value    Sodium 139      Potassium 3.9      Chloride 108      CO2 20 (*)     Glucose 79      BUN 11      Creatinine 0.7      Calcium 9.0      Protein Total 7.3      Albumin 3.6      Bilirubin Total 0.2      ALP 89      AST 23      ALT 26      Anion Gap 11      eGFR >60     URINALYSIS, REFLEX TO URINE CULTURE - Normal    Color, UA Yellow      Appearance, UA Clear      pH, UA 7.0      Spec Grav UA 1.020      Protein, UA Negative      Glucose, UA Negative      Ketones, UA Negative      Bilirubin, UA Negative      Blood, UA Negative      Nitrites, UA Negative      Urobilinogen, UA Negative      Leukocyte Esterase, UA Negative     PREGNANCY TEST, URINE RAPID - Normal    hCG Qualitative, Urine Negative     LIPASE - Normal    Lipase Level 26     TROPONIN I - Normal    Troponin-I <0.006     B-TYPE NATRIURETIC PEPTIDE - Normal    BNP 45     CBC WITH DIFFERENTIAL - Normal    WBC 10.24      RBC 4.81      HGB 13.3      HCT 40.8      MCV 85      MCH 27.7      MCHC 32.6      RDW 14.0      Platelet Count 297      MPV 11.1      Nucleated RBC 0      Neut % 52.2      Lymph % 33.6      Mono % 9.7      Eos % 3.4      Basophil % 0.8      Imm Grans % 0.3      Neut # 5.35      Lymph # 3.44      Mono # 0.99      Eos # 0.35      Baso # 0.08      Imm Grans # 0.03     CBC W/ AUTO DIFFERENTIAL    Narrative:     The following orders were created for panel order CBC Auto Differential.  Procedure                               Abnormality         Status                     ---------                               -----------         ------                      CBC with Differential[[id number]]       Normal              Final result                 Please view results for these tests on the individual orders.   GREY TOP URINE HOLD          Imaging Results              CT Head Without Contrast (Final result)  Result time 04/19/25 14:59:34      Final result by Froy Ugalde MD (04/19/25 14:59:34)                   Impression:      No acute intracranial process.      Electronically signed by: Froy Ugalde  Date:    04/19/2025  Time:    14:59               Narrative:    EXAMINATION:  CT HEAD WITHOUT CONTRAST    CLINICAL HISTORY:  Dizziness, persistent/recurrent, cardiac or vascular cause suspected;    TECHNIQUE:  Low dose axial CT images obtained throughout the head without intravenous contrast. Sagittal and coronal reconstructions were performed.    COMPARISON:  None.    FINDINGS:  Intracranial compartment:    Ventricles and sulci are normal in size for age without evidence of hydrocephalus. No extra-axial blood or fluid collections.    The brain parenchyma appears normal. No parenchymal mass, hemorrhage, edema or major vascular distribution infarct.    Skull/extracranial contents (limited evaluation): No fracture. Mastoid air cells and paranasal sinuses are essentially clear.                                       X-Ray Chest AP Portable (Final result)  Result time 04/19/25 15:00:52      Final result by Sarwat Campbell MD (04/19/25 15:00:52)                   Impression:      No acute chest disease.      Electronically signed by: Sarwat Campbell  Date:    04/19/2025  Time:    15:00               Narrative:    EXAMINATION:  XR CHEST AP PORTABLE    CLINICAL HISTORY:  Fever, unspecified    TECHNIQUE:  Portable view of the chest was performed.    COMPARISON:  08/16/2024.    FINDINGS:  Lungs are clear.  No focal consolidation.  Heart size mildly enlarged.  Mediastinal contours unremarkable.  Trachea midline.    Bony thorax intact.                                        Medications - No data to display  Medical Decision Making  Evaluation of a 29-year-old female presenting with hypertension, dizziness, and generalized headache.  Presents nontoxic appearing with stable vital signs  Blood pressure 162/91  No prior history of hypertension  No focal neurological deficits on exam    Differential diagnosis includes hypertensive headache, hypertensive urgency, dehydration, electrolyte derangement, ICH, SDH, SAH, CVA, UTI    Amount and/or Complexity of Data Reviewed  Labs: ordered. Decision-making details documented in ED Course.  Radiology: ordered. Decision-making details documented in ED Course.  ECG/medicine tests: ordered and independent interpretation performed.     Details: Normal sinus rhythm, rate 73.  Normal MS and QRS interval.  No STEMI.  No significant change when compared to EKG of July 2024    Risk  Risk Details: Stable for discharge home.  Negative cardiac and metabolic workup.  Negative head CT.  Normotensive in the ED with BPs of 120s/60s.  Will discharge home with close outpatient follow-up with PCP and Cardiology.  Instructed to monitor blood pressure at home and keep a log for follow-up with PCP. DASH diet instructions provided. Patient/caregiver voices understanding and feels comfortable with discharge plan.      The patient acknowledges that close follow up with medical provider is required. Instructed to follow up with PCP within 2 days. Patient was given specific return precautions. The patient agrees to comply with all instruction and directions given in the ER.                                        Clinical Impression:  Final diagnoses:  [I10] HTN (hypertension) (Primary)          ED Disposition Condition    Discharge Stable          ED Prescriptions    None       Follow-up Information       Follow up With Specialties Details Why Contact Info    [facility]  Schedule an appointment as soon as possible for a visit in 2 days                   [1]   Social  History  Tobacco Use    Smoking status: Never    Smokeless tobacco: Never   Substance Use Topics    Alcohol use: Never    Drug use: Never        Ruchi Graham NP  04/19/25 5633

## 2025-04-19 NOTE — ED TRIAGE NOTES
Pt arrived to ED c/o high blood pressure at urgent care (169/117), nausea, headache, dizziness, and nose bleed that started this morning. Pt's blood pressure in triage 162/91. Pt denies being on blood pressure medication.

## 2025-04-22 LAB
OHS QRS DURATION: 82 MS
OHS QTC CALCULATION: 453 MS

## 2025-05-14 ENCOUNTER — OFFICE VISIT (OUTPATIENT)
Dept: CARDIOLOGY | Facility: CLINIC | Age: 30
End: 2025-05-14
Payer: MEDICAID

## 2025-05-14 VITALS
SYSTOLIC BLOOD PRESSURE: 124 MMHG | RESPIRATION RATE: 18 BRPM | DIASTOLIC BLOOD PRESSURE: 96 MMHG | WEIGHT: 293 LBS | OXYGEN SATURATION: 96 % | HEART RATE: 102 BPM | BODY MASS INDEX: 51.91 KG/M2 | HEIGHT: 63 IN

## 2025-05-14 DIAGNOSIS — E66.01 MORBID OBESITY WITH BMI OF 45.0-49.9, ADULT: ICD-10-CM

## 2025-05-14 DIAGNOSIS — I10 PRIMARY HYPERTENSION: Primary | ICD-10-CM

## 2025-05-14 DIAGNOSIS — R00.2 PALPITATIONS: ICD-10-CM

## 2025-05-14 DIAGNOSIS — R55 NEAR SYNCOPE: ICD-10-CM

## 2025-05-14 PROCEDURE — 99213 OFFICE O/P EST LOW 20 MIN: CPT | Mod: PBBFAC | Performed by: INTERNAL MEDICINE

## 2025-05-14 PROCEDURE — 99999 PR PBB SHADOW E&M-EST. PATIENT-LVL III: CPT | Mod: PBBFAC,,, | Performed by: INTERNAL MEDICINE

## 2025-05-14 RX ORDER — MECLIZINE HYDROCHLORIDE 25 MG/1
25 TABLET ORAL EVERY 6 HOURS
COMMUNITY
Start: 2025-04-22

## 2025-05-14 RX ORDER — CETIRIZINE HYDROCHLORIDE 10 MG/1
10 TABLET ORAL NIGHTLY
COMMUNITY
Start: 2025-02-04

## 2025-05-14 RX ORDER — ESCITALOPRAM OXALATE 20 MG/1
20 TABLET ORAL NIGHTLY
COMMUNITY
Start: 2025-04-23

## 2025-05-14 RX ORDER — METOPROLOL SUCCINATE 25 MG/1
25 TABLET, EXTENDED RELEASE ORAL DAILY
Qty: 90 TABLET | Refills: 3 | Status: SHIPPED | OUTPATIENT
Start: 2025-05-14

## 2025-05-14 NOTE — PROGRESS NOTES
[facility] Cardiology     Subjective:    Patient ID:  Julieth Randhawa is a 29 y.o. female who presents for follow-up of Hypertension, Pre Syncope, and Palpitations    Review of patient's allergies indicates:   Allergen Reactions    Turmeric Anaphylaxis and Other (See Comments)     She is here for similar problems, I assessed her 2022 June for episodes of syncope/near-syncope.  She also had palpitations and chest pain.  A 30 day event monitor was worn.  There was a syncopal event with stable rhythm at that time.  There were also episodes of chest pain with rhythm being sinus tach low-grade.  No significant sustained arrhythmia noted.    She is still having episodes.  It is sporadic.  Her echo in 2022 was normal.  She was recently in the emergency department for blood pressure elevation 160 systolic.  She has been monitoring her blood pressures and they have been elevated 140s commonly.    Today's blood pressure 124/96.  She states her diastolic readings have been elevated more than her systolic readings.  She has normal renal function.  She had a normal Electrocardiogram in the ED when she was there recently for blood pressure elevation.         Review of Systems   Constitutional: Negative for chills, decreased appetite, diaphoresis, fever, malaise/fatigue, night sweats, weight gain and weight loss.   HENT:  Negative for congestion, ear discharge, ear pain, hearing loss, hoarse voice, nosebleeds, odynophagia, sore throat, stridor and tinnitus.    Eyes:  Negative for blurred vision, discharge, double vision, pain, photophobia, redness, vision loss in left eye, vision loss in right eye, visual disturbance and visual halos.   Cardiovascular:  Positive for chest pain, palpitations and syncope. Negative for claudication, cyanosis, dyspnea on exertion, irregular heartbeat, leg swelling, near-syncope, orthopnea and paroxysmal nocturnal dyspnea.    Respiratory:  Negative for cough, hemoptysis, shortness of breath, sleep disturbances due to breathing, snoring, sputum production and wheezing.    Endocrine: Negative for cold intolerance, heat intolerance, polydipsia, polyphagia and polyuria.   Hematologic/Lymphatic: Negative for adenopathy and bleeding problem. Does not bruise/bleed easily.   Skin:  Negative for color change, dry skin, flushing, itching, nail changes, poor wound healing, rash, skin cancer, suspicious lesions and unusual hair distribution.   Musculoskeletal:  Negative for arthritis, back pain, falls, gout, joint pain, joint swelling, muscle cramps, muscle weakness, myalgias, neck pain and stiffness.   Gastrointestinal:  Negative for bloating, abdominal pain, anorexia, change in bowel habit, bowel incontinence, constipation, diarrhea, dysphagia, excessive appetite, flatus, heartburn, hematemesis, hematochezia, hemorrhoids, jaundice, melena, nausea and vomiting.   Genitourinary:  Negative for bladder incontinence, decreased libido, dysuria, flank pain, frequency, genital sores, hematuria, hesitancy, incomplete emptying, nocturia and urgency.   Neurological:  Positive for dizziness. Negative for aphonia, brief paralysis, difficulty with concentration, disturbances in coordination, excessive daytime sleepiness, focal weakness, headaches, light-headedness, loss of balance, numbness, paresthesias, seizures, sensory change, tremors, vertigo and weakness.   Psychiatric/Behavioral:  Negative for altered mental status, depression, hallucinations, memory loss, substance abuse, suicidal ideas and thoughts of violence. The patient is nervous/anxious. The patient does not have insomnia.    Allergic/Immunologic: Negative for hives and persistent infections.        Objective:       Vitals:    05/14/25 1253   BP: (!) 124/96   BP Location: Left forearm   Patient Position: Sitting   Pulse: 102   Resp: 18   SpO2: 96%   Weight: (!) 154.6 kg (340 lb 13.3 oz)  "  Height: 5' 3" (1.6 m)    Physical Exam  Constitutional:       General: She is not in acute distress.     Appearance: She is well-developed. She is not diaphoretic.   HENT:      Head: Normocephalic and atraumatic.      Nose: Nose normal.   Eyes:      General: No scleral icterus.        Right eye: No discharge.      Conjunctiva/sclera: Conjunctivae normal.      Pupils: Pupils are equal, round, and reactive to light.   Neck:      Thyroid: No thyromegaly.      Vascular: No JVD.      Trachea: No tracheal deviation.   Cardiovascular:      Rate and Rhythm: Normal rate and regular rhythm.      Pulses:           Carotid pulses are 2+ on the right side and 2+ on the left side.       Radial pulses are 2+ on the right side and 2+ on the left side.        Dorsalis pedis pulses are 2+ on the right side and 2+ on the left side.        Posterior tibial pulses are 2+ on the right side and 2+ on the left side.      Heart sounds: Normal heart sounds. No murmur heard.     No friction rub. No gallop.   Pulmonary:      Effort: Pulmonary effort is normal. No respiratory distress.      Breath sounds: Normal breath sounds. No stridor. No wheezing or rales.   Chest:      Chest wall: No tenderness.   Abdominal:      General: Bowel sounds are normal. There is no distension.      Palpations: Abdomen is soft. There is no mass.      Tenderness: There is no abdominal tenderness. There is no guarding or rebound.   Musculoskeletal:         General: No tenderness. Normal range of motion.      Cervical back: Normal range of motion and neck supple.   Lymphadenopathy:      Cervical: No cervical adenopathy.   Skin:     General: Skin is warm and dry.      Coloration: Skin is not pale.      Findings: No erythema or rash.   Neurological:      Mental Status: She is alert and oriented to person, place, and time.      Cranial Nerves: No cranial nerve deficit.      Coordination: Coordination normal.   Psychiatric:         Behavior: Behavior normal.         " Thought Content: Thought content normal.         Judgment: Judgment normal.           Assessment:       1. Primary hypertension    2. Near syncope    3. Morbid obesity with BMI of 45.0-49.9, adult    4. Palpitations      Results for orders placed or performed during the hospital encounter of 04/19/25   Urinalysis, Reflex to Urine Culture    Collection Time: 04/19/25  2:15 PM    Specimen: Urine, Clean Catch   Result Value Ref Range    Color, UA Yellow Straw, Isaura, Yellow, Light-Orange    Appearance, UA Clear Clear    pH, UA 7.0 5.0 - 8.0    Spec Grav UA 1.020 1.005 - 1.030    Protein, UA Negative Negative    Glucose, UA Negative Negative    Ketones, UA Negative Negative    Bilirubin, UA Negative Negative    Blood, UA Negative Negative    Nitrites, UA Negative Negative    Urobilinogen, UA Negative <2.0 EU/dL    Leukocyte Esterase, UA Negative Negative   Pregnancy, urine rapid    Collection Time: 04/19/25  2:15 PM   Result Value Ref Range    hCG Qualitative, Urine Negative Negative   GREY TOP URINE HOLD    Collection Time: 04/19/25  2:15 PM   Result Value Ref Range    Extra Tube Hold for add-ons.    EKG 12-lead    Collection Time: 04/19/25  2:18 PM   Result Value Ref Range    QRS Duration 82 ms    [facility] QTC Calculation 453 ms   CBC with Differential    Collection Time: 04/19/25  2:26 PM   Result Value Ref Range    WBC 10.24 3.90 - 12.70 K/uL    RBC 4.81 4.00 - 5.40 M/uL    HGB 13.3 12.0 - 16.0 gm/dL    HCT 40.8 37.0 - 48.5 %    MCV 85 82 - 98 fL    MCH 27.7 27.0 - 31.0 pg    MCHC 32.6 32.0 - 36.0 g/dL    RDW 14.0 11.5 - 14.5 %    Platelet Count 297 150 - 450 K/uL    MPV 11.1 9.2 - 12.9 fL    Nucleated RBC 0 <=0 /100 WBC    Neut % 52.2 38 - 73 %    Lymph % 33.6 18 - 48 %    Mono % 9.7 4 - 15 %    Eos % 3.4 <=8 %    Basophil % 0.8 <=1.9 %    Imm Grans % 0.3 0.0 - 0.5 %    Neut # 5.35 1.8 - 7.7 K/uL    Lymph # 3.44 1 - 4.8 K/uL    Mono # 0.99 0.3 - 1 K/uL    Eos # 0.35 <=0.5 K/uL    Baso # 0.08 <=0.2 K/uL    Imm Grans #  0.03 0.00 - 0.04 K/uL   Comprehensive Metabolic Panel    Collection Time: 04/19/25  2:27 PM   Result Value Ref Range    Sodium 139 136 - 145 mmol/L    Potassium 3.9 3.5 - 5.1 mmol/L    Chloride 108 95 - 110 mmol/L    CO2 20 (L) 23 - 29 mmol/L    Glucose 79 70 - 110 mg/dL    BUN 11 6 - 20 mg/dL    Creatinine 0.7 0.5 - 1.4 mg/dL    Calcium 9.0 8.7 - 10.5 mg/dL    Protein Total 7.3 6.0 - 8.4 gm/dL    Albumin 3.6 3.5 - 5.2 g/dL    Bilirubin Total 0.2 0.1 - 1.0 mg/dL    ALP 89 40 - 150 unit/L    AST 23 11 - 45 unit/L    ALT 26 10 - 44 unit/L    Anion Gap 11 8 - 16 mmol/L    eGFR >60 >60 mL/min/1.73/m2   Lipase    Collection Time: 04/19/25  2:27 PM   Result Value Ref Range    Lipase Level 26 4 - 60 U/L   Troponin I    Collection Time: 04/19/25  2:27 PM   Result Value Ref Range    Troponin-I <0.006 <=0.026 ng/mL   BNP    Collection Time: 04/19/25  2:27 PM   Result Value Ref Range    BNP 45 0 - 99 pg/mL       Current Medications[1]     Lab Results   Component Value Date    WBC 10.24 04/19/2025    RBC 4.81 04/19/2025    HGB 13.3 04/19/2025    HCT 40.8 04/19/2025    MCV 85 04/19/2025    MCH 27.7 04/19/2025    MCHC 32.6 04/19/2025    RDW 14.0 04/19/2025     04/19/2025    MPV 11.1 04/19/2025    GRAN 7.2 08/16/2024    GRAN 71.0 08/16/2024    LYMPH 33.6 04/19/2025    LYMPH 3.44 04/19/2025    MONO 9.7 04/19/2025    MONO 0.99 04/19/2025    EOS 3.4 04/19/2025    EOS 0.35 04/19/2025    BASO 0.03 08/16/2024    EOSINOPHIL 3.1 08/16/2024    BASOPHIL 0.8 04/19/2025    BASOPHIL 0.08 04/19/2025        CMP  Lab Results   Component Value Date     04/19/2025    K 3.9 04/19/2025     04/19/2025    CO2 20 (L) 04/19/2025    GLU 79 04/19/2025    BUN 11 04/19/2025    CREATININE 0.7 04/19/2025    CALCIUM 9.0 04/19/2025    PROT 7.3 04/19/2025    ALBUMIN 3.6 04/19/2025    BILITOT 0.2 04/19/2025    ALKPHOS 89 04/19/2025    AST 23 04/19/2025    ALT 26 04/19/2025    ANIONGAP 11 04/19/2025    ESTGFRAFRICA >60 05/03/2022    EGFRNONAA >60  "05/03/2022        No results found for: "LABBLOO", "LABURIN", "RESPIRATORYC", "GSRESP"         Results for orders placed or performed during the hospital encounter of 04/19/25   EKG 12-lead    Collection Time: 04/19/25  2:18 PM   Result Value Ref Range    QRS Duration 82 ms    [facility] QTC Calculation 453 ms    Narrative    Test Reason : I10,    Vent. Rate :  73 BPM     Atrial Rate :  73 BPM     P-R Int : 162 ms          QRS Dur :  82 ms      QT Int : 412 ms       P-R-T Axes :  32  18  43 degrees    QTcB Int : 453 ms    Normal sinus rhythm  Normal ECG  When compared with ECG of 16-Aug-2024 07:50,  No significant change was found  Confirmed by Vineet Tello ([id number]) on 4/22/2025 7:38:54 AM    Referred By: AAAREFERRAL SELF           Confirmed By: Vineet Tello                   Plan:       Problem List Items Addressed This Visit          Cardiac/Vascular    Primary hypertension - Primary    Her readings are documented elevated.  Today's blood pressure 124/96 mm Hg.  Toprol 25 mg prescribed.  Side effects discussed.  She will take it at bedtime.  She monitors at home and will keep me posted as to the readings.    I selected Toprol because of palpitation complaints.         Relevant Medications    metoprolol succinate (TOPROL-XL) 25 MG 24 hr tablet    Palpitations    A continuing complaint.  Her Holter did show rare ectopy.  She also has complaints of chest pain with sinus tachycardia which may benefit from Toprol therapy.\    Condition unchanged.            Endocrine    Morbid obesity with BMI of 45.0-49.9, adult    Weight loss encouraged.            Other    Near syncope    Possibly related to vasovagal events.  Reassurance provided.  Her event monitor in the past confirmed normal heart rates at time of syncope.    No further monitoring of heart rhythm recommended at this time.  Her echo showed normal EF and function.               I will see her back in a month.    I think there are features of vasovagal events explaining " her near-syncope.    Toprol 25 mg q.h.s. ordered for blood pressure control.             Vineet Tello MD  05/14/2025   1:23 PM               [1]   Current Outpatient Medications:     cariprazine HCl (VRAYLAR ORAL), Take 0.5 mg by mouth every other day. nightly, Disp: , Rfl:     cetirizine (ZYRTEC) 10 MG tablet, Take 10 mg by mouth every evening., Disp: , Rfl:     clonazePAM (KLONOPIN) 0.5 MG tablet, Take 0.5 mg by mouth every evening., Disp: , Rfl:     EScitalopram oxalate (LEXAPRO) 20 MG tablet, Take 20 mg by mouth every evening., Disp: , Rfl:     meclizine (ANTIVERT) 25 mg tablet, Take 25 mg by mouth every 6 (six) hours., Disp: , Rfl:     prazosin (MINIPRESS) 1 MG Cap, Take 1 mg by mouth nightly as needed., Disp: , Rfl:     buPROPion (WELLBUTRIN XL) 150 MG TB24 tablet, Take 1 tablet (150 mg total) by mouth once daily. (Patient taking differently: Take 150 mg by mouth every evening.), Disp: 30 tablet, Rfl: 11    gabapentin (NEURONTIN) 100 MG capsule, Take 1 capsule (100 mg total) by mouth 2 (two) times daily., Disp: 60 capsule, Rfl: 1    metoprolol succinate (TOPROL-XL) 25 MG 24 hr tablet, Take 1 tablet (25 mg total) by mouth once daily., Disp: 90 tablet, Rfl: 3

## 2025-05-14 NOTE — ASSESSMENT & PLAN NOTE
Possibly related to vasovagal events.  Reassurance provided.  Her event monitor in the past confirmed normal heart rates at time of syncope.    No further monitoring of heart rhythm recommended at this time.  Her echo showed normal EF and function.

## 2025-05-14 NOTE — ASSESSMENT & PLAN NOTE
A continuing complaint.  Her Holter did show rare ectopy.  She also has complaints of chest pain with sinus tachycardia which may benefit from Toprol therapy.\    Condition unchanged.

## 2025-05-14 NOTE — ASSESSMENT & PLAN NOTE
Her readings are documented elevated.  Today's blood pressure 124/96 mm Hg.  Toprol 25 mg prescribed.  Side effects discussed.  She will take it at bedtime.  She monitors at home and will keep me posted as to the readings.    I selected Toprol because of palpitation complaints.

## 2025-06-11 ENCOUNTER — OFFICE VISIT (OUTPATIENT)
Dept: CARDIOLOGY | Facility: CLINIC | Age: 30
End: 2025-06-11
Payer: MEDICAID

## 2025-06-11 VITALS
HEIGHT: 63 IN | SYSTOLIC BLOOD PRESSURE: 128 MMHG | HEART RATE: 78 BPM | RESPIRATION RATE: 18 BRPM | DIASTOLIC BLOOD PRESSURE: 80 MMHG | OXYGEN SATURATION: 98 % | BODY MASS INDEX: 51.91 KG/M2 | WEIGHT: 293 LBS

## 2025-06-11 DIAGNOSIS — R55 NEAR SYNCOPE: ICD-10-CM

## 2025-06-11 DIAGNOSIS — R00.2 PALPITATIONS: ICD-10-CM

## 2025-06-11 DIAGNOSIS — I10 PRIMARY HYPERTENSION: Primary | ICD-10-CM

## 2025-06-11 DIAGNOSIS — R07.89 OTHER CHEST PAIN: ICD-10-CM

## 2025-06-11 DIAGNOSIS — E66.01 MORBID OBESITY WITH BMI OF 45.0-49.9, ADULT: ICD-10-CM

## 2025-06-11 PROCEDURE — 99213 OFFICE O/P EST LOW 20 MIN: CPT | Mod: PBBFAC | Performed by: INTERNAL MEDICINE

## 2025-06-11 PROCEDURE — 99999 PR PBB SHADOW E&M-EST. PATIENT-LVL III: CPT | Mod: PBBFAC,,, | Performed by: INTERNAL MEDICINE

## 2025-06-11 NOTE — PROGRESS NOTES
[facility]     Subjective:    Patient ID:  Julieth Randhawa is a 30 y.o. female who presents for follow-up of Hypertension, Pre Syncope, Chest Pain, and Palpitations    Review of patient's allergies indicates:   Allergen Reactions    Turmeric Anaphylaxis and Other (See Comments)     She returns for reassessment.  She was diagnosed with hypertension and episodes of near-syncope that I suspected were vasovagal.  She also had complaints of palpitations reported previously.  Toprol 25 mg has been taken and she reports a positive response.    She has only had to light episodes of near-syncope.  Previously she was having daily events.  She is not on compression stockings or midodrine.  Her blood pressure is 128/80 today.  She is happier with her blood pressure readings.  No palpitations reported.         Review of Systems   Constitutional: Negative for chills, decreased appetite, diaphoresis, fever, malaise/fatigue, night sweats, weight gain and weight loss.   HENT:  Negative for congestion, ear discharge, ear pain, hearing loss, hoarse voice, nosebleeds, odynophagia, sore throat, stridor and tinnitus.    Eyes:  Negative for blurred vision, discharge, double vision, pain, photophobia, redness, vision loss in left eye, vision loss in right eye, visual disturbance and visual halos.   Cardiovascular:  Positive for chest pain and syncope. Negative for claudication, cyanosis, dyspnea on exertion, irregular heartbeat, leg swelling, near-syncope, orthopnea and paroxysmal nocturnal dyspnea.   Respiratory:  Negative for cough, hemoptysis, shortness of breath, sleep disturbances due to breathing, snoring, sputum production and wheezing.    Endocrine: Negative for cold intolerance, heat intolerance, polydipsia, polyphagia and polyuria.   Hematologic/Lymphatic: Negative for adenopathy and bleeding problem. Does not bruise/bleed easily.   Skin:   "Negative for color change, dry skin, flushing, itching, nail changes, poor wound healing, rash, skin cancer, suspicious lesions and unusual hair distribution.   Musculoskeletal:  Negative for arthritis, back pain, falls, gout, joint pain, joint swelling, muscle cramps, muscle weakness, myalgias, neck pain and stiffness.   Gastrointestinal:  Negative for bloating, abdominal pain, anorexia, change in bowel habit, bowel incontinence, constipation, diarrhea, dysphagia, excessive appetite, flatus, heartburn, hematemesis, hematochezia, hemorrhoids, jaundice, melena, nausea and vomiting.   Genitourinary:  Negative for bladder incontinence, decreased libido, dysuria, flank pain, frequency, genital sores, hematuria, hesitancy, incomplete emptying, nocturia and urgency.   Neurological:  Positive for dizziness. Negative for aphonia, brief paralysis, difficulty with concentration, disturbances in coordination, excessive daytime sleepiness, focal weakness, headaches, light-headedness, loss of balance, numbness, paresthesias, seizures, sensory change, tremors, vertigo and weakness.   Psychiatric/Behavioral:  Negative for altered mental status, depression, hallucinations, memory loss, substance abuse, suicidal ideas and thoughts of violence. The patient is nervous/anxious. The patient does not have insomnia.    Allergic/Immunologic: Negative for hives and persistent infections.        Objective:       Vitals:    06/11/25 1028   BP: 128/80   BP Location: Left forearm   Patient Position: Sitting   Pulse: 78   Resp: 18   SpO2: 98%   Weight: (!) 154 kg (339 lb 8.1 oz)   Height: 5' 3" (1.6 m)    Physical Exam  Constitutional:       General: She is not in acute distress.     Appearance: She is well-developed. She is not diaphoretic.   HENT:      Head: Normocephalic and atraumatic.      Nose: Nose normal.   Eyes:      General: No scleral icterus.        Right eye: No discharge.      Conjunctiva/sclera: Conjunctivae normal.      Pupils: " Pupils are equal, round, and reactive to light.   Neck:      Thyroid: No thyromegaly.      Vascular: No JVD.      Trachea: No tracheal deviation.   Cardiovascular:      Rate and Rhythm: Normal rate and regular rhythm.      Pulses:           Carotid pulses are 2+ on the right side and 2+ on the left side.       Radial pulses are 2+ on the right side and 2+ on the left side.        Dorsalis pedis pulses are 2+ on the right side and 2+ on the left side.        Posterior tibial pulses are 2+ on the right side and 2+ on the left side.      Heart sounds: Normal heart sounds. No murmur heard.     No friction rub. No gallop.   Pulmonary:      Effort: Pulmonary effort is normal. No respiratory distress.      Breath sounds: Normal breath sounds. No stridor. No wheezing or rales.   Chest:      Chest wall: No tenderness.   Abdominal:      General: Bowel sounds are normal. There is no distension.      Palpations: Abdomen is soft. There is no mass.      Tenderness: There is no abdominal tenderness. There is no guarding or rebound.   Musculoskeletal:         General: No tenderness. Normal range of motion.      Cervical back: Normal range of motion and neck supple.   Lymphadenopathy:      Cervical: No cervical adenopathy.   Skin:     General: Skin is warm and dry.      Coloration: Skin is not pale.      Findings: No erythema or rash.   Neurological:      Mental Status: She is alert and oriented to person, place, and time.      Cranial Nerves: No cranial nerve deficit.      Coordination: Coordination normal.   Psychiatric:         Behavior: Behavior normal.         Thought Content: Thought content normal.         Judgment: Judgment normal.           Assessment:       1. Primary hypertension    2. Palpitations    3. Other chest pain    4. Morbid obesity with BMI of 45.0-49.9, adult    5. Near syncope      Results for orders placed or performed during the hospital encounter of 04/19/25   Urinalysis, Reflex to Urine Culture     Collection Time: 04/19/25  2:15 PM    Specimen: Urine, Clean Catch   Result Value Ref Range    Color, UA Yellow Straw, Isaura, Yellow, Light-Orange    Appearance, UA Clear Clear    pH, UA 7.0 5.0 - 8.0    Spec Grav UA 1.020 1.005 - 1.030    Protein, UA Negative Negative    Glucose, UA Negative Negative    Ketones, UA Negative Negative    Bilirubin, UA Negative Negative    Blood, UA Negative Negative    Nitrites, UA Negative Negative    Urobilinogen, UA Negative <2.0 EU/dL    Leukocyte Esterase, UA Negative Negative   Pregnancy, urine rapid    Collection Time: 04/19/25  2:15 PM   Result Value Ref Range    hCG Qualitative, Urine Negative Negative   GREY TOP URINE HOLD    Collection Time: 04/19/25  2:15 PM   Result Value Ref Range    Extra Tube Hold for add-ons.    EKG 12-lead    Collection Time: 04/19/25  2:18 PM   Result Value Ref Range    QRS Duration 82 ms    [facility] QTC Calculation 453 ms   CBC with Differential    Collection Time: 04/19/25  2:26 PM   Result Value Ref Range    WBC 10.24 3.90 - 12.70 K/uL    RBC 4.81 4.00 - 5.40 M/uL    HGB 13.3 12.0 - 16.0 gm/dL    HCT 40.8 37.0 - 48.5 %    MCV 85 82 - 98 fL    MCH 27.7 27.0 - 31.0 pg    MCHC 32.6 32.0 - 36.0 g/dL    RDW 14.0 11.5 - 14.5 %    Platelet Count 297 150 - 450 K/uL    MPV 11.1 9.2 - 12.9 fL    Nucleated RBC 0 <=0 /100 WBC    Neut % 52.2 38 - 73 %    Lymph % 33.6 18 - 48 %    Mono % 9.7 4 - 15 %    Eos % 3.4 <=8 %    Basophil % 0.8 <=1.9 %    Imm Grans % 0.3 0.0 - 0.5 %    Neut # 5.35 1.8 - 7.7 K/uL    Lymph # 3.44 1 - 4.8 K/uL    Mono # 0.99 0.3 - 1 K/uL    Eos # 0.35 <=0.5 K/uL    Baso # 0.08 <=0.2 K/uL    Imm Grans # 0.03 0.00 - 0.04 K/uL   Comprehensive Metabolic Panel    Collection Time: 04/19/25  2:27 PM   Result Value Ref Range    Sodium 139 136 - 145 mmol/L    Potassium 3.9 3.5 - 5.1 mmol/L    Chloride 108 95 - 110 mmol/L    CO2 20 (L) 23 - 29 mmol/L    Glucose 79 70 - 110 mg/dL    BUN 11 6 - 20 mg/dL    Creatinine 0.7 0.5 - 1.4 mg/dL    Calcium 9.0  "8.7 - 10.5 mg/dL    Protein Total 7.3 6.0 - 8.4 gm/dL    Albumin 3.6 3.5 - 5.2 g/dL    Bilirubin Total 0.2 0.1 - 1.0 mg/dL    ALP 89 40 - 150 unit/L    AST 23 11 - 45 unit/L    ALT 26 10 - 44 unit/L    Anion Gap 11 8 - 16 mmol/L    eGFR >60 >60 mL/min/1.73/m2   Lipase    Collection Time: 04/19/25  2:27 PM   Result Value Ref Range    Lipase Level 26 4 - 60 U/L   Troponin I    Collection Time: 04/19/25  2:27 PM   Result Value Ref Range    Troponin-I <0.006 <=0.026 ng/mL   BNP    Collection Time: 04/19/25  2:27 PM   Result Value Ref Range    BNP 45 0 - 99 pg/mL       Current Medications[1]     Lab Results   Component Value Date    WBC 10.24 04/19/2025    RBC 4.81 04/19/2025    HGB 13.3 04/19/2025    HCT 40.8 04/19/2025    MCV 85 04/19/2025    MCH 27.7 04/19/2025    MCHC 32.6 04/19/2025    RDW 14.0 04/19/2025     04/19/2025    MPV 11.1 04/19/2025    GRAN 7.2 08/16/2024    GRAN 71.0 08/16/2024    LYMPH 33.6 04/19/2025    LYMPH 3.44 04/19/2025    MONO 9.7 04/19/2025    MONO 0.99 04/19/2025    EOS 3.4 04/19/2025    EOS 0.35 04/19/2025    BASO 0.03 08/16/2024    EOSINOPHIL 3.1 08/16/2024    BASOPHIL 0.8 04/19/2025    BASOPHIL 0.08 04/19/2025        CMP  Lab Results   Component Value Date     04/19/2025    K 3.9 04/19/2025     04/19/2025    CO2 20 (L) 04/19/2025    GLU 79 04/19/2025    BUN 11 04/19/2025    CREATININE 0.7 04/19/2025    CALCIUM 9.0 04/19/2025    PROT 7.3 04/19/2025    ALBUMIN 3.6 04/19/2025    BILITOT 0.2 04/19/2025    ALKPHOS 89 04/19/2025    AST 23 04/19/2025    ALT 26 04/19/2025    ANIONGAP 11 04/19/2025    ESTGFRAFRICA >60 05/03/2022    EGFRNONAA >60 05/03/2022        No results found for: "LABBLOO", "LABURIN", "RESPIRATORYC", "GSRESP"         Results for orders placed or performed during the hospital encounter of 04/19/25   EKG 12-lead    Collection Time: 04/19/25  2:18 PM   Result Value Ref Range    QRS Duration 82 ms    [facility] QTC Calculation 453 ms    Narrative    Test Reason : " I10,    Vent. Rate :  73 BPM     Atrial Rate :  73 BPM     P-R Int : 162 ms          QRS Dur :  82 ms      QT Int : 412 ms       P-R-T Axes :  32  18  43 degrees    QTcB Int : 453 ms    Normal sinus rhythm  Normal ECG  When compared with ECG of 16-Aug-2024 07:50,  No significant change was found  Confirmed by Vineet Tello ([id number]) on 4/22/2025 7:38:54 AM    Referred By: AAAREFERRAL SELF           Confirmed By: Vineet Tello                   Plan:       Problem List Items Addressed This Visit          Cardiac/Vascular    Other chest pain    Possibly related to palpitations.  She has anxiety issues.         Primary hypertension - Primary    Toprol 25 mg to continue.  Her blood pressure is controlled better.  She has been monitoring at home.  No side effects related to the medication reported.         Palpitations    No complaints of palpitations today.  Her Holter showed rare ectopy previously.                Endocrine    Morbid obesity with BMI of 45.0-49.9, adult    Weight loss encouraged.            Other    Near syncope    Possibly vasovagal.  No recurrent episodes of significance since I started Toprol.  She has had to light episodes reported.               She is doing better on Toprol.  Current dose to continue.    One year follow-up made.    Further education explained to the patient regarding vasovagal events.             Vineet Tello MD  06/11/2025   11:07 AM               [1]   Current Outpatient Medications:     buPROPion (WELLBUTRIN XL) 150 MG TB24 tablet, Take 1 tablet (150 mg total) by mouth once daily., Disp: 30 tablet, Rfl: 11    cariprazine HCl (VRAYLAR ORAL), Take 0.5 mg by mouth every other day. nightly, Disp: , Rfl:     cetirizine (ZYRTEC) 10 MG tablet, Take 10 mg by mouth every evening., Disp: , Rfl:     clonazePAM (KLONOPIN) 0.5 MG tablet, Take 0.5 mg by mouth every evening., Disp: , Rfl:     EScitalopram oxalate (LEXAPRO) 20 MG tablet, Take 20 mg by mouth every evening., Disp: , Rfl:      gabapentin (NEURONTIN) 100 MG capsule, Take 1 capsule (100 mg total) by mouth 2 (two) times daily., Disp: 60 capsule, Rfl: 1    meclizine (ANTIVERT) 25 mg tablet, Take 25 mg by mouth every 6 (six) hours., Disp: , Rfl:     metoprolol succinate (TOPROL-XL) 25 MG 24 hr tablet, Take 1 tablet (25 mg total) by mouth once daily., Disp: 90 tablet, Rfl: 3    prazosin (MINIPRESS) 1 MG Cap, Take 1 mg by mouth nightly as needed., Disp: , Rfl:

## 2025-06-11 NOTE — ASSESSMENT & PLAN NOTE
Toprol 25 mg to continue.  Her blood pressure is controlled better.  She has been monitoring at home.  No side effects related to the medication reported.

## 2025-06-11 NOTE — ASSESSMENT & PLAN NOTE
Possibly vasovagal.  No recurrent episodes of significance since I started Toprol.  She has had to light episodes reported.

## 2025-07-16 ENCOUNTER — OFFICE VISIT (OUTPATIENT)
Dept: CARDIOLOGY | Facility: CLINIC | Age: 30
End: 2025-07-16
Payer: MEDICAID

## 2025-07-16 VITALS
BODY MASS INDEX: 51.91 KG/M2 | DIASTOLIC BLOOD PRESSURE: 92 MMHG | WEIGHT: 293 LBS | HEART RATE: 78 BPM | RESPIRATION RATE: 18 BRPM | HEIGHT: 63 IN | SYSTOLIC BLOOD PRESSURE: 118 MMHG | OXYGEN SATURATION: 98 %

## 2025-07-16 DIAGNOSIS — R00.2 PALPITATIONS: ICD-10-CM

## 2025-07-16 DIAGNOSIS — I10 PRIMARY HYPERTENSION: Primary | ICD-10-CM

## 2025-07-16 DIAGNOSIS — Z01.818 PREOPERATIVE CLEARANCE: ICD-10-CM

## 2025-07-16 DIAGNOSIS — R07.89 OTHER CHEST PAIN: ICD-10-CM

## 2025-07-16 PROCEDURE — 99999 PR PBB SHADOW E&M-EST. PATIENT-LVL III: CPT | Mod: PBBFAC,,, | Performed by: INTERNAL MEDICINE

## 2025-07-16 PROCEDURE — 99213 OFFICE O/P EST LOW 20 MIN: CPT | Mod: PBBFAC | Performed by: INTERNAL MEDICINE

## 2025-07-16 NOTE — PROGRESS NOTES
[facility] Cardiology     Subjective:    Patient ID:  Julieth Randhawa is a 30 y.o. female who presents for follow-up of Pre-op Exam (Clearance for gastric sleeve), Palpitations, and Hypertension    Review of patient's allergies indicates:   Allergen Reactions    Turmeric Anaphylaxis and Other (See Comments)     She was recently seen for palpitations.  She has a history of mild hypertension.  Toprol was initiated.  She is doing well with treatment.  She was to be seen in August.  She moved up the appointment because she needs surgical clearance.  She is having bariatric surgery at [facility].  Clearance was requested by Cardiology Internal Medicine and Psychiatry.    She states her palpitations have resolved.  She feels good.  The blood pressure pill Toprol 25 mg that I gave her is tolerated well.  She is not sure when her surgery will be.         Review of Systems   Constitutional: Negative for chills, decreased appetite, diaphoresis, fever, malaise/fatigue, night sweats, weight gain and weight loss.   HENT:  Negative for congestion, ear discharge, ear pain, hearing loss, hoarse voice, nosebleeds, odynophagia, sore throat, stridor and tinnitus.    Eyes:  Negative for blurred vision, discharge, double vision, pain, photophobia, redness, vision loss in left eye, vision loss in right eye, visual disturbance and visual halos.   Cardiovascular:  Negative for chest pain, claudication, cyanosis, dyspnea on exertion, irregular heartbeat, leg swelling, near-syncope, orthopnea, palpitations, paroxysmal nocturnal dyspnea and syncope.   Respiratory:  Negative for cough, hemoptysis, shortness of breath, sleep disturbances due to breathing, snoring, sputum production and wheezing.    Endocrine: Negative for cold intolerance, heat intolerance, polydipsia, polyphagia and polyuria.   Hematologic/Lymphatic: Negative for adenopathy and bleeding  "problem. Does not bruise/bleed easily.   Skin:  Negative for color change, dry skin, flushing, itching, nail changes, poor wound healing, rash, skin cancer, suspicious lesions and unusual hair distribution.   Musculoskeletal:  Negative for arthritis, back pain, falls, gout, joint pain, joint swelling, muscle cramps, muscle weakness, myalgias, neck pain and stiffness.   Gastrointestinal:  Negative for bloating, abdominal pain, anorexia, change in bowel habit, bowel incontinence, constipation, diarrhea, dysphagia, excessive appetite, flatus, heartburn, hematemesis, hematochezia, hemorrhoids, jaundice, melena, nausea and vomiting.   Genitourinary:  Negative for bladder incontinence, decreased libido, dysuria, flank pain, frequency, genital sores, hematuria, hesitancy, incomplete emptying, nocturia and urgency.   Neurological:  Negative for aphonia, brief paralysis, difficulty with concentration, disturbances in coordination, excessive daytime sleepiness, dizziness, focal weakness, headaches, light-headedness, loss of balance, numbness, paresthesias, seizures, sensory change, tremors, vertigo and weakness.   Psychiatric/Behavioral:  Negative for altered mental status, depression, hallucinations, memory loss, substance abuse, suicidal ideas and thoughts of violence. The patient does not have insomnia and is not nervous/anxious.    Allergic/Immunologic: Negative for hives and persistent infections.        Objective:       Vitals:    07/16/25 1121   BP: (!) 118/92   BP Location: Left arm   Patient Position: Sitting   Pulse: 78   Resp: 18   SpO2: 98%   Weight: (!) 155.5 kg (342 lb 13 oz)   Height: 5' 3" (1.6 m)    Physical Exam  Constitutional:       General: She is not in acute distress.     Appearance: She is well-developed. She is not diaphoretic.   HENT:      Head: Normocephalic and atraumatic.      Nose: Nose normal.   Eyes:      General: No scleral icterus.        Right eye: No discharge.      Conjunctiva/sclera: " Conjunctivae normal.      Pupils: Pupils are equal, round, and reactive to light.   Neck:      Thyroid: No thyromegaly.      Vascular: No JVD.      Trachea: No tracheal deviation.   Cardiovascular:      Rate and Rhythm: Normal rate and regular rhythm.      Pulses:           Carotid pulses are 2+ on the right side and 2+ on the left side.       Radial pulses are 2+ on the right side and 2+ on the left side.        Dorsalis pedis pulses are 2+ on the right side and 2+ on the left side.        Posterior tibial pulses are 2+ on the right side and 2+ on the left side.      Heart sounds: Normal heart sounds. No murmur heard.     No friction rub. No gallop.   Pulmonary:      Effort: Pulmonary effort is normal. No respiratory distress.      Breath sounds: Normal breath sounds. No stridor. No wheezing or rales.   Chest:      Chest wall: No tenderness.   Abdominal:      General: Bowel sounds are normal. There is no distension.      Palpations: Abdomen is soft. There is no mass.      Tenderness: There is no abdominal tenderness. There is no guarding or rebound.   Musculoskeletal:         General: No tenderness. Normal range of motion.      Cervical back: Normal range of motion and neck supple.   Lymphadenopathy:      Cervical: No cervical adenopathy.   Skin:     General: Skin is warm and dry.      Coloration: Skin is not pale.      Findings: No erythema or rash.   Neurological:      Mental Status: She is alert and oriented to person, place, and time.      Cranial Nerves: No cranial nerve deficit.      Coordination: Coordination normal.   Psychiatric:         Behavior: Behavior normal.         Thought Content: Thought content normal.         Judgment: Judgment normal.           Assessment:       1. Primary hypertension    2. Palpitations    3. Other chest pain    4. Preoperative clearance      Results for orders placed or performed during the hospital encounter of 04/19/25   Urinalysis, Reflex to Urine Culture    Collection  Time: 04/19/25  2:15 PM    Specimen: Urine, Clean Catch   Result Value Ref Range    Color, UA Yellow Straw, Isaura, Yellow, Light-Orange    Appearance, UA Clear Clear    pH, UA 7.0 5.0 - 8.0    Spec Grav UA 1.020 1.005 - 1.030    Protein, UA Negative Negative    Glucose, UA Negative Negative    Ketones, UA Negative Negative    Bilirubin, UA Negative Negative    Blood, UA Negative Negative    Nitrites, UA Negative Negative    Urobilinogen, UA Negative <2.0 EU/dL    Leukocyte Esterase, UA Negative Negative   Pregnancy, urine rapid    Collection Time: 04/19/25  2:15 PM   Result Value Ref Range    hCG Qualitative, Urine Negative Negative   GREY TOP URINE HOLD    Collection Time: 04/19/25  2:15 PM   Result Value Ref Range    Extra Tube Hold for add-ons.    EKG 12-lead    Collection Time: 04/19/25  2:18 PM   Result Value Ref Range    QRS Duration 82 ms    [facility] QTC Calculation 453 ms   CBC with Differential    Collection Time: 04/19/25  2:26 PM   Result Value Ref Range    WBC 10.24 3.90 - 12.70 K/uL    RBC 4.81 4.00 - 5.40 M/uL    HGB 13.3 12.0 - 16.0 gm/dL    HCT 40.8 37.0 - 48.5 %    MCV 85 82 - 98 fL    MCH 27.7 27.0 - 31.0 pg    MCHC 32.6 32.0 - 36.0 g/dL    RDW 14.0 11.5 - 14.5 %    Platelet Count 297 150 - 450 K/uL    MPV 11.1 9.2 - 12.9 fL    Nucleated RBC 0 <=0 /100 WBC    Neut % 52.2 38 - 73 %    Lymph % 33.6 18 - 48 %    Mono % 9.7 4 - 15 %    Eos % 3.4 <=8 %    Basophil % 0.8 <=1.9 %    Imm Grans % 0.3 0.0 - 0.5 %    Neut # 5.35 1.8 - 7.7 K/uL    Lymph # 3.44 1 - 4.8 K/uL    Mono # 0.99 0.3 - 1 K/uL    Eos # 0.35 <=0.5 K/uL    Baso # 0.08 <=0.2 K/uL    Imm Grans # 0.03 0.00 - 0.04 K/uL   Comprehensive Metabolic Panel    Collection Time: 04/19/25  2:27 PM   Result Value Ref Range    Sodium 139 136 - 145 mmol/L    Potassium 3.9 3.5 - 5.1 mmol/L    Chloride 108 95 - 110 mmol/L    CO2 20 (L) 23 - 29 mmol/L    Glucose 79 70 - 110 mg/dL    BUN 11 6 - 20 mg/dL    Creatinine 0.7 0.5 - 1.4 mg/dL    Calcium 9.0 8.7 - 10.5  "mg/dL    Protein Total 7.3 6.0 - 8.4 gm/dL    Albumin 3.6 3.5 - 5.2 g/dL    Bilirubin Total 0.2 0.1 - 1.0 mg/dL    ALP 89 40 - 150 unit/L    AST 23 11 - 45 unit/L    ALT 26 10 - 44 unit/L    Anion Gap 11 8 - 16 mmol/L    eGFR >60 >60 mL/min/1.73/m2   Lipase    Collection Time: 04/19/25  2:27 PM   Result Value Ref Range    Lipase Level 26 4 - 60 U/L   Troponin I    Collection Time: 04/19/25  2:27 PM   Result Value Ref Range    Troponin-I <0.006 <=0.026 ng/mL   BNP    Collection Time: 04/19/25  2:27 PM   Result Value Ref Range    BNP 45 0 - 99 pg/mL       Current Medications[1]     Lab Results   Component Value Date    WBC 10.24 04/19/2025    RBC 4.81 04/19/2025    HGB 13.3 04/19/2025    HCT 40.8 04/19/2025    MCV 85 04/19/2025    MCH 27.7 04/19/2025    MCHC 32.6 04/19/2025    RDW 14.0 04/19/2025     04/19/2025    MPV 11.1 04/19/2025    GRAN 7.2 08/16/2024    GRAN 71.0 08/16/2024    LYMPH 33.6 04/19/2025    LYMPH 3.44 04/19/2025    MONO 9.7 04/19/2025    MONO 0.99 04/19/2025    EOS 3.4 04/19/2025    EOS 0.35 04/19/2025    BASO 0.03 08/16/2024    EOSINOPHIL 3.1 08/16/2024    BASOPHIL 0.8 04/19/2025    BASOPHIL 0.08 04/19/2025        CMP  Lab Results   Component Value Date     04/19/2025    K 3.9 04/19/2025     04/19/2025    CO2 20 (L) 04/19/2025    GLU 79 04/19/2025    BUN 11 04/19/2025    CREATININE 0.7 04/19/2025    CALCIUM 9.0 04/19/2025    PROT 7.3 04/19/2025    ALBUMIN 3.6 04/19/2025    BILITOT 0.2 04/19/2025    ALKPHOS 89 04/19/2025    AST 23 04/19/2025    ALT 26 04/19/2025    ANIONGAP 11 04/19/2025    ESTGFRAFRICA >60 05/03/2022    EGFRNONAA >60 05/03/2022        No results found for: "LABBLOO", "LABURIN", "RESPIRATORYC", "GSRESP"         Results for orders placed or performed during the hospital encounter of 04/19/25   EKG 12-lead    Collection Time: 04/19/25  2:18 PM   Result Value Ref Range    QRS Duration 82 ms    [facility] QTC Calculation 453 ms    Narrative    Test Reason : I10,    Vent. Rate " :  73 BPM     Atrial Rate :  73 BPM     P-R Int : 162 ms          QRS Dur :  82 ms      QT Int : 412 ms       P-R-T Axes :  32  18  43 degrees    QTcB Int : 453 ms    Normal sinus rhythm  Normal ECG  When compared with ECG of 16-Aug-2024 07:50,  No significant change was found  Confirmed by Vineet Tello ([id number]) on 4/22/2025 7:38:54 AM    Referred By: AAAREFERRAL SELF           Confirmed By: Vineet Tello                   Plan:       Problem List Items Addressed This Visit          Cardiac/Vascular    Other chest pain    Related to palpitations and anxiety.  Reassurance provided.  Her previous echo in 2022 showed normal ejection fraction.         Primary hypertension - Primary    Toprol to continue.  She has had a favorable response to treatment.  Blood pressure today 118/92 mm Hg.  No changes advised.         Palpitations    Event monitor in 2022 was negative.  No complaints of palpitations currently.  Trigger for palpitations may have been sinus tachycardia.            Other    Preoperative clearance    The patient is cleared at this for upcoming bariatric surgery.  Clearance letter signed.               Toprol tolerated well.      Four-month follow-up made.    Clearance given for upcoming bariatric surgery.             Vineet Tello MD  07/16/2025   11:35 AM               [1]   Current Outpatient Medications:     buPROPion (WELLBUTRIN XL) 150 MG TB24 tablet, Take 1 tablet (150 mg total) by mouth once daily., Disp: 30 tablet, Rfl: 11    cariprazine HCl (VRAYLAR ORAL), Take 0.5 mg by mouth every other day. nightly, Disp: , Rfl:     cetirizine (ZYRTEC) 10 MG tablet, Take 10 mg by mouth every evening., Disp: , Rfl:     clonazePAM (KLONOPIN) 0.5 MG tablet, Take 0.5 mg by mouth every evening., Disp: , Rfl:     EScitalopram oxalate (LEXAPRO) 20 MG tablet, Take 20 mg by mouth every evening., Disp: , Rfl:     gabapentin (NEURONTIN) 100 MG capsule, Take 1 capsule (100 mg total) by mouth 2 (two) times daily., Disp: 60  capsule, Rfl: 1    meclizine (ANTIVERT) 25 mg tablet, Take 25 mg by mouth every 6 (six) hours., Disp: , Rfl:     metoprolol succinate (TOPROL-XL) 25 MG 24 hr tablet, Take 1 tablet (25 mg total) by mouth once daily., Disp: 90 tablet, Rfl: 3    prazosin (MINIPRESS) 1 MG Cap, Take 1 mg by mouth nightly as needed., Disp: , Rfl:

## 2025-07-16 NOTE — ASSESSMENT & PLAN NOTE
Toprol to continue.  She has had a favorable response to treatment.  Blood pressure today 118/92 mm Hg.  No changes advised.

## 2025-07-16 NOTE — ASSESSMENT & PLAN NOTE
Event monitor in 2022 was negative.  No complaints of palpitations currently.  Trigger for palpitations may have been sinus tachycardia.

## 2025-07-16 NOTE — ASSESSMENT & PLAN NOTE
Related to palpitations and anxiety.  Reassurance provided.  Her previous echo in 2022 showed normal ejection fraction.

## 2025-07-18 ENCOUNTER — HOSPITAL ENCOUNTER (OUTPATIENT)
Dept: RADIOLOGY | Facility: HOSPITAL | Age: 30
Discharge: HOME | End: 2025-07-18
Attending: NURSE PRACTITIONER
Payer: MEDICAID

## 2025-07-18 DIAGNOSIS — Z01.812 PRE-PROCEDURAL LABORATORY EXAMINATIONS: ICD-10-CM

## 2025-07-18 DIAGNOSIS — Z01.812 PRE-PROCEDURAL LABORATORY EXAMINATIONS: Primary | ICD-10-CM

## 2025-07-18 PROCEDURE — 71046 X-RAY EXAM CHEST 2 VIEWS: CPT | Mod: 26,,, | Performed by: RADIOLOGY

## 2025-07-18 PROCEDURE — 71046 X-RAY EXAM CHEST 2 VIEWS: CPT | Mod: TC

## 2025-08-24 DIAGNOSIS — M94.9 OSTEOCHONDRAL LESION OF TALAR DOME: ICD-10-CM

## 2025-08-24 DIAGNOSIS — M89.9 OSTEOCHONDRAL LESION OF TALAR DOME: ICD-10-CM

## 2025-08-24 DIAGNOSIS — M65.971 SYNOVITIS OF RIGHT ANKLE: ICD-10-CM

## 2025-08-28 RX ORDER — MELOXICAM 7.5 MG/1
7.5 TABLET ORAL
Qty: 60 TABLET | Refills: 0 | Status: SHIPPED | OUTPATIENT
Start: 2025-08-28